# Patient Record
Sex: FEMALE | Race: WHITE | Employment: OTHER | URBAN - METROPOLITAN AREA
[De-identification: names, ages, dates, MRNs, and addresses within clinical notes are randomized per-mention and may not be internally consistent; named-entity substitution may affect disease eponyms.]

---

## 2017-01-05 ENCOUNTER — OFFICE VISIT (OUTPATIENT)
Dept: NEPHROLOGY | Facility: CLINIC | Age: 57
End: 2017-01-05

## 2017-01-05 VITALS — BODY MASS INDEX: 36 KG/M2 | DIASTOLIC BLOOD PRESSURE: 78 MMHG | WEIGHT: 190 LBS | SYSTOLIC BLOOD PRESSURE: 124 MMHG

## 2017-01-05 DIAGNOSIS — N18.3 CKD (CHRONIC KIDNEY DISEASE), STAGE 3 (MODERATE): Primary | ICD-10-CM

## 2017-01-05 DIAGNOSIS — Z94.0 RENAL TRANSPLANT RECIPIENT: ICD-10-CM

## 2017-01-05 DIAGNOSIS — I10 BENIGN HYPERTENSION: ICD-10-CM

## 2017-01-05 PROCEDURE — 99214 OFFICE O/P EST MOD 30 MIN: CPT | Performed by: INTERNAL MEDICINE

## 2017-01-05 RX ORDER — PREDNISONE 1 MG/1
5 TABLET ORAL DAILY
COMMUNITY
End: 2017-01-13

## 2017-01-05 RX ORDER — DOXEPIN HYDROCHLORIDE 50 MG/1
1 CAPSULE ORAL DAILY
COMMUNITY

## 2017-01-05 RX ORDER — AMPICILLIN TRIHYDRATE 250 MG
1000 CAPSULE ORAL 2 TIMES DAILY
COMMUNITY

## 2017-01-05 RX ORDER — MAGNESIUM OXIDE 400 MG (241.3 MG MAGNESIUM) TABLET
400 TABLET 2 TIMES DAILY
COMMUNITY

## 2017-01-05 NOTE — PROGRESS NOTES
Nephrology Progress Note      Madelin Diaz is a 60year old female.     HPI:   Patient presents with:  Chronic Kidney Disease  Hypertension  Renal Transplant    Maryann Galvez was seen in the nephrology clinic today in follow-up for management of chronic kidn after 30 min. Disp:  Rfl:    aspirin 81 MG Oral Tab Take 81 mg by mouth daily. Disp:  Rfl:    Omega 3 1000 MG Oral Cap Take 3,000 mg by mouth 2 (two) times daily. Disp:  Rfl:    Cholecalciferol (VITAMIN D) 1000 UNITS Oral Cap Take  by mouth.  Disp:  Rfl: 12/23/2016   MCHC 30.5* 12/23/2016   RDW 13.2 12/23/2016   NEPRELIM 3.74 12/23/2016   NEPERCENT 50.5 12/23/2016   LYPERCENT 36.1 12/23/2016   MOPERCENT 8.6 12/23/2016   EOPERCENT 3.8 12/23/2016   BAPERCENT 0.9 12/23/2016   NE 3.74 12/23/2016   LYMABS 2.68 losartan.       Perez Fleming MD  1/5/2017  9:10 AM

## 2017-01-13 RX ORDER — CYCLOSPORINE 25 MG/1
CAPSULE, GELATIN COATED ORAL
Qty: 450 CAPSULE | Refills: 3 | Status: SHIPPED | OUTPATIENT
Start: 2017-01-13 | End: 2018-01-25

## 2017-01-13 RX ORDER — ATORVASTATIN CALCIUM 20 MG/1
20 TABLET, FILM COATED ORAL DAILY
Qty: 90 TABLET | Refills: 3 | Status: SHIPPED | OUTPATIENT
Start: 2017-01-13 | End: 2018-01-25

## 2017-01-13 RX ORDER — LOSARTAN POTASSIUM 50 MG/1
50 TABLET ORAL DAILY
Qty: 90 TABLET | Refills: 3 | Status: SHIPPED | OUTPATIENT
Start: 2017-01-13 | End: 2018-01-25

## 2017-01-13 RX ORDER — PREDNISONE 1 MG/1
5 TABLET ORAL DAILY
Qty: 90 TABLET | Refills: 3 | Status: SHIPPED | OUTPATIENT
Start: 2017-01-13 | End: 2018-01-25

## 2017-01-13 RX ORDER — MYCOPHENOLATE MOFETIL 500 MG/1
1000 TABLET ORAL 2 TIMES DAILY
Qty: 360 TABLET | Refills: 3 | Status: SHIPPED | OUTPATIENT
Start: 2017-01-13 | End: 2018-01-25

## 2017-03-31 ENCOUNTER — LAB ENCOUNTER (OUTPATIENT)
Dept: LAB | Age: 57
End: 2017-03-31
Attending: INTERNAL MEDICINE
Payer: COMMERCIAL

## 2017-03-31 DIAGNOSIS — N18.3 CKD (CHRONIC KIDNEY DISEASE), STAGE 3 (MODERATE): ICD-10-CM

## 2017-03-31 DIAGNOSIS — I10 BENIGN HYPERTENSION: ICD-10-CM

## 2017-03-31 DIAGNOSIS — Z94.0 RENAL TRANSPLANT RECIPIENT: ICD-10-CM

## 2017-03-31 PROCEDURE — 80061 LIPID PANEL: CPT

## 2017-03-31 PROCEDURE — 80053 COMPREHEN METABOLIC PANEL: CPT

## 2017-03-31 PROCEDURE — 80158 DRUG ASSAY CYCLOSPORINE: CPT

## 2017-03-31 PROCEDURE — 36415 COLL VENOUS BLD VENIPUNCTURE: CPT

## 2017-03-31 PROCEDURE — 81001 URINALYSIS AUTO W/SCOPE: CPT

## 2017-03-31 PROCEDURE — 83036 HEMOGLOBIN GLYCOSYLATED A1C: CPT

## 2017-03-31 PROCEDURE — 85025 COMPLETE CBC W/AUTO DIFF WBC: CPT

## 2017-03-31 PROCEDURE — 82570 ASSAY OF URINE CREATININE: CPT

## 2017-03-31 PROCEDURE — 84156 ASSAY OF PROTEIN URINE: CPT

## 2017-04-06 PROBLEM — E78.2 MIXED HYPERLIPIDEMIA: Status: ACTIVE | Noted: 2017-04-06

## 2017-04-06 PROBLEM — E11.22 DIABETES MELLITUS WITH STAGE 3 CHRONIC KIDNEY DISEASE (HCC): Status: ACTIVE | Noted: 2017-04-06

## 2017-04-06 PROBLEM — I10 ESSENTIAL HYPERTENSION: Status: ACTIVE | Noted: 2017-04-06

## 2017-04-06 PROBLEM — N18.30 DIABETES MELLITUS WITH STAGE 3 CHRONIC KIDNEY DISEASE (HCC): Status: ACTIVE | Noted: 2017-04-06

## 2017-04-19 ENCOUNTER — TELEPHONE (OUTPATIENT)
Dept: FAMILY MEDICINE CLINIC | Facility: CLINIC | Age: 57
End: 2017-04-19

## 2017-04-19 DIAGNOSIS — Z00.00 ROUTINE HEALTH MAINTENANCE: Primary | ICD-10-CM

## 2017-04-20 ENCOUNTER — TELEPHONE (OUTPATIENT)
Dept: FAMILY MEDICINE CLINIC | Facility: CLINIC | Age: 57
End: 2017-04-20

## 2017-04-20 NOTE — TELEPHONE ENCOUNTER
Previous colonoscopy report and path report printed and faxed to Dr. Osei Degroot.
03-Feb-2017 06:00

## 2017-05-09 ENCOUNTER — HOSPITAL ENCOUNTER (OUTPATIENT)
Dept: BONE DENSITY | Age: 57
Discharge: HOME | End: 2017-05-09
Attending: ADVANCED PRACTICE MIDWIFE
Payer: COMMERCIAL

## 2017-05-09 ENCOUNTER — HOSPITAL ENCOUNTER (OUTPATIENT)
Dept: MAMMOGRAPHY | Age: 57
Discharge: HOME | End: 2017-05-09
Attending: ADVANCED PRACTICE MIDWIFE
Payer: COMMERCIAL

## 2017-05-09 DIAGNOSIS — M85.80 OSTEOPENIA: ICD-10-CM

## 2017-05-09 DIAGNOSIS — Z12.31 SCREENING MAMMOGRAM, ENCOUNTER FOR: ICD-10-CM

## 2017-05-09 PROCEDURE — 77080 DXA BONE DENSITY AXIAL: CPT | Performed by: ADVANCED PRACTICE MIDWIFE

## 2017-05-09 PROCEDURE — 77067 SCR MAMMO BI INCL CAD: CPT | Performed by: ADVANCED PRACTICE MIDWIFE

## 2017-06-30 ENCOUNTER — LAB ENCOUNTER (OUTPATIENT)
Dept: LAB | Age: 57
End: 2017-06-30
Attending: INTERNAL MEDICINE
Payer: COMMERCIAL

## 2017-06-30 DIAGNOSIS — I10 BENIGN HYPERTENSION: ICD-10-CM

## 2017-06-30 DIAGNOSIS — N18.30 STAGE 3 CHRONIC KIDNEY DISEASE (HCC): ICD-10-CM

## 2017-06-30 DIAGNOSIS — Z94.0 RENAL TRANSPLANT RECIPIENT: ICD-10-CM

## 2017-06-30 LAB
ALBUMIN SERPL-MCNC: 3.4 G/DL (ref 3.5–4.8)
ALP LIVER SERPL-CCNC: 86 U/L (ref 46–118)
ALT SERPL-CCNC: 16 U/L (ref 14–54)
AST SERPL-CCNC: 13 U/L (ref 15–41)
BASOPHILS # BLD AUTO: 0.07 X10(3) UL (ref 0–0.1)
BASOPHILS NFR BLD AUTO: 1.2 %
BILIRUB SERPL-MCNC: 0.6 MG/DL (ref 0.1–2)
BILIRUB UR QL STRIP.AUTO: NEGATIVE
BUN BLD-MCNC: 36 MG/DL (ref 8–20)
CALCIUM BLD-MCNC: 9.4 MG/DL (ref 8.3–10.3)
CHLORIDE: 109 MMOL/L (ref 101–111)
CHOLEST SMN-MCNC: 150 MG/DL (ref ?–200)
CO2: 27 MMOL/L (ref 22–32)
COLOR UR AUTO: YELLOW
CREAT BLD-MCNC: 1.95 MG/DL (ref 0.55–1.02)
CREAT UR-SCNC: 242 MG/DL
EOSINOPHIL # BLD AUTO: 0.2 X10(3) UL (ref 0–0.3)
EOSINOPHIL NFR BLD AUTO: 3.4 %
ERYTHROCYTE [DISTWIDTH] IN BLOOD BY AUTOMATED COUNT: 13.5 % (ref 11.5–16)
EST. AVERAGE GLUCOSE BLD GHB EST-MCNC: 131 MG/DL (ref 68–126)
GLUCOSE BLD-MCNC: 79 MG/DL (ref 70–99)
GLUCOSE UR STRIP.AUTO-MCNC: NEGATIVE MG/DL
HBA1C MFR BLD HPLC: 6.2 % (ref ?–5.7)
HCT VFR BLD AUTO: 35.4 % (ref 34–50)
HDLC SERPL-MCNC: 57 MG/DL (ref 45–?)
HDLC SERPL: 2.63 {RATIO} (ref ?–4.44)
HGB BLD-MCNC: 10.9 G/DL (ref 12–16)
HYALINE CASTS #/AREA URNS AUTO: PRESENT /LPF
IMMATURE GRANULOCYTE COUNT: 0.02 X10(3) UL (ref 0–1)
IMMATURE GRANULOCYTE RATIO %: 0.3 %
KETONES UR STRIP.AUTO-MCNC: NEGATIVE MG/DL
LDLC SERPL CALC-MCNC: 68 MG/DL (ref ?–130)
LYMPHOCYTES # BLD AUTO: 2.33 X10(3) UL (ref 0.9–4)
LYMPHOCYTES NFR BLD AUTO: 39.5 %
M PROTEIN MFR SERPL ELPH: 6.9 G/DL (ref 6.1–8.3)
MCH RBC QN AUTO: 29.1 PG (ref 27–33.2)
MCHC RBC AUTO-ENTMCNC: 30.8 G/DL (ref 31–37)
MCV RBC AUTO: 94.7 FL (ref 81–100)
MONOCYTES # BLD AUTO: 0.6 X10(3) UL (ref 0.1–0.6)
MONOCYTES NFR BLD AUTO: 10.2 %
NEUTROPHIL ABS PRELIM: 2.68 X10 (3) UL (ref 1.3–6.7)
NEUTROPHILS # BLD AUTO: 2.68 X10(3) UL (ref 1.3–6.7)
NEUTROPHILS NFR BLD AUTO: 45.4 %
NITRITE UR QL STRIP.AUTO: NEGATIVE
NONHDLC SERPL-MCNC: 93 MG/DL (ref ?–130)
PH UR STRIP.AUTO: 5 [PH] (ref 4.5–8)
PLATELET # BLD AUTO: 216 10(3)UL (ref 150–450)
POTASSIUM SERPL-SCNC: 4.5 MMOL/L (ref 3.6–5.1)
PROT UR STRIP.AUTO-MCNC: NEGATIVE MG/DL
PROT UR-MCNC: 14 MG/DL
RBC # BLD AUTO: 3.74 X10(6)UL (ref 3.8–5.1)
RBC UR QL AUTO: NEGATIVE
RED CELL DISTRIBUTION WIDTH-SD: 45.9 FL (ref 35.1–46.3)
SODIUM SERPL-SCNC: 144 MMOL/L (ref 136–144)
SP GR UR STRIP.AUTO: 1.02 (ref 1–1.03)
TRIGLYCERIDES: 124 MG/DL (ref ?–150)
UROBILINOGEN UR STRIP.AUTO-MCNC: <2 MG/DL
VLDL: 25 MG/DL (ref 5–40)
WBC # BLD AUTO: 5.9 X10(3) UL (ref 4–13)

## 2017-06-30 PROCEDURE — 80158 DRUG ASSAY CYCLOSPORINE: CPT | Performed by: INTERNAL MEDICINE

## 2017-06-30 PROCEDURE — 85025 COMPLETE CBC W/AUTO DIFF WBC: CPT | Performed by: INTERNAL MEDICINE

## 2017-06-30 PROCEDURE — 82570 ASSAY OF URINE CREATININE: CPT | Performed by: INTERNAL MEDICINE

## 2017-06-30 PROCEDURE — 80061 LIPID PANEL: CPT | Performed by: INTERNAL MEDICINE

## 2017-06-30 PROCEDURE — 84156 ASSAY OF PROTEIN URINE: CPT | Performed by: INTERNAL MEDICINE

## 2017-06-30 PROCEDURE — 80053 COMPREHEN METABOLIC PANEL: CPT | Performed by: INTERNAL MEDICINE

## 2017-06-30 PROCEDURE — 81001 URINALYSIS AUTO W/SCOPE: CPT | Performed by: INTERNAL MEDICINE

## 2017-06-30 PROCEDURE — 83036 HEMOGLOBIN GLYCOSYLATED A1C: CPT | Performed by: INTERNAL MEDICINE

## 2017-06-30 PROCEDURE — 36415 COLL VENOUS BLD VENIPUNCTURE: CPT | Performed by: INTERNAL MEDICINE

## 2017-07-01 LAB — CYCLOSPORINE A: 73.5 NG/ML

## 2017-09-29 ENCOUNTER — LAB ENCOUNTER (OUTPATIENT)
Dept: LAB | Age: 57
End: 2017-09-29
Attending: INTERNAL MEDICINE
Payer: COMMERCIAL

## 2017-09-29 DIAGNOSIS — I10 BENIGN HYPERTENSION: ICD-10-CM

## 2017-09-29 DIAGNOSIS — N18.30 STAGE 3 CHRONIC KIDNEY DISEASE (HCC): ICD-10-CM

## 2017-09-29 DIAGNOSIS — Z94.0 RENAL TRANSPLANT RECIPIENT: ICD-10-CM

## 2017-09-29 PROCEDURE — 83036 HEMOGLOBIN GLYCOSYLATED A1C: CPT | Performed by: INTERNAL MEDICINE

## 2017-09-29 PROCEDURE — 82570 ASSAY OF URINE CREATININE: CPT | Performed by: INTERNAL MEDICINE

## 2017-09-29 PROCEDURE — 85025 COMPLETE CBC W/AUTO DIFF WBC: CPT | Performed by: INTERNAL MEDICINE

## 2017-09-29 PROCEDURE — 84156 ASSAY OF PROTEIN URINE: CPT | Performed by: INTERNAL MEDICINE

## 2017-09-29 PROCEDURE — 80158 DRUG ASSAY CYCLOSPORINE: CPT | Performed by: INTERNAL MEDICINE

## 2017-09-29 PROCEDURE — 81001 URINALYSIS AUTO W/SCOPE: CPT | Performed by: INTERNAL MEDICINE

## 2017-09-29 PROCEDURE — 80053 COMPREHEN METABOLIC PANEL: CPT | Performed by: INTERNAL MEDICINE

## 2017-09-29 PROCEDURE — 36415 COLL VENOUS BLD VENIPUNCTURE: CPT | Performed by: INTERNAL MEDICINE

## 2017-09-29 PROCEDURE — 80061 LIPID PANEL: CPT | Performed by: INTERNAL MEDICINE

## 2017-10-26 PROBLEM — N18.30 DIABETES MELLITUS WITH STAGE 3 CHRONIC KIDNEY DISEASE (HCC): Status: RESOLVED | Noted: 2017-04-06 | Resolved: 2017-10-26

## 2017-10-26 PROBLEM — E11.22 DIABETES MELLITUS WITH STAGE 3 CHRONIC KIDNEY DISEASE (HCC): Status: RESOLVED | Noted: 2017-04-06 | Resolved: 2017-10-26

## 2017-11-27 DIAGNOSIS — Z94.0 S/P KIDNEY TRANSPLANT: Primary | ICD-10-CM

## 2017-12-29 ENCOUNTER — APPOINTMENT (OUTPATIENT)
Dept: LAB | Age: 57
End: 2017-12-29
Attending: INTERNAL MEDICINE
Payer: COMMERCIAL

## 2017-12-29 DIAGNOSIS — N18.30 STAGE 3 CHRONIC KIDNEY DISEASE (HCC): ICD-10-CM

## 2017-12-29 DIAGNOSIS — I10 BENIGN HYPERTENSION: ICD-10-CM

## 2017-12-29 DIAGNOSIS — Z94.0 RENAL TRANSPLANT RECIPIENT: ICD-10-CM

## 2017-12-29 LAB
CHOLEST SMN-MCNC: 163 MG/DL (ref ?–200)
EST. AVERAGE GLUCOSE BLD GHB EST-MCNC: 137 MG/DL (ref 68–126)
HBA1C MFR BLD HPLC: 6.4 % (ref ?–5.7)
HDLC SERPL-MCNC: 61 MG/DL (ref 45–?)
HDLC SERPL: 2.67 {RATIO} (ref ?–4.44)
LDLC SERPL CALC-MCNC: 77 MG/DL (ref ?–130)
NONHDLC SERPL-MCNC: 102 MG/DL (ref ?–130)
TRIGL SERPL-MCNC: 126 MG/DL (ref ?–150)
VLDLC SERPL CALC-MCNC: 25 MG/DL (ref 5–40)

## 2017-12-29 PROCEDURE — 83036 HEMOGLOBIN GLYCOSYLATED A1C: CPT | Performed by: INTERNAL MEDICINE

## 2017-12-29 PROCEDURE — 80061 LIPID PANEL: CPT | Performed by: INTERNAL MEDICINE

## 2018-01-09 ENCOUNTER — OFFICE VISIT (OUTPATIENT)
Dept: NEPHROLOGY | Facility: CLINIC | Age: 58
End: 2018-01-09

## 2018-01-09 VITALS — WEIGHT: 195 LBS | BODY MASS INDEX: 37 KG/M2 | SYSTOLIC BLOOD PRESSURE: 114 MMHG | DIASTOLIC BLOOD PRESSURE: 72 MMHG

## 2018-01-09 DIAGNOSIS — N18.30 CKD (CHRONIC KIDNEY DISEASE) STAGE 3, GFR 30-59 ML/MIN (HCC): Primary | ICD-10-CM

## 2018-01-09 DIAGNOSIS — Z94.0 RENAL TRANSPLANT RECIPIENT: ICD-10-CM

## 2018-01-09 DIAGNOSIS — I10 BENIGN HYPERTENSION: ICD-10-CM

## 2018-01-09 PROCEDURE — 99214 OFFICE O/P EST MOD 30 MIN: CPT | Performed by: INTERNAL MEDICINE

## 2018-01-09 NOTE — PROGRESS NOTES
Nephrology Progress Note      Lee Anderson is a 59year old female.     HPI:   Patient presents with:  Chronic Kidney Disease  Hypertension  Renal Transplant      Francesca oMrley was seen in the nephrology clinic today in follow-up for management of chronic ki times daily. Disp:  Rfl:    multivitamin Oral Tab Take 1 tablet by mouth daily. Disp:  Rfl:    Cinnamon 500 MG Oral Cap Take 1,000 mg by mouth 2 (two) times daily. Disp:  Rfl:    Misc Natural Products (OSTEO BI-FLEX ADV DOUBLE ST OR) Take by mouth daily.  D 12/29/2017   ALT 19 12/29/2017   BILT 0.6 12/29/2017   TP 7.2 12/29/2017   ALB 3.5 12/29/2017    12/29/2017   K 4.4 12/29/2017    12/29/2017   CO2 26.0 12/29/2017       Lab Results  Component Value Date   RBC 4.05 12/29/2017   HGB 12.0 12/29/20 Again, she has done very well from a renal function standpoint and remains stable on Sandimmune 75 mg in the morning/50 mg in the evening and CellCept 1000 mg twice daily as well as prednisone 5 mg daily.   We will make no changes in her immunosuppression r

## 2018-01-25 RX ORDER — ATORVASTATIN CALCIUM 20 MG/1
20 TABLET, FILM COATED ORAL DAILY
Qty: 90 TABLET | Refills: 1 | Status: SHIPPED | OUTPATIENT
Start: 2018-01-25 | End: 2018-06-06

## 2018-01-25 RX ORDER — CYCLOSPORINE 25 MG/1
CAPSULE, GELATIN COATED ORAL
Qty: 450 CAPSULE | Refills: 1 | Status: SHIPPED | OUTPATIENT
Start: 2018-01-25 | End: 2018-06-06

## 2018-01-25 RX ORDER — LOSARTAN POTASSIUM 50 MG/1
50 TABLET ORAL DAILY
Qty: 90 TABLET | Refills: 1 | Status: SHIPPED | OUTPATIENT
Start: 2018-01-25 | End: 2018-06-06

## 2018-01-25 RX ORDER — MYCOPHENOLATE MOFETIL 500 MG/1
1000 TABLET ORAL 2 TIMES DAILY
Qty: 360 TABLET | Refills: 1 | Status: SHIPPED | OUTPATIENT
Start: 2018-01-25 | End: 2018-06-06

## 2018-01-25 RX ORDER — PREDNISONE 1 MG/1
5 TABLET ORAL DAILY
Qty: 90 TABLET | Refills: 1 | Status: SHIPPED | OUTPATIENT
Start: 2018-01-25 | End: 2018-06-06

## 2018-02-21 ENCOUNTER — PATIENT OUTREACH (OUTPATIENT)
Dept: FAMILY MEDICINE CLINIC | Facility: CLINIC | Age: 58
End: 2018-02-21

## 2018-04-09 DIAGNOSIS — Z94.0 STATUS POST KIDNEY TRANSPLANT: Primary | ICD-10-CM

## 2018-04-10 ENCOUNTER — TELEPHONE (OUTPATIENT)
Dept: FAMILY MEDICINE CLINIC | Facility: CLINIC | Age: 58
End: 2018-04-10

## 2018-04-10 PROBLEM — M17.0 PRIMARY OSTEOARTHRITIS OF BOTH KNEES: Status: ACTIVE | Noted: 2018-04-10

## 2018-04-10 NOTE — TELEPHONE ENCOUNTER
Patient states that she has already had her diabetic eye exam. Had it done at Dr Fran Nascimento, ph # [phone]. Please call there to get a copy.

## 2018-04-13 ENCOUNTER — LAB ENCOUNTER (OUTPATIENT)
Dept: LAB | Age: 58
End: 2018-04-13
Attending: INTERNAL MEDICINE
Payer: COMMERCIAL

## 2018-04-13 DIAGNOSIS — Z94.0 STATUS POST KIDNEY TRANSPLANT: ICD-10-CM

## 2018-04-13 PROCEDURE — 36415 COLL VENOUS BLD VENIPUNCTURE: CPT | Performed by: INTERNAL MEDICINE

## 2018-04-13 PROCEDURE — 81001 URINALYSIS AUTO W/SCOPE: CPT | Performed by: INTERNAL MEDICINE

## 2018-04-13 PROCEDURE — 82570 ASSAY OF URINE CREATININE: CPT | Performed by: INTERNAL MEDICINE

## 2018-04-13 PROCEDURE — 80053 COMPREHEN METABOLIC PANEL: CPT | Performed by: INTERNAL MEDICINE

## 2018-04-13 PROCEDURE — 85025 COMPLETE CBC W/AUTO DIFF WBC: CPT | Performed by: INTERNAL MEDICINE

## 2018-04-13 PROCEDURE — 84156 ASSAY OF PROTEIN URINE: CPT | Performed by: INTERNAL MEDICINE

## 2018-04-13 PROCEDURE — 80158 DRUG ASSAY CYCLOSPORINE: CPT | Performed by: INTERNAL MEDICINE

## 2018-04-16 DIAGNOSIS — Z94.0 STATUS POST KIDNEY TRANSPLANT: Primary | ICD-10-CM

## 2018-04-27 ENCOUNTER — TELEPHONE (OUTPATIENT)
Dept: FAMILY MEDICINE CLINIC | Facility: CLINIC | Age: 58
End: 2018-04-27

## 2018-04-27 NOTE — TELEPHONE ENCOUNTER
Fax received of labs from [facility]. External results put in [facility]. Fax sent for scanning. Left message on home phone number to call back.  Per Dr. Desmond Rowell, labs from above office look great and pt needs diabetic eye exam. There is a m

## 2018-05-21 ENCOUNTER — HOSPITAL ENCOUNTER (OUTPATIENT)
Dept: MAMMOGRAPHY | Facility: HOSPITAL | Age: 58
Discharge: HOME | End: 2018-05-21
Attending: ADVANCED PRACTICE MIDWIFE
Payer: COMMERCIAL

## 2018-05-21 DIAGNOSIS — Z12.39 ENCOUNTER FOR SCREENING FOR MALIGNANT NEOPLASM OF BREAST: ICD-10-CM

## 2018-05-21 PROCEDURE — 77063 BREAST TOMOSYNTHESIS BI: CPT | Performed by: ADVANCED PRACTICE MIDWIFE

## 2018-05-21 PROCEDURE — 77067 SCR MAMMO BI INCL CAD: CPT | Performed by: ADVANCED PRACTICE MIDWIFE

## 2018-06-06 RX ORDER — CYCLOSPORINE 25 MG/1
CAPSULE, GELATIN COATED ORAL
Qty: 450 CAPSULE | Refills: 1 | Status: CANCELLED
Start: 2018-06-06

## 2018-06-06 RX ORDER — ATORVASTATIN CALCIUM 20 MG/1
20 TABLET, FILM COATED ORAL DAILY
Qty: 90 TABLET | Refills: 1 | Status: CANCELLED
Start: 2018-06-06

## 2018-06-06 RX ORDER — ATORVASTATIN CALCIUM 20 MG/1
20 TABLET, FILM COATED ORAL DAILY
Qty: 90 TABLET | Refills: 1 | Status: SHIPPED | OUTPATIENT
Start: 2018-06-06 | End: 2020-04-06

## 2018-06-06 RX ORDER — PREDNISONE 5 MG/1
5 TABLET ORAL DAILY
Qty: 90 TABLET | Refills: 1 | Status: CANCELLED
Start: 2018-06-06

## 2018-06-06 RX ORDER — PREDNISONE 1 MG/1
5 TABLET ORAL DAILY
Qty: 90 TABLET | Refills: 1 | Status: SHIPPED | OUTPATIENT
Start: 2018-06-06 | End: 2020-04-06

## 2018-06-06 RX ORDER — LOSARTAN POTASSIUM 50 MG/1
50 TABLET ORAL DAILY
Qty: 90 TABLET | Refills: 1 | Status: CANCELLED
Start: 2018-06-06

## 2018-06-06 RX ORDER — MYCOPHENOLATE MOFETIL 500 MG/1
1000 TABLET ORAL 2 TIMES DAILY
Qty: 360 TABLET | Refills: 1 | Status: SHIPPED | OUTPATIENT
Start: 2018-06-06 | End: 2021-04-09

## 2018-06-06 RX ORDER — CYCLOSPORINE 25 MG/1
CAPSULE, GELATIN COATED ORAL
Qty: 450 CAPSULE | Refills: 1 | Status: SHIPPED | OUTPATIENT
Start: 2018-06-06 | End: 2019-09-09

## 2018-06-06 RX ORDER — MYCOPHENOLATE MOFETIL 500 MG/1
1000 TABLET ORAL 2 TIMES DAILY
Qty: 360 TABLET | Refills: 1 | Status: CANCELLED
Start: 2018-06-06

## 2018-06-06 RX ORDER — LOSARTAN POTASSIUM 50 MG/1
50 TABLET ORAL DAILY
Qty: 90 TABLET | Refills: 1 | Status: SHIPPED | OUTPATIENT
Start: 2018-06-06 | End: 2020-04-06

## 2018-06-06 NOTE — TELEPHONE ENCOUNTER
From: Peyton Nova  Sent: 6/6/2018 9:19 AM CDT  Subject: Medication Renewal Request    Peyton Nova would like a refill of the following medications:     predniSONE 5 MG Oral Tab Nathalie Espinoza MD]     Losartan Potassium 50 MG Oral Tab Fred Can MD]     Mycophenolate Mofetil 500 MG Oral Tab Fred Can MD]     cycloSPORINE non-modified 25 MG Oral Cap Fred Can MD]     atorvastatin (LIPITOR) 20 MG Oral Tab Fred Can MD]    Preferred pharmacy: [facility] [address] [phone], [phone]

## 2018-07-06 ENCOUNTER — LAB ENCOUNTER (OUTPATIENT)
Dept: LAB | Age: 58
End: 2018-07-06
Attending: INTERNAL MEDICINE
Payer: COMMERCIAL

## 2018-07-06 DIAGNOSIS — Z94.0 STATUS POST KIDNEY TRANSPLANT: ICD-10-CM

## 2018-07-06 LAB
ALBUMIN SERPL-MCNC: 3.5 G/DL (ref 3.5–4.8)
ALP LIVER SERPL-CCNC: 69 U/L (ref 46–118)
ALT SERPL-CCNC: 18 U/L (ref 14–54)
AST SERPL-CCNC: 19 U/L (ref 15–41)
BASOPHILS # BLD AUTO: 0.08 X10(3) UL (ref 0–0.1)
BASOPHILS NFR BLD AUTO: 1 %
BILIRUB SERPL-MCNC: 0.6 MG/DL (ref 0.1–2)
BILIRUB UR QL STRIP.AUTO: NEGATIVE
BUN BLD-MCNC: 23 MG/DL (ref 8–20)
CALCIUM BLD-MCNC: 9.7 MG/DL (ref 8.3–10.3)
CHLORIDE: 109 MMOL/L (ref 101–111)
CO2: 25 MMOL/L (ref 22–32)
COLOR UR AUTO: YELLOW
CREAT BLD-MCNC: 1.53 MG/DL (ref 0.55–1.02)
CREAT UR-SCNC: 250 MG/DL
EOSINOPHIL # BLD AUTO: 0.13 X10(3) UL (ref 0–0.3)
EOSINOPHIL NFR BLD AUTO: 1.7 %
ERYTHROCYTE [DISTWIDTH] IN BLOOD BY AUTOMATED COUNT: 12.7 % (ref 11.5–16)
GLUCOSE BLD-MCNC: 80 MG/DL (ref 70–99)
GLUCOSE UR STRIP.AUTO-MCNC: NEGATIVE MG/DL
HCT VFR BLD AUTO: 36.4 % (ref 34–50)
HGB BLD-MCNC: 11.4 G/DL (ref 12–16)
HYALINE CASTS #/AREA URNS AUTO: PRESENT /LPF
IMMATURE GRANULOCYTE COUNT: 0.01 X10(3) UL (ref 0–1)
IMMATURE GRANULOCYTE RATIO %: 0.1 %
KETONES UR STRIP.AUTO-MCNC: NEGATIVE MG/DL
LYMPHOCYTES # BLD AUTO: 3.2 X10(3) UL (ref 0.9–4)
LYMPHOCYTES NFR BLD AUTO: 41.8 %
M PROTEIN MFR SERPL ELPH: 6.9 G/DL (ref 6.1–8.3)
MCH RBC QN AUTO: 29.7 PG (ref 27–33.2)
MCHC RBC AUTO-ENTMCNC: 31.3 G/DL (ref 31–37)
MCV RBC AUTO: 94.8 FL (ref 81–100)
MONOCYTES # BLD AUTO: 0.62 X10(3) UL (ref 0.1–1)
MONOCYTES NFR BLD AUTO: 8.1 %
NEUTROPHIL ABS PRELIM: 3.61 X10 (3) UL (ref 1.3–6.7)
NEUTROPHILS # BLD AUTO: 3.61 X10(3) UL (ref 1.3–6.7)
NEUTROPHILS NFR BLD AUTO: 47.3 %
NITRITE UR QL STRIP.AUTO: NEGATIVE
PH UR STRIP.AUTO: 5 [PH] (ref 4.5–8)
PLATELET # BLD AUTO: 242 10(3)UL (ref 150–450)
POTASSIUM SERPL-SCNC: 4.4 MMOL/L (ref 3.6–5.1)
PROT UR STRIP.AUTO-MCNC: NEGATIVE MG/DL
PROT UR-MCNC: 17.1 MG/DL
RBC # BLD AUTO: 3.84 X10(6)UL (ref 3.8–5.1)
RED CELL DISTRIBUTION WIDTH-SD: 43.5 FL (ref 35.1–46.3)
SODIUM SERPL-SCNC: 143 MMOL/L (ref 136–144)
SP GR UR STRIP.AUTO: 1.02 (ref 1–1.03)
UROBILINOGEN UR STRIP.AUTO-MCNC: <2 MG/DL
WBC # BLD AUTO: 7.7 X10(3) UL (ref 4–13)

## 2018-07-06 PROCEDURE — 84156 ASSAY OF PROTEIN URINE: CPT | Performed by: INTERNAL MEDICINE

## 2018-07-06 PROCEDURE — 82570 ASSAY OF URINE CREATININE: CPT | Performed by: INTERNAL MEDICINE

## 2018-07-06 PROCEDURE — 80053 COMPREHEN METABOLIC PANEL: CPT | Performed by: INTERNAL MEDICINE

## 2018-07-06 PROCEDURE — 85025 COMPLETE CBC W/AUTO DIFF WBC: CPT | Performed by: INTERNAL MEDICINE

## 2018-07-06 PROCEDURE — 81001 URINALYSIS AUTO W/SCOPE: CPT | Performed by: INTERNAL MEDICINE

## 2018-07-06 PROCEDURE — 36415 COLL VENOUS BLD VENIPUNCTURE: CPT | Performed by: INTERNAL MEDICINE

## 2018-07-06 PROCEDURE — 80158 DRUG ASSAY CYCLOSPORINE: CPT | Performed by: INTERNAL MEDICINE

## 2018-07-09 LAB — CYCLOSPORINE A: 95.2 NG/ML

## 2018-09-28 ENCOUNTER — LAB ENCOUNTER (OUTPATIENT)
Dept: LAB | Age: 58
End: 2018-09-28
Attending: INTERNAL MEDICINE
Payer: COMMERCIAL

## 2018-09-28 DIAGNOSIS — Z94.0 STATUS POST KIDNEY TRANSPLANT: ICD-10-CM

## 2018-09-28 LAB
ALBUMIN SERPL-MCNC: 3.5 G/DL (ref 3.5–4.8)
ALBUMIN/GLOB SERPL: 1 {RATIO} (ref 1–2)
ALP LIVER SERPL-CCNC: 80 U/L (ref 46–118)
ALT SERPL-CCNC: 17 U/L (ref 14–54)
ANION GAP SERPL CALC-SCNC: 7 MMOL/L (ref 0–18)
AST SERPL-CCNC: 15 U/L (ref 15–41)
BASOPHILS # BLD AUTO: 0.06 X10(3) UL (ref 0–0.1)
BASOPHILS NFR BLD AUTO: 0.9 %
BILIRUB SERPL-MCNC: 0.7 MG/DL (ref 0.1–2)
BILIRUB UR QL STRIP.AUTO: NEGATIVE
BUN BLD-MCNC: 25 MG/DL (ref 8–20)
BUN/CREAT SERPL: 16.6 (ref 10–20)
CALCIUM BLD-MCNC: 9.7 MG/DL (ref 8.3–10.3)
CHLORIDE SERPL-SCNC: 110 MMOL/L (ref 101–111)
CLARITY UR REFRACT.AUTO: CLEAR
CO2 SERPL-SCNC: 26 MMOL/L (ref 22–32)
COLOR UR AUTO: YELLOW
CREAT BLD-MCNC: 1.51 MG/DL (ref 0.55–1.02)
CREAT UR-SCNC: 62 MG/DL
EOSINOPHIL # BLD AUTO: 0.15 X10(3) UL (ref 0–0.3)
EOSINOPHIL NFR BLD AUTO: 2.2 %
ERYTHROCYTE [DISTWIDTH] IN BLOOD BY AUTOMATED COUNT: 12.8 % (ref 11.5–16)
GLOBULIN PLAS-MCNC: 3.5 G/DL (ref 2.5–4)
GLUCOSE BLD-MCNC: 85 MG/DL (ref 70–99)
GLUCOSE UR STRIP.AUTO-MCNC: NEGATIVE MG/DL
HCT VFR BLD AUTO: 37.8 % (ref 34–50)
HGB BLD-MCNC: 11.4 G/DL (ref 12–16)
IMMATURE GRANULOCYTE COUNT: 0.01 X10(3) UL (ref 0–1)
IMMATURE GRANULOCYTE RATIO %: 0.1 %
KETONES UR STRIP.AUTO-MCNC: NEGATIVE MG/DL
LYMPHOCYTES # BLD AUTO: 2.66 X10(3) UL (ref 0.9–4)
LYMPHOCYTES NFR BLD AUTO: 38.6 %
M PROTEIN MFR SERPL ELPH: 7 G/DL (ref 6.1–8.3)
MCH RBC QN AUTO: 29.2 PG (ref 27–33.2)
MCHC RBC AUTO-ENTMCNC: 30.2 G/DL (ref 31–37)
MCV RBC AUTO: 96.7 FL (ref 81–100)
MONOCYTES # BLD AUTO: 0.57 X10(3) UL (ref 0.1–1)
MONOCYTES NFR BLD AUTO: 8.3 %
NEUTROPHIL ABS PRELIM: 3.45 X10 (3) UL (ref 1.3–6.7)
NEUTROPHILS # BLD AUTO: 3.45 X10(3) UL (ref 1.3–6.7)
NEUTROPHILS NFR BLD AUTO: 49.9 %
NITRITE UR QL STRIP.AUTO: NEGATIVE
OSMOLALITY SERPL CALC.SUM OF ELEC: 300 MOSM/KG (ref 275–295)
PH UR STRIP.AUTO: 7 [PH] (ref 4.5–8)
PLATELET # BLD AUTO: 268 10(3)UL (ref 150–450)
POTASSIUM SERPL-SCNC: 4.6 MMOL/L (ref 3.6–5.1)
PROT UR STRIP.AUTO-MCNC: NEGATIVE MG/DL
PROT UR-MCNC: 5.8 MG/DL
RBC # BLD AUTO: 3.91 X10(6)UL (ref 3.8–5.1)
RBC UR QL AUTO: NEGATIVE
RED CELL DISTRIBUTION WIDTH-SD: 44.6 FL (ref 35.1–46.3)
SODIUM SERPL-SCNC: 143 MMOL/L (ref 136–144)
SP GR UR STRIP.AUTO: 1.01 (ref 1–1.03)
UROBILINOGEN UR STRIP.AUTO-MCNC: <2 MG/DL
WBC # BLD AUTO: 6.9 X10(3) UL (ref 4–13)

## 2018-09-28 PROCEDURE — 82570 ASSAY OF URINE CREATININE: CPT | Performed by: INTERNAL MEDICINE

## 2018-09-28 PROCEDURE — 80053 COMPREHEN METABOLIC PANEL: CPT | Performed by: INTERNAL MEDICINE

## 2018-09-28 PROCEDURE — 36415 COLL VENOUS BLD VENIPUNCTURE: CPT | Performed by: INTERNAL MEDICINE

## 2018-09-28 PROCEDURE — 80158 DRUG ASSAY CYCLOSPORINE: CPT | Performed by: INTERNAL MEDICINE

## 2018-09-28 PROCEDURE — 85025 COMPLETE CBC W/AUTO DIFF WBC: CPT | Performed by: INTERNAL MEDICINE

## 2018-09-28 PROCEDURE — 81001 URINALYSIS AUTO W/SCOPE: CPT | Performed by: INTERNAL MEDICINE

## 2018-09-28 PROCEDURE — 84156 ASSAY OF PROTEIN URINE: CPT | Performed by: INTERNAL MEDICINE

## 2018-09-30 LAB — CYCLOSPORINE A: 88.4 NG/ML

## 2018-11-20 DIAGNOSIS — Z94.0 STATUS POST KIDNEY TRANSPLANT: Primary | ICD-10-CM

## 2018-12-28 ENCOUNTER — LAB ENCOUNTER (OUTPATIENT)
Dept: LAB | Age: 58
End: 2018-12-28
Attending: INTERNAL MEDICINE
Payer: COMMERCIAL

## 2018-12-28 DIAGNOSIS — Z94.0 STATUS POST KIDNEY TRANSPLANT: ICD-10-CM

## 2018-12-28 PROCEDURE — 84156 ASSAY OF PROTEIN URINE: CPT | Performed by: INTERNAL MEDICINE

## 2018-12-28 PROCEDURE — 85025 COMPLETE CBC W/AUTO DIFF WBC: CPT | Performed by: INTERNAL MEDICINE

## 2018-12-28 PROCEDURE — 81001 URINALYSIS AUTO W/SCOPE: CPT | Performed by: INTERNAL MEDICINE

## 2018-12-28 PROCEDURE — 82570 ASSAY OF URINE CREATININE: CPT | Performed by: INTERNAL MEDICINE

## 2018-12-28 PROCEDURE — 80053 COMPREHEN METABOLIC PANEL: CPT | Performed by: INTERNAL MEDICINE

## 2018-12-28 PROCEDURE — 36415 COLL VENOUS BLD VENIPUNCTURE: CPT | Performed by: INTERNAL MEDICINE

## 2018-12-28 PROCEDURE — 80158 DRUG ASSAY CYCLOSPORINE: CPT | Performed by: INTERNAL MEDICINE

## 2019-01-24 ENCOUNTER — OFFICE VISIT (OUTPATIENT)
Dept: NEPHROLOGY | Facility: CLINIC | Age: 59
End: 2019-01-24
Payer: COMMERCIAL

## 2019-01-24 VITALS — DIASTOLIC BLOOD PRESSURE: 90 MMHG | BODY MASS INDEX: 35 KG/M2 | SYSTOLIC BLOOD PRESSURE: 136 MMHG | WEIGHT: 187 LBS

## 2019-01-24 DIAGNOSIS — Z94.0 RENAL TRANSPLANT RECIPIENT: ICD-10-CM

## 2019-01-24 DIAGNOSIS — N18.30 CKD (CHRONIC KIDNEY DISEASE) STAGE 3, GFR 30-59 ML/MIN (HCC): Primary | ICD-10-CM

## 2019-01-24 PROCEDURE — 99214 OFFICE O/P EST MOD 30 MIN: CPT | Performed by: INTERNAL MEDICINE

## 2019-01-24 RX ORDER — CYCLOSPORINE 25 MG/1
CAPSULE, GELATIN COATED ORAL
Qty: 450 CAPSULE | Refills: 3 | Status: SHIPPED | OUTPATIENT
Start: 2019-01-24 | End: 2019-02-04

## 2019-01-24 RX ORDER — ATORVASTATIN CALCIUM 20 MG/1
20 TABLET, FILM COATED ORAL NIGHTLY
Qty: 90 TABLET | Refills: 0 | Status: SHIPPED | OUTPATIENT
Start: 2019-01-24 | End: 2019-02-04

## 2019-01-24 RX ORDER — PREDNISONE 1 MG/1
5 TABLET ORAL DAILY
Qty: 90 TABLET | Refills: 3 | Status: SHIPPED | OUTPATIENT
Start: 2019-01-24 | End: 2019-02-04

## 2019-01-24 RX ORDER — MYCOPHENOLATE MOFETIL 500 MG/1
1000 TABLET ORAL 2 TIMES DAILY
Qty: 360 TABLET | Refills: 3 | Status: SHIPPED | OUTPATIENT
Start: 2019-01-24 | End: 2019-02-04

## 2019-01-24 RX ORDER — LOSARTAN POTASSIUM 50 MG/1
50 TABLET ORAL DAILY
Qty: 90 TABLET | Refills: 3 | Status: SHIPPED | OUTPATIENT
Start: 2019-01-24 | End: 2019-02-04

## 2019-01-24 NOTE — PROGRESS NOTES
Nephrology Progress Note      Kiki Hall is a 59year old female.     HPI:   Patient presents with:  Chronic Kidney Disease  Renal Transplant  Hypertension      Glenn Taylor was seen in the nephrology clinic today in follow-up for management of chronic ki Take 1 tablet (5 mg total) by mouth daily. Disp: 90 tablet Rfl: 1   magnesium oxide 400 MG Oral Tab Take 400 mg by mouth 2 (two) times daily. Disp:  Rfl:    multivitamin Oral Tab Take 1 tablet by mouth daily.  Disp:  Rfl:    Cinnamon 500 MG Oral Cap Take 1, 12/28/2018    CREATSERUM 1.39 (H) 12/28/2018    ANIONGAP 7 12/28/2018    GFR 37 (L) 12/29/2017    GFRNAA 41 (L) 12/28/2018    GFRAA 48 (L) 12/28/2018    CA 9.4 12/28/2018    OSMOCALC 300 (H) 12/28/2018    ALKPHO 87 12/28/2018    AST 16 12/28/2018    ALT 17 use of medications which block the renin-angiotensin system. She is on losartan and the blood pressure is excellent. #2.   Hypertension-as mentioned above although her blood pressure is mildly elevated in the office today is excellent at home and I woul

## 2019-01-28 DIAGNOSIS — Z94.0 RENAL TRANSPLANT RECIPIENT: ICD-10-CM

## 2019-01-28 DIAGNOSIS — N18.30 CKD (CHRONIC KIDNEY DISEASE) STAGE 3, GFR 30-59 ML/MIN (HCC): ICD-10-CM

## 2019-02-04 RX ORDER — PREDNISONE 1 MG/1
5 TABLET ORAL DAILY
Qty: 90 TABLET | Refills: 3 | Status: SHIPPED | OUTPATIENT
Start: 2019-02-04 | End: 2019-09-09

## 2019-02-04 RX ORDER — ATORVASTATIN CALCIUM 20 MG/1
20 TABLET, FILM COATED ORAL NIGHTLY
Qty: 90 TABLET | Refills: 0 | Status: SHIPPED | OUTPATIENT
Start: 2019-02-04 | End: 2019-04-29

## 2019-02-04 RX ORDER — MYCOPHENOLATE MOFETIL 500 MG/1
1000 TABLET ORAL 2 TIMES DAILY
Qty: 360 TABLET | Refills: 3 | Status: SHIPPED | OUTPATIENT
Start: 2019-02-04 | End: 2020-04-06

## 2019-02-04 RX ORDER — LOSARTAN POTASSIUM 50 MG/1
50 TABLET ORAL DAILY
Qty: 90 TABLET | Refills: 3 | Status: SHIPPED | OUTPATIENT
Start: 2019-02-04 | End: 2019-05-05

## 2019-02-04 RX ORDER — CYCLOSPORINE 25 MG/1
CAPSULE, GELATIN COATED ORAL
Qty: 450 CAPSULE | Refills: 3 | Status: SHIPPED | OUTPATIENT
Start: 2019-02-04 | End: 2020-04-06

## 2019-03-27 ENCOUNTER — PATIENT OUTREACH (OUTPATIENT)
Dept: FAMILY MEDICINE CLINIC | Facility: CLINIC | Age: 59
End: 2019-03-27

## 2019-04-08 ENCOUNTER — NURSE ONLY (OUTPATIENT)
Dept: FAMILY MEDICINE CLINIC | Facility: CLINIC | Age: 59
End: 2019-04-08

## 2019-04-08 VITALS — SYSTOLIC BLOOD PRESSURE: 136 MMHG | HEART RATE: 72 BPM | DIASTOLIC BLOOD PRESSURE: 78 MMHG

## 2019-04-08 NOTE — PROGRESS NOTES
Pt was in office today for NV for BP Check    Pt did take Losartan 50mg this morning about 8 am    First check 144/82 in Right arm sitting P 72    Pt was asked to wait 5 min and RN would recheck    2nd check  136/78 P 72    Pt tolerated and was sent home i

## 2019-04-11 ENCOUNTER — LAB ENCOUNTER (OUTPATIENT)
Dept: LAB | Age: 59
End: 2019-04-11
Attending: INTERNAL MEDICINE
Payer: COMMERCIAL

## 2019-04-11 DIAGNOSIS — Z94.0 STATUS POST KIDNEY TRANSPLANT: ICD-10-CM

## 2019-04-11 DIAGNOSIS — E11.22 DIABETES MELLITUS WITH STAGE 3 CHRONIC KIDNEY DISEASE (HCC): ICD-10-CM

## 2019-04-11 DIAGNOSIS — Z94.0 HISTORY OF RENAL TRANSPLANTATION: ICD-10-CM

## 2019-04-11 DIAGNOSIS — E78.2 MIXED HYPERLIPIDEMIA: ICD-10-CM

## 2019-04-11 DIAGNOSIS — I10 ESSENTIAL HYPERTENSION: ICD-10-CM

## 2019-04-11 DIAGNOSIS — N18.30 CKD (CHRONIC KIDNEY DISEASE) STAGE 3, GFR 30-59 ML/MIN (HCC): ICD-10-CM

## 2019-04-11 DIAGNOSIS — N18.30 DIABETES MELLITUS WITH STAGE 3 CHRONIC KIDNEY DISEASE (HCC): ICD-10-CM

## 2019-04-11 DIAGNOSIS — Z94.0 RENAL TRANSPLANT RECIPIENT: ICD-10-CM

## 2019-04-11 PROCEDURE — 80158 DRUG ASSAY CYCLOSPORINE: CPT | Performed by: INTERNAL MEDICINE

## 2019-04-11 PROCEDURE — 80053 COMPREHEN METABOLIC PANEL: CPT | Performed by: INTERNAL MEDICINE

## 2019-04-11 PROCEDURE — 36415 COLL VENOUS BLD VENIPUNCTURE: CPT | Performed by: INTERNAL MEDICINE

## 2019-04-11 PROCEDURE — 85025 COMPLETE CBC W/AUTO DIFF WBC: CPT | Performed by: INTERNAL MEDICINE

## 2019-04-11 PROCEDURE — 81001 URINALYSIS AUTO W/SCOPE: CPT | Performed by: INTERNAL MEDICINE

## 2019-04-11 PROCEDURE — 80061 LIPID PANEL: CPT | Performed by: INTERNAL MEDICINE

## 2019-04-29 DIAGNOSIS — N18.30 CKD (CHRONIC KIDNEY DISEASE) STAGE 3, GFR 30-59 ML/MIN (HCC): ICD-10-CM

## 2019-04-29 DIAGNOSIS — Z94.0 RENAL TRANSPLANT RECIPIENT: ICD-10-CM

## 2019-04-30 RX ORDER — ATORVASTATIN CALCIUM 20 MG/1
TABLET, FILM COATED ORAL
Qty: 90 TABLET | Refills: 1 | Status: SHIPPED | OUTPATIENT
Start: 2019-04-30 | End: 2019-09-09

## 2019-05-30 ENCOUNTER — HOSPITAL ENCOUNTER (OUTPATIENT)
Dept: MAMMOGRAPHY | Facility: HOSPITAL | Age: 59
Discharge: HOME | End: 2019-05-30
Attending: ADVANCED PRACTICE MIDWIFE
Payer: COMMERCIAL

## 2019-05-30 PROCEDURE — 77063 BREAST TOMOSYNTHESIS BI: CPT | Performed by: ADVANCED PRACTICE MIDWIFE

## 2019-05-30 PROCEDURE — 77067 SCR MAMMO BI INCL CAD: CPT | Performed by: ADVANCED PRACTICE MIDWIFE

## 2019-08-09 ENCOUNTER — LAB ENCOUNTER (OUTPATIENT)
Dept: LAB | Age: 59
End: 2019-08-09
Attending: INTERNAL MEDICINE
Payer: COMMERCIAL

## 2019-08-09 DIAGNOSIS — N18.30 CHRONIC KIDNEY DISEASE, STAGE III (MODERATE) (HCC): Primary | ICD-10-CM

## 2019-08-09 DIAGNOSIS — Z94.0 KIDNEY REPLACED BY TRANSPLANT: ICD-10-CM

## 2019-08-09 PROCEDURE — 36415 COLL VENOUS BLD VENIPUNCTURE: CPT | Performed by: INTERNAL MEDICINE

## 2019-08-09 PROCEDURE — 80158 DRUG ASSAY CYCLOSPORINE: CPT | Performed by: INTERNAL MEDICINE

## 2019-08-11 LAB — CYCLOSPORINE A: 92.8 NG/ML

## 2019-08-12 DIAGNOSIS — Z94.0 STATUS POST KIDNEY TRANSPLANT: Primary | ICD-10-CM

## 2019-09-09 ENCOUNTER — HOSPITAL ENCOUNTER (OUTPATIENT)
Dept: GENERAL RADIOLOGY | Age: 59
Discharge: HOME | End: 2019-09-09
Attending: FAMILY MEDICINE
Payer: COMMERCIAL

## 2019-09-09 ENCOUNTER — OFFICE VISIT (OUTPATIENT)
Dept: FAMILY MEDICINE CLINIC | Facility: CLINIC | Age: 59
End: 2019-09-09
Payer: COMMERCIAL

## 2019-09-09 VITALS
BODY MASS INDEX: 36.64 KG/M2 | HEART RATE: 64 BPM | SYSTOLIC BLOOD PRESSURE: 134 MMHG | TEMPERATURE: 97 F | DIASTOLIC BLOOD PRESSURE: 74 MMHG | OXYGEN SATURATION: 99 % | HEIGHT: 60 IN | RESPIRATION RATE: 18 BRPM | WEIGHT: 186.63 LBS

## 2019-09-09 DIAGNOSIS — M43.16 SPONDYLOLISTHESIS, LUMBAR REGION: ICD-10-CM

## 2019-09-09 DIAGNOSIS — M54.50 ACUTE BILATERAL LOW BACK PAIN WITHOUT SCIATICA: ICD-10-CM

## 2019-09-09 DIAGNOSIS — M41.56 OTHER SECONDARY SCOLIOSIS, LUMBAR REGION: ICD-10-CM

## 2019-09-09 DIAGNOSIS — M54.50 ACUTE BILATERAL LOW BACK PAIN WITHOUT SCIATICA: Primary | ICD-10-CM

## 2019-09-09 DIAGNOSIS — M62.830 LUMBAR PARASPINAL MUSCLE SPASM: ICD-10-CM

## 2019-09-09 PROCEDURE — 99214 OFFICE O/P EST MOD 30 MIN: CPT | Performed by: FAMILY MEDICINE

## 2019-09-09 PROCEDURE — 72110 X-RAY EXAM L-2 SPINE 4/>VWS: CPT | Performed by: FAMILY MEDICINE

## 2019-09-09 RX ORDER — CYCLOBENZAPRINE HCL 10 MG
10 TABLET ORAL NIGHTLY
Qty: 15 TABLET | Refills: 1 | Status: SHIPPED | OUTPATIENT
Start: 2019-09-09 | End: 2019-09-24

## 2019-09-09 NOTE — PROGRESS NOTES
Luzmaria Sagastume is a 62year old female. HPI:   Rosette Vizcaino has been having pain in her back since may when  she was in [place] for 10 weeks. They have an RV and the bed is terrible, it is worse  When she gets up in the morning.  No radiation, is across her lower b daily. Disp: 360 tablet Rfl: 3      Past Medical History:   Diagnosis Date   • Glomerulonephritis    • Other and unspecified hyperlipidemia    • Type II or unspecified type diabetes mellitus without mention of complication, not stated as uncontrolled    • REFERRAL TO [facility]     The patient indicates understanding of these issues and agrees to the plan. The patient is asked to return in after she completes PT, or sooner if it fails.

## 2019-09-17 ENCOUNTER — OFFICE VISIT (OUTPATIENT)
Dept: PHYSICAL THERAPY | Age: 59
End: 2019-09-17
Attending: FAMILY MEDICINE
Payer: COMMERCIAL

## 2019-09-17 DIAGNOSIS — M54.50 ACUTE BILATERAL LOW BACK PAIN WITHOUT SCIATICA: ICD-10-CM

## 2019-09-17 PROCEDURE — 97161 PT EVAL LOW COMPLEX 20 MIN: CPT

## 2019-09-17 PROCEDURE — 97110 THERAPEUTIC EXERCISES: CPT

## 2019-09-17 NOTE — PROGRESS NOTES
INITIAL EVALUATION:   Referring Physician: Dr. Madan Chung  Diagnosis: Acute bilateral low back pain without sciatica (M54.5)     Date of Service: 9/17/2019     PATIENT SUMMARY   Sabiha Agee is a 62year old female who presents to therapy today with complaint flexion)  Extension: WNL*  Sidebending: R WNL; L WNL  Rotation: R WNL; L WNL    Repeated Motions:  -Standing Flexion + reduction  -Standing Extension + provacation  -Supine/Seated Flexion + reduction  -Prone Extension + provacation    PROM/Accessory motion voiced understanding and performs HEP correctly without reported pain. Skilled Physical Therapy is medically necessary to address the above impairments and reach functional goals.      Precautions:  None    Based on clinical rationale and outcome measures,

## 2019-09-18 ENCOUNTER — OFFICE VISIT (OUTPATIENT)
Dept: PHYSICAL THERAPY | Age: 59
End: 2019-09-18
Attending: FAMILY MEDICINE
Payer: COMMERCIAL

## 2019-09-18 DIAGNOSIS — M54.50 ACUTE BILATERAL LOW BACK PAIN WITHOUT SCIATICA: ICD-10-CM

## 2019-09-18 PROCEDURE — 97140 MANUAL THERAPY 1/> REGIONS: CPT

## 2019-09-18 PROCEDURE — 97110 THERAPEUTIC EXERCISES: CPT

## 2019-10-07 ENCOUNTER — LAB ENCOUNTER (OUTPATIENT)
Dept: LAB | Age: 59
End: 2019-10-07
Attending: INTERNAL MEDICINE
Payer: COMMERCIAL

## 2019-10-07 DIAGNOSIS — Z94.0 STATUS POST KIDNEY TRANSPLANT: ICD-10-CM

## 2019-10-07 DIAGNOSIS — E78.2 MIXED HYPERLIPIDEMIA: ICD-10-CM

## 2019-10-07 PROCEDURE — 80061 LIPID PANEL: CPT | Performed by: INTERNAL MEDICINE

## 2019-10-07 PROCEDURE — 36415 COLL VENOUS BLD VENIPUNCTURE: CPT | Performed by: INTERNAL MEDICINE

## 2019-10-07 PROCEDURE — 80053 COMPREHEN METABOLIC PANEL: CPT | Performed by: INTERNAL MEDICINE

## 2019-10-07 PROCEDURE — 81001 URINALYSIS AUTO W/SCOPE: CPT | Performed by: INTERNAL MEDICINE

## 2019-10-08 ENCOUNTER — OFFICE VISIT (OUTPATIENT)
Dept: PHYSICAL THERAPY | Age: 59
End: 2019-10-08
Attending: FAMILY MEDICINE
Payer: COMMERCIAL

## 2019-10-08 PROCEDURE — 97140 MANUAL THERAPY 1/> REGIONS: CPT

## 2019-10-08 PROCEDURE — 97110 THERAPEUTIC EXERCISES: CPT

## 2019-10-08 PROCEDURE — 97112 NEUROMUSCULAR REEDUCATION: CPT

## 2019-10-08 NOTE — PROGRESS NOTES
Dx: Acute bilateral low back pain without sciatica (M54.5)            Insurance (Authorized # of Visits):  [employer] (100 visit limit)           Authorizing Physician: Dr. Archana Jimenez  Next MD visit: none scheduled  Fall Risk: standard         Precautions: n/a 30\" x 3 ea  3 Way SB Lumbar Flexion Stretch x 10 ea      NMREED:  Supine TvA Engagement 10 x 10\"H NMREED:  Supine PPT w/ march (feedback within 10 mmHg) 3 x 10      HEP: mod shameka stretch, 3 way lumbar flexion stretch, clams, wall squats, bridge    Najma

## 2019-10-10 ENCOUNTER — APPOINTMENT (OUTPATIENT)
Dept: PHYSICAL THERAPY | Age: 59
End: 2019-10-10
Attending: FAMILY MEDICINE
Payer: COMMERCIAL

## 2019-10-15 ENCOUNTER — OFFICE VISIT (OUTPATIENT)
Dept: PHYSICAL THERAPY | Age: 59
End: 2019-10-15
Attending: FAMILY MEDICINE
Payer: COMMERCIAL

## 2019-10-15 DIAGNOSIS — M54.50 ACUTE BILATERAL LOW BACK PAIN WITHOUT SCIATICA: ICD-10-CM

## 2019-10-15 PROCEDURE — 97140 MANUAL THERAPY 1/> REGIONS: CPT

## 2019-10-15 PROCEDURE — 97110 THERAPEUTIC EXERCISES: CPT

## 2019-10-15 NOTE — PROGRESS NOTES
Dx: Acute bilateral low back pain without sciatica (M54.5)            Insurance (Authorized # of Visits):  [employer] (100 visit limit)           Authorizing Physician: Dr. Marlo Pruitt  Next MD visit: none scheduled  Fall Risk: standard         Precautions: n/a 5'  S/L Hip Flexor Stretch 30\" x 3 ea  S/L Clams 2 x 20 ea  B Bridge 2 x 10  Wall Squats 3 x 12  3 Way SB Lumbar Flexion Stretch x 10 ea THEREX:  Figure 4 Glute Stretch 30\" x 3 ea  S/L Hip Flexor Stretch 30\" x 3 ea  3 Way SB Lumbar Flexion Stretch x 10

## 2019-10-17 ENCOUNTER — OFFICE VISIT (OUTPATIENT)
Dept: PHYSICAL THERAPY | Age: 59
End: 2019-10-17
Attending: FAMILY MEDICINE
Payer: COMMERCIAL

## 2019-10-17 DIAGNOSIS — M54.50 ACUTE BILATERAL LOW BACK PAIN WITHOUT SCIATICA: ICD-10-CM

## 2019-10-17 PROCEDURE — 97110 THERAPEUTIC EXERCISES: CPT

## 2019-10-17 PROCEDURE — 97140 MANUAL THERAPY 1/> REGIONS: CPT

## 2019-10-17 NOTE — PROGRESS NOTES
Dx: Acute bilateral low back pain without sciatica (M54.5)            Insurance (Authorized # of Visits):  [employer] (100 visit limit)           Authorizing Physician: Dr. Faith Pacheco  Next MD visit: none scheduled  Fall Risk: standard         Precautions: n/a Way SB Lumbar Flexion Stretch x 10 ea THEREX:  Figure 4 Glute Stretch 30\" x 3 ea  S/L Hip Flexor Stretch 30\" x 3 ea  3 Way SB Lumbar Flexion Stretch x 10 ea THEREX:  Figure 4 Glute Stretch 30\" x 3 ea  S/L Hip Flexor Stretch 30\" x 3 ea  3 Way SB Lumbar

## 2019-10-22 ENCOUNTER — OFFICE VISIT (OUTPATIENT)
Dept: PHYSICAL THERAPY | Age: 59
End: 2019-10-22
Attending: FAMILY MEDICINE
Payer: COMMERCIAL

## 2019-10-22 ENCOUNTER — HOSPITAL ENCOUNTER (OUTPATIENT)
Dept: CT IMAGING | Facility: HOSPITAL | Age: 59
Discharge: HOME | End: 2019-10-22
Attending: FAMILY MEDICINE

## 2019-10-22 DIAGNOSIS — Z13.6 SCREENING FOR CARDIOVASCULAR CONDITION: ICD-10-CM

## 2019-10-22 DIAGNOSIS — M54.50 ACUTE BILATERAL LOW BACK PAIN WITHOUT SCIATICA: ICD-10-CM

## 2019-10-22 PROCEDURE — 97140 MANUAL THERAPY 1/> REGIONS: CPT

## 2019-10-22 PROCEDURE — 97110 THERAPEUTIC EXERCISES: CPT

## 2019-10-22 NOTE — PROGRESS NOTES
Dx: Acute bilateral low back pain without sciatica (M54.5)            Insurance (Authorized # of Visits):  [employer] [place] (100 visit limit)           Authorizing Physician: Dr. Sean Ness  Next MD visit: none scheduled  Fall Risk: standard         Precautions: n/a Orrspelsv 7 2 x 10  Wall Squats 3 x 12  3 Way SB Lumbar Flexion Stretch x 10 ea THEREX:  Figure 4 Glute Stretch 30\" x 3 ea  S/L Hip Flexor Stretch 30\" x 3 ea  3 Way SB Lumbar Flexion Stretch x 10 ea THEREX:  Figure 4 Glute Stretch 30\" x 3 ea  S/L Hi

## 2019-10-23 ENCOUNTER — TELEPHONE (OUTPATIENT)
Dept: FAMILY MEDICINE CLINIC | Facility: CLINIC | Age: 59
End: 2019-10-23

## 2019-10-23 NOTE — TELEPHONE ENCOUNTER
----- Message from iH Briscoe DO sent at 10/23/2019  8:23 AM CDT -----  Can n otify Sandra that her Heart scan score was under 100, and her LIPIDS last time looked good Tchol under 170, and LDL under 70,,,she needs to stay on the atorvastatin at the curr

## 2019-10-24 ENCOUNTER — OFFICE VISIT (OUTPATIENT)
Dept: PHYSICAL THERAPY | Age: 59
End: 2019-10-24
Attending: FAMILY MEDICINE
Payer: COMMERCIAL

## 2019-10-24 DIAGNOSIS — M54.50 ACUTE BILATERAL LOW BACK PAIN WITHOUT SCIATICA: ICD-10-CM

## 2019-10-24 PROCEDURE — 97110 THERAPEUTIC EXERCISES: CPT

## 2019-10-24 PROCEDURE — 97140 MANUAL THERAPY 1/> REGIONS: CPT

## 2019-10-24 NOTE — PROGRESS NOTES
Dx: Acute bilateral low back pain without sciatica (M54.5)            Insurance (Authorized # of Visits):  [employer] [place] (100 visit limit)           Authorizing Physician: Dr. Melanie Blanco  Next MD visit: none scheduled  Fall Risk: standard         Precautions: n/a x 12  3 Way SB Lumbar Flexion Stretch x 10 ea THEREX:  Figure 4 Glute Stretch 30\" x 3 ea  S/L Hip Flexor Stretch 30\" x 3 ea  3 Way SB Lumbar Flexion Stretch x 10 ea THEREX:  Figure 4 Glute Stretch 30\" x 3 ea  S/L Hip Flexor Stretch 30\" x 3 ea  3 Way SB

## 2019-10-29 ENCOUNTER — OFFICE VISIT (OUTPATIENT)
Dept: PHYSICAL THERAPY | Age: 59
End: 2019-10-29
Attending: FAMILY MEDICINE
Payer: COMMERCIAL

## 2019-10-29 DIAGNOSIS — M54.50 ACUTE BILATERAL LOW BACK PAIN WITHOUT SCIATICA: ICD-10-CM

## 2019-10-29 PROCEDURE — 97140 MANUAL THERAPY 1/> REGIONS: CPT

## 2019-10-29 PROCEDURE — 97110 THERAPEUTIC EXERCISES: CPT

## 2019-10-29 NOTE — PROGRESS NOTES
Progress Summary  Pt has attended 8 visits in Physical Therapy.    Dx: Acute bilateral low back pain without sciatica (M54.5)            Insurance (Authorized # of Visits):  [employer] (100 visit limit)           Authorizing Physician: Dr. Malcolm Glover MD vis Continue skilled Physical Therapy 2 x/week for an additional 6 visits over a 90 day period.  Treatment will include: Manual Therapy, Neuromuscular Re-education, Therapeutic Exercise and Home Exercise Program instruction       Patient/Family/Caregiver was ad Cat/Camel x 10 ea  S/L Hip Flexor Stretch 30\" x 3 ea  Supine Mini Sit Up RMB 3 x 10  Mini Bridge (focus on PPT)5\"H x 10 x 2  Standing Side Steps @ // Bars RTB x 20  Standing Monster Walks @ // Bars RTB x 20  Sit to [facility] 3 x 10  Hip Hinge Educatio

## 2019-10-31 ENCOUNTER — OFFICE VISIT (OUTPATIENT)
Dept: PHYSICAL THERAPY | Age: 59
End: 2019-10-31
Attending: FAMILY MEDICINE
Payer: COMMERCIAL

## 2019-10-31 DIAGNOSIS — M54.50 ACUTE BILATERAL LOW BACK PAIN WITHOUT SCIATICA: ICD-10-CM

## 2019-10-31 PROCEDURE — 97140 MANUAL THERAPY 1/> REGIONS: CPT

## 2019-10-31 PROCEDURE — 97110 THERAPEUTIC EXERCISES: CPT

## 2019-10-31 NOTE — PROGRESS NOTES
Dx: Acute bilateral low back pain without sciatica (M54.5)            Insurance (Authorized # of Visits):  [employer] (100 visit limit)           Authorizing Physician: Dr. Kathi Hurtado  Next MD visit: none scheduled  Fall Risk: standard         Precautions: n/a MANUAL:  Prone Lumbar PA Mobs x5' ea side L1-5   THEREX:  Figure 4 Glute Stretch 30\" x 3 ea  S/L Hip Flexor Stretch 30\" x 3 ea  3 Way SB Lumbar Flexion Stretch x 10 ea  S/L Hip Extension/ABD 2 x 10 ea  Mini Bridge (focus on PPT) w/ manual contacts at [facility]

## 2019-11-04 ENCOUNTER — HOSPITAL ENCOUNTER (OUTPATIENT)
Dept: CARDIOLOGY CLINIC | Facility: HOSPITAL | Age: 59
Discharge: HOME | End: 2019-11-04
Attending: FAMILY MEDICINE

## 2019-11-04 DIAGNOSIS — Z13.9 ENCOUNTER FOR SCREENING: ICD-10-CM

## 2019-11-05 ENCOUNTER — OFFICE VISIT (OUTPATIENT)
Dept: PHYSICAL THERAPY | Age: 59
End: 2019-11-05
Attending: FAMILY MEDICINE
Payer: COMMERCIAL

## 2019-11-05 ENCOUNTER — TELEPHONE (OUTPATIENT)
Dept: FAMILY MEDICINE CLINIC | Facility: CLINIC | Age: 59
End: 2019-11-05

## 2019-11-05 PROCEDURE — 97140 MANUAL THERAPY 1/> REGIONS: CPT

## 2019-11-05 PROCEDURE — 97110 THERAPEUTIC EXERCISES: CPT

## 2019-11-05 NOTE — PROGRESS NOTES
Dx: Acute bilateral low back pain without sciatica (M54.5)            Insurance (Authorized # of Visits):  [employer] (100 visit limit)           Authorizing Physician: Dr. Madan Glover MD visit: none scheduled  Fall Risk: standard         Precautions: n/a MANUAL:  Prone  Lumbar PA Mobs x5' ea side L1-5 MANUAL:  Prone Lumbar PA Mobs x5' ea side L1-5 MANUAL:  Prone Lumbar PA Mobs x5' ea side L1-5 MANUAL:  Prone Lumbar PA Mobs x5' ea side L1-5 MANUAL:  Prone Lumbar PA Mobs x5' ea side L1-5   THEREX:  Figure 4 Black TB @ knees x 20 ea  NuStep x 8'   NMREED: NMREED:   NMREED:   Ranulfo Masters:  Side Bridge 2 x 10 3\"H NMREED:   HEP: bridge, sidestep, chair squat, cat camel, kneeling hip flexor stretch, LTR    Charges: Manual x 1, TherEx x 2   Total Timed Treatment: 40 mi

## 2019-11-05 NOTE — TELEPHONE ENCOUNTER
----- Message from Jhonathan Watt DO sent at 11/5/2019  3:56 PM CST -----  Can notify Bob Jay her US showed she has some cholesterol, build up in both carotids, it looks like she was on atorvastatin last year, and she really should be on it to help prevent ad

## 2019-11-05 NOTE — TELEPHONE ENCOUNTER
PT advised of results- verbalized understanding    PT states she is currently taking 20mg Atorvastatin- any further recomendation

## 2019-11-07 ENCOUNTER — OFFICE VISIT (OUTPATIENT)
Dept: PHYSICAL THERAPY | Age: 59
End: 2019-11-07
Attending: FAMILY MEDICINE
Payer: COMMERCIAL

## 2019-11-07 PROCEDURE — 97140 MANUAL THERAPY 1/> REGIONS: CPT

## 2019-11-07 PROCEDURE — 97110 THERAPEUTIC EXERCISES: CPT

## 2019-11-07 NOTE — PROGRESS NOTES
Dx: Acute bilateral low back pain without sciatica (M54.5)            Insurance (Authorized # of Visits):  [employer] (100 visit limit)           Authorizing Physician: Dr. Magui Rahman  Next MD visit: none scheduled  Fall Risk: standard         Precautions: n/a MANUAL:  Prone Lumbar PA Mobs x5' ea side L1-5   THEREX:  Quad Cat/Camel x 10 ea  S/L Hip Flexor Stretch 30\" x 3 ea  Supine Mini Sit Up RMB 3 x 10  Mini Bridge (focus on PPT)5\"H x 10 x 2  Standing Side Steps @ // Bars RTB x 20  Standing [employer] @ /

## 2019-11-12 ENCOUNTER — OFFICE VISIT (OUTPATIENT)
Dept: PHYSICAL THERAPY | Age: 59
End: 2019-11-12
Attending: FAMILY MEDICINE
Payer: COMMERCIAL

## 2019-11-12 PROCEDURE — 97110 THERAPEUTIC EXERCISES: CPT

## 2019-11-12 PROCEDURE — 97140 MANUAL THERAPY 1/> REGIONS: CPT

## 2019-11-12 NOTE — PROGRESS NOTES
Dx: Acute bilateral low back pain without sciatica (M54.5)            Insurance (Authorized # of Visits):  [facility] (100 visit limit)           Authorizing Physician: Dr. Juan Carlos Lee  Next MD visit: none scheduled  Fall Risk: standard         Precautions: n/a (focus on PPT)5\"H x 10 x 2  Standing Side Steps @ // Bars RTB x 20  Standing Monster Walks @ // Bars RTB x 20  Sit to [facility] 3 x 10  Hip Hinge Education w/ 15# KB Lift from Hi Low Table  15# KB Carry 20 ft x 4 THEREX:  S/L Hip Flexor Stretch 30\" x

## 2019-11-14 ENCOUNTER — OFFICE VISIT (OUTPATIENT)
Dept: PHYSICAL THERAPY | Age: 59
End: 2019-11-14
Attending: FAMILY MEDICINE
Payer: COMMERCIAL

## 2019-11-14 PROCEDURE — 97140 MANUAL THERAPY 1/> REGIONS: CPT

## 2019-11-14 PROCEDURE — 97110 THERAPEUTIC EXERCISES: CPT

## 2019-11-14 PROCEDURE — 97112 NEUROMUSCULAR REEDUCATION: CPT

## 2019-11-14 NOTE — PROGRESS NOTES
Dx: Acute bilateral low back pain without sciatica (M54.5)            Insurance (Authorized # of Visits):  [employer] (100 visit limit)           Authorizing Physician: Dr. Lisha Bobo  Next MD visit: none scheduled  Fall Risk: standard         Precautions: n/a Lumbar PA Mobs x5' ea side L1-5   THEREX:  Quad Cat/Camel x 10 ea  S/L Hip Flexor Stretch 30\" x 3 ea  Supine Mini Sit Up RMB 3 x 10  Mini Bridge (focus on PPT)5\"H x 10 x 2  Standing Side Steps @ // Bars RTB x 20  Standing Monster Walks @ // Bars RTB x 20

## 2019-11-19 ENCOUNTER — OFFICE VISIT (OUTPATIENT)
Dept: PHYSICAL THERAPY | Age: 59
End: 2019-11-19
Attending: FAMILY MEDICINE
Payer: COMMERCIAL

## 2019-11-19 PROCEDURE — 97140 MANUAL THERAPY 1/> REGIONS: CPT

## 2019-11-19 PROCEDURE — 97110 THERAPEUTIC EXERCISES: CPT

## 2019-11-19 NOTE — PROGRESS NOTES
Progress Summary  Pt has attended 14 visits in Physical Therapy.    Dx: Acute bilateral low back pain without sciatica (M54.5)            Insurance (Authorized # of Visits):  [employer] (100 visit limit)           Authorizing Physician: Dr. Karina Glover MD vi eval)    Plan: Continue skilled Physical Therapy 1 x/week or for 4 visits over a 90 day period.  Treatment will include: NMREED, THerEx, manual therapy, and HEP instruction       Patient/Family/Caregiver was advised of these findings, precautions, and treat x 10 ea  SS @ // Monda Oven Black TB @ knees x 20 ea  NuStep x 8' THEREX:  NuStep x 8'  S/L Hip Flexor Stretch 30\" x 3 ea  Mini Bridge (focus on PPT) x 10 w/ march  Mini Lunge to 6\" step x 10 ea  SS @ // Monda Oven Black TB @ knees; YTB @ ankles x 20 ea  SB Pelvic T

## 2019-11-21 ENCOUNTER — APPOINTMENT (OUTPATIENT)
Dept: PHYSICAL THERAPY | Age: 59
End: 2019-11-21
Payer: COMMERCIAL

## 2019-11-25 ENCOUNTER — APPOINTMENT (OUTPATIENT)
Dept: PHYSICAL THERAPY | Age: 59
End: 2019-11-25
Payer: COMMERCIAL

## 2019-11-27 ENCOUNTER — OFFICE VISIT (OUTPATIENT)
Dept: PHYSICAL THERAPY | Age: 59
End: 2019-11-27
Attending: FAMILY MEDICINE
Payer: COMMERCIAL

## 2019-11-27 PROCEDURE — 97140 MANUAL THERAPY 1/> REGIONS: CPT

## 2019-11-27 PROCEDURE — 97110 THERAPEUTIC EXERCISES: CPT

## 2019-11-27 NOTE — PROGRESS NOTES
Dx: Acute bilateral low back pain without sciatica (M54.5)            Insurance (Authorized # of Visits):  [employer] (100 visit limit)           Authorizing Physician: Dr. Sisi Glover MD visit: none scheduled  Fall Risk: standard         Precautions: n/a PA Mobs x5' ea side L1-5 MANUAL:  S/L Rotation Gap 1' x 3 ea  Thoracic PA GIII T5-12 x 5' MANUAL:  Prone Lumbar PA Mobs x5' ea side L1-5  Thoracic PA GIII T5-12 x 5'   THEREX:  NuStep x 8'  Prone Quad/Hip Flexor Stretch 30\" x 3 ea  Frog Bridge 5\"H x 10

## 2019-12-03 ENCOUNTER — OFFICE VISIT (OUTPATIENT)
Dept: PHYSICAL THERAPY | Age: 59
End: 2019-12-03
Attending: FAMILY MEDICINE
Payer: COMMERCIAL

## 2019-12-03 PROCEDURE — 97110 THERAPEUTIC EXERCISES: CPT

## 2019-12-03 NOTE — PROGRESS NOTES
Discharge Summary  Pt has attended 16 visits in Physical Therapy.    Dx: Acute bilateral low back pain without sciatica (M54.5)            Insurance (Authorized # of Visits):  [employer] (100 visit limit)           Authorizing Physician: Dr. Emeka Glover MD v time. Pt to continue working on independent HEP to continue improvement of condition. Pt advised to call/contact with any questions or regression of condition.     Patient/Family/Caregiver was advised of these findings, precautions, and treatment options an 40 min  Total Treatment Time: 40 min

## 2019-12-10 ENCOUNTER — APPOINTMENT (OUTPATIENT)
Dept: PHYSICAL THERAPY | Age: 59
End: 2019-12-10
Payer: COMMERCIAL

## 2019-12-26 DIAGNOSIS — Z94.0 STATUS POST KIDNEY TRANSPLANT: Primary | ICD-10-CM

## 2019-12-30 ENCOUNTER — LABORATORY ENCOUNTER (OUTPATIENT)
Dept: LAB | Age: 59
End: 2019-12-30
Attending: FAMILY MEDICINE
Payer: COMMERCIAL

## 2019-12-30 DIAGNOSIS — Z94.0 STATUS POST KIDNEY TRANSPLANT: Primary | ICD-10-CM

## 2019-12-30 LAB
ALBUMIN SERPL-MCNC: 3.3 G/DL (ref 3.4–5)
ALBUMIN/GLOB SERPL: 0.9 {RATIO} (ref 1–2)
ALP LIVER SERPL-CCNC: 78 U/L (ref 50–130)
ALT SERPL-CCNC: 18 U/L (ref 13–56)
ANION GAP SERPL CALC-SCNC: 6 MMOL/L (ref 0–18)
AST SERPL-CCNC: 13 U/L (ref 15–37)
BASOPHILS # BLD AUTO: 0.07 X10(3) UL (ref 0–0.2)
BASOPHILS NFR BLD AUTO: 0.9 %
BILIRUB SERPL-MCNC: 0.7 MG/DL (ref 0.1–2)
BILIRUB UR QL STRIP.AUTO: NEGATIVE
BUN BLD-MCNC: 32 MG/DL (ref 7–18)
BUN/CREAT SERPL: 22.1 (ref 10–20)
CALCIUM BLD-MCNC: 9.7 MG/DL (ref 8.5–10.1)
CHLORIDE SERPL-SCNC: 110 MMOL/L (ref 98–112)
CO2 SERPL-SCNC: 26 MMOL/L (ref 21–32)
COLOR UR AUTO: YELLOW
CREAT BLD-MCNC: 1.45 MG/DL (ref 0.55–1.02)
DEPRECATED RDW RBC AUTO: 46.5 FL (ref 35.1–46.3)
EOSINOPHIL # BLD AUTO: 0.18 X10(3) UL (ref 0–0.7)
EOSINOPHIL NFR BLD AUTO: 2.4 %
ERYTHROCYTE [DISTWIDTH] IN BLOOD BY AUTOMATED COUNT: 13 % (ref 11–15)
GLOBULIN PLAS-MCNC: 3.8 G/DL (ref 2.8–4.4)
GLUCOSE BLD-MCNC: 96 MG/DL (ref 70–99)
GLUCOSE UR STRIP.AUTO-MCNC: NEGATIVE MG/DL
HCT VFR BLD AUTO: 38 % (ref 35–48)
HGB BLD-MCNC: 11.8 G/DL (ref 12–16)
HYALINE CASTS #/AREA URNS AUTO: PRESENT /LPF
IMM GRANULOCYTES # BLD AUTO: 0.01 X10(3) UL (ref 0–1)
IMM GRANULOCYTES NFR BLD: 0.1 %
KETONES UR STRIP.AUTO-MCNC: NEGATIVE MG/DL
LYMPHOCYTES # BLD AUTO: 2.53 X10(3) UL (ref 1–4)
LYMPHOCYTES NFR BLD AUTO: 33.8 %
M PROTEIN MFR SERPL ELPH: 7.1 G/DL (ref 6.4–8.2)
MCH RBC QN AUTO: 30.6 PG (ref 26–34)
MCHC RBC AUTO-ENTMCNC: 31.1 G/DL (ref 31–37)
MCV RBC AUTO: 98.7 FL (ref 80–100)
MONOCYTES # BLD AUTO: 0.63 X10(3) UL (ref 0.1–1)
MONOCYTES NFR BLD AUTO: 8.4 %
NEUTROPHILS # BLD AUTO: 4.07 X10 (3) UL (ref 1.5–7.7)
NEUTROPHILS # BLD AUTO: 4.07 X10(3) UL (ref 1.5–7.7)
NEUTROPHILS NFR BLD AUTO: 54.4 %
NITRITE UR QL STRIP.AUTO: NEGATIVE
OSMOLALITY SERPL CALC.SUM OF ELEC: 301 MOSM/KG (ref 275–295)
PATIENT FASTING Y/N/NP: YES
PH UR STRIP.AUTO: 5 [PH] (ref 4.5–8)
PLATELET # BLD AUTO: 309 10(3)UL (ref 150–450)
POTASSIUM SERPL-SCNC: 4.1 MMOL/L (ref 3.5–5.1)
PROT UR STRIP.AUTO-MCNC: NEGATIVE MG/DL
RBC # BLD AUTO: 3.85 X10(6)UL (ref 3.8–5.3)
SODIUM SERPL-SCNC: 142 MMOL/L (ref 136–145)
SP GR UR STRIP.AUTO: 1.02 (ref 1–1.03)
UROBILINOGEN UR STRIP.AUTO-MCNC: <2 MG/DL
WBC # BLD AUTO: 7.5 X10(3) UL (ref 4–11)

## 2019-12-30 PROCEDURE — 85025 COMPLETE CBC W/AUTO DIFF WBC: CPT | Performed by: INTERNAL MEDICINE

## 2019-12-30 PROCEDURE — 36415 COLL VENOUS BLD VENIPUNCTURE: CPT | Performed by: INTERNAL MEDICINE

## 2019-12-30 PROCEDURE — 81001 URINALYSIS AUTO W/SCOPE: CPT | Performed by: INTERNAL MEDICINE

## 2019-12-30 PROCEDURE — 80158 DRUG ASSAY CYCLOSPORINE: CPT | Performed by: INTERNAL MEDICINE

## 2019-12-30 PROCEDURE — 80053 COMPREHEN METABOLIC PANEL: CPT | Performed by: INTERNAL MEDICINE

## 2020-01-02 LAB — CYCLOSPORINE A: 58.9 NG/ML

## 2020-01-13 ENCOUNTER — TELEPHONE (OUTPATIENT)
Dept: FAMILY MEDICINE CLINIC | Facility: CLINIC | Age: 60
End: 2020-01-13

## 2020-01-13 ENCOUNTER — OFFICE VISIT (OUTPATIENT)
Dept: PHYSICAL THERAPY | Age: 60
End: 2020-01-13
Attending: FAMILY MEDICINE
Payer: COMMERCIAL

## 2020-01-13 DIAGNOSIS — M54.50 CHRONIC BILATERAL LOW BACK PAIN WITHOUT SCIATICA: Primary | ICD-10-CM

## 2020-01-13 DIAGNOSIS — G89.29 CHRONIC BILATERAL LOW BACK PAIN WITHOUT SCIATICA: Primary | ICD-10-CM

## 2020-01-13 PROCEDURE — 97110 THERAPEUTIC EXERCISES: CPT

## 2020-01-13 PROCEDURE — 97161 PT EVAL LOW COMPLEX 20 MIN: CPT

## 2020-01-13 NOTE — PROGRESS NOTES
INITIAL EVALUATION:   Referring Physician: Dr. Faith Pacheco  Diagnosis: Acute bilateral low back pain without sciatica (M54.5)     Date of Service: 1/13/2020     PATIENT SUMMARY   Bryce Restrepo is a 62year old female who presents to therapy today with complaint cm  Rotation: R 50%*; L 50%*    Repeated Motions:  -Standing Flexion + reduction  -Standing Extension + provocation  -Supine/Seated Flexion + reduction  -Prone Extension + provocation    Muscle Strength:   LE   Hip flexion (L2): R 4+/5; L 4+/5  Knee extens bed mobility, sit to stand, rotation in car, and overhead reaching. Pt and PT discussed evaluation findings, pathology, POC and HEP. Pt voiced understanding and performs HEP correctly without reported pain.  Skilled Physical Therapy is medically necessary

## 2020-01-13 NOTE — TELEPHONE ENCOUNTER
Dany Jamil from PT [facility] states Pt came back to Pt today restart therapy for low back pain    Pt had referral back in September and Dany Jamil saw her at that time. He wants to know if we can place new referral or of you need to see pt?     Please advise

## 2020-01-16 ENCOUNTER — OFFICE VISIT (OUTPATIENT)
Dept: PHYSICAL THERAPY | Age: 60
End: 2020-01-16
Attending: FAMILY MEDICINE
Payer: COMMERCIAL

## 2020-01-16 ENCOUNTER — OFFICE VISIT (OUTPATIENT)
Dept: NEPHROLOGY | Facility: CLINIC | Age: 60
End: 2020-01-16
Payer: COMMERCIAL

## 2020-01-16 VITALS
DIASTOLIC BLOOD PRESSURE: 76 MMHG | HEART RATE: 79 BPM | WEIGHT: 190.38 LBS | HEIGHT: 61 IN | RESPIRATION RATE: 16 BRPM | OXYGEN SATURATION: 98 % | BODY MASS INDEX: 35.94 KG/M2 | SYSTOLIC BLOOD PRESSURE: 132 MMHG

## 2020-01-16 DIAGNOSIS — N18.30 CKD (CHRONIC KIDNEY DISEASE) STAGE 3, GFR 30-59 ML/MIN (HCC): Primary | ICD-10-CM

## 2020-01-16 DIAGNOSIS — Z94.0 RENAL TRANSPLANT RECIPIENT: ICD-10-CM

## 2020-01-16 DIAGNOSIS — I10 ESSENTIAL HYPERTENSION: ICD-10-CM

## 2020-01-16 PROCEDURE — 97140 MANUAL THERAPY 1/> REGIONS: CPT

## 2020-01-16 PROCEDURE — 99214 OFFICE O/P EST MOD 30 MIN: CPT | Performed by: INTERNAL MEDICINE

## 2020-01-16 PROCEDURE — 97110 THERAPEUTIC EXERCISES: CPT

## 2020-01-16 RX ORDER — MELATONIN
400
COMMUNITY

## 2020-01-16 RX ORDER — OMEGA-3-ACID ETHYL ESTERS 1 G/1
3000 CAPSULE, LIQUID FILLED ORAL
COMMUNITY

## 2020-01-16 NOTE — PROGRESS NOTES
Nephrology Progress Note      Ese Novak is a 62year old female. HPI:   Patient presents with:   Follow - Up: shelley Carrasco was seen in the nephrology clinic today in follow-up for management of chronic kidney disease stage III in the setting Mofetil (CELLCEPT) 500 MG Oral Tab Take 2 tablets (1,000 mg total) by mouth 2 (two) times daily. 360 tablet 3   • atorvastatin (LIPITOR) 20 MG Oral Tab Take 1 tablet (20 mg total) by mouth daily.  90 tablet 1   • Mycophenolate Mofetil 500 MG Oral Tab Take 2 MMM  Neck: Supple, no ANDRES or thyromegaly  Cardiac: Regular rate and rhythm, S1, S2 normal, no murmur or rub  Lungs: Clear without wheezes, rales, rhonchi. Extremities: Without clubbing, cyanosis or edema.   Neurologic: Alert and oriented, normal affect, WBCUR 11-20 (A) 12/30/2019    RBCUR 0-2 12/30/2019    EPIUR Small 12/30/2019    BACUR 2+ (A) 12/30/2019    HYLUR Present (A) 12/30/2019     ASSESSMENT/PLAN:     #1.  Chronic kidney disease stage III-she is had longstanding and very stable chronic kidney di

## 2020-01-16 NOTE — PROGRESS NOTES
Dx: Acute bilateral low back pain without sciatica (M54.5)             Insurance (Authorized # of Visits):  [facility] [place] Physician: Dr. Perez Romero  Next MD visit: none scheduled  Fall Risk: standard         Precautions: n/a             Subject

## 2020-01-20 ENCOUNTER — OFFICE VISIT (OUTPATIENT)
Dept: PHYSICAL THERAPY | Age: 60
End: 2020-01-20
Attending: FAMILY MEDICINE
Payer: COMMERCIAL

## 2020-01-20 PROCEDURE — 97140 MANUAL THERAPY 1/> REGIONS: CPT

## 2020-01-20 PROCEDURE — 97110 THERAPEUTIC EXERCISES: CPT

## 2020-01-20 NOTE — PROGRESS NOTES
Dx: Acute bilateral low back pain without sciatica (M54.5)             Insurance (Authorized # of Visits):  [facility]i Doll Physician: Dr. Sisi Kemp  Next MD visit: none scheduled  Fall Risk: standard         Precautions: n/a             Subject Resist x 10 ea  NuStep x 10' THEREX:  Hamstring Stretch 30\" x 3  Hip Flexor Stretch 30\" x 3  LTR w/ Resist for Oblique x 10 ea  Resisted Flexion Iso 5\" x 10  SB Wall Squat 3 x 10      NMREED: NMREED:      HEP: hip flexor stretch, SKTC, lumbar flexion st

## 2020-01-23 ENCOUNTER — OFFICE VISIT (OUTPATIENT)
Dept: PHYSICAL THERAPY | Age: 60
End: 2020-01-23
Attending: FAMILY MEDICINE
Payer: COMMERCIAL

## 2020-01-23 PROCEDURE — 97110 THERAPEUTIC EXERCISES: CPT

## 2020-01-23 NOTE — PROGRESS NOTES
Dx: Acute bilateral low back pain without sciatica (M54.5)             Insurance (Authorized # of Visits):  Jarad Kaur Physician: Dr. Dickson Woodard  Next MD visit: none scheduled  Fall Risk: standard         Precautions: n/a             Subject ea  NuStep x 10' THEREX:  Hamstring Stretch 30\" x 3  Hip Flexor Stretch 30\" x 3  LTR w/ Resist for Oblique x 10 ea  Resisted Flexion Iso 5\" x 10  SB Wall Squat 3 x 10 THEREX:  Hamstring Stretch 30\" x 3  Hip Flexor Stretch 30\" x 3  S/L Hip ABD 2 x 10 e

## 2020-01-27 ENCOUNTER — OFFICE VISIT (OUTPATIENT)
Dept: PHYSICAL THERAPY | Age: 60
End: 2020-01-27
Attending: FAMILY MEDICINE
Payer: COMMERCIAL

## 2020-01-27 PROCEDURE — 97110 THERAPEUTIC EXERCISES: CPT

## 2020-01-27 NOTE — PROGRESS NOTES
Dx: Acute bilateral low back pain without sciatica (M54.5)             Insurance (Authorized # of Visits):  [place] [place] Physician: Dr. Pranay Marie  Next MD visit: none scheduled  Fall Risk: standard         Precautions: n/a             Subject x 10 x 5\"H  Bridge w/ B Hip ABD 1 x 10 x 5\"H  Supine Hip ER/IR w/ Manual Resist x 10 ea  NuStep x 10' THEREX:  Hamstring Stretch 30\" x 3  Hip Flexor Stretch 30\" x 3  LTR w/ Resist for Oblique x 10 ea  Resisted Flexion Iso 5\" x 10  SB Wall Squat 3 x 10

## 2020-01-30 ENCOUNTER — OFFICE VISIT (OUTPATIENT)
Dept: PHYSICAL THERAPY | Age: 60
End: 2020-01-30
Attending: FAMILY MEDICINE
Payer: COMMERCIAL

## 2020-01-30 PROCEDURE — 97110 THERAPEUTIC EXERCISES: CPT

## 2020-01-30 NOTE — PROGRESS NOTES
Progress Summary  Pt has attended 6 visits in Physical Therapy. Dx: Acute bilateral low back pain without sciatica (M54.5)             Insurance (Authorized # of Visits):  [facility] Phys[facility]: Dr. Solo ref.  provider found  Next MD vis additional visits over a 90 day period.  Treatment will include: Manual Therapy, Neuromuscular Re-education, Therapeutic Exercise, Home Exercise Program instruction and Modalities to include: Electrical stimulation (unattended)       Patient/Family/Caregive

## 2020-02-13 ENCOUNTER — OFFICE VISIT (OUTPATIENT)
Dept: PHYSICAL THERAPY | Age: 60
End: 2020-02-13
Attending: FAMILY MEDICINE
Payer: COMMERCIAL

## 2020-02-13 PROCEDURE — 97110 THERAPEUTIC EXERCISES: CPT

## 2020-02-13 NOTE — PROGRESS NOTES
Discharge Summary  Pt has attended 7 visits in Physical Therapy.    Dx: Acute bilateral low back pain without sciatica (M54.5)             Insurance (Authorized # of Visits):  Erick Brandt Physician: Dr. Emeka Glover MD visit: none schedul regression of condition. Patient/Family/Caregiver was advised of these findings, precautions, and treatment options and has agreed to actively participate in planning and for this course of care.     Thank you for your referral. If you have any questions

## 2020-04-06 DIAGNOSIS — Z94.0 RENAL TRANSPLANT RECIPIENT: ICD-10-CM

## 2020-04-06 DIAGNOSIS — N18.30 CKD (CHRONIC KIDNEY DISEASE) STAGE 3, GFR 30-59 ML/MIN (HCC): ICD-10-CM

## 2020-04-06 RX ORDER — ATORVASTATIN CALCIUM 20 MG/1
20 TABLET, FILM COATED ORAL DAILY
Qty: 90 TABLET | Refills: 1 | Status: SHIPPED | OUTPATIENT
Start: 2020-04-06 | End: 2020-09-10

## 2020-04-06 RX ORDER — LOSARTAN POTASSIUM 50 MG/1
50 TABLET ORAL DAILY
Qty: 90 TABLET | Refills: 1 | Status: SHIPPED | OUTPATIENT
Start: 2020-04-06 | End: 2020-09-10

## 2020-04-06 RX ORDER — MYCOPHENOLATE MOFETIL 500 MG/1
1000 TABLET ORAL 2 TIMES DAILY
Qty: 360 TABLET | Refills: 1 | Status: SHIPPED | OUTPATIENT
Start: 2020-04-06 | End: 2020-09-10

## 2020-04-06 RX ORDER — PREDNISONE 1 MG/1
5 TABLET ORAL DAILY
Qty: 90 TABLET | Refills: 1 | Status: SHIPPED | OUTPATIENT
Start: 2020-04-06 | End: 2020-09-10

## 2020-04-06 RX ORDER — CYCLOSPORINE 25 MG/1
CAPSULE, GELATIN COATED ORAL
Qty: 450 CAPSULE | Refills: 1 | Status: SHIPPED | OUTPATIENT
Start: 2020-04-06 | End: 2020-09-10

## 2020-07-14 ENCOUNTER — ORDER TRANSCRIPTION (OUTPATIENT)
Dept: ADMINISTRATIVE | Facility: HOSPITAL | Age: 60
End: 2020-07-14

## 2020-07-14 DIAGNOSIS — Z12.31 VISIT FOR SCREENING MAMMOGRAM: Primary | ICD-10-CM

## 2020-07-20 ENCOUNTER — LABORATORY ENCOUNTER (OUTPATIENT)
Dept: LAB | Age: 60
End: 2020-07-20
Attending: FAMILY MEDICINE
Payer: COMMERCIAL

## 2020-07-20 DIAGNOSIS — Z94.0 RENAL TRANSPLANT RECIPIENT: ICD-10-CM

## 2020-07-20 DIAGNOSIS — I10 ESSENTIAL HYPERTENSION: ICD-10-CM

## 2020-07-20 DIAGNOSIS — E11.22 DIABETES MELLITUS WITH STAGE 3 CHRONIC KIDNEY DISEASE (HCC): Primary | ICD-10-CM

## 2020-07-20 DIAGNOSIS — N18.30 CKD (CHRONIC KIDNEY DISEASE) STAGE 3, GFR 30-59 ML/MIN (HCC): ICD-10-CM

## 2020-07-20 DIAGNOSIS — E78.2 MIXED HYPERLIPIDEMIA: ICD-10-CM

## 2020-07-20 DIAGNOSIS — N18.30 DIABETES MELLITUS WITH STAGE 3 CHRONIC KIDNEY DISEASE (HCC): Primary | ICD-10-CM

## 2020-07-20 LAB
ALBUMIN SERPL-MCNC: 3.5 G/DL (ref 3.4–5)
ALBUMIN/GLOB SERPL: 0.9 {RATIO} (ref 1–2)
ALP LIVER SERPL-CCNC: 91 U/L (ref 50–130)
ALT SERPL-CCNC: 17 U/L (ref 13–56)
ANION GAP SERPL CALC-SCNC: 4 MMOL/L (ref 0–18)
AST SERPL-CCNC: 18 U/L (ref 15–37)
BASOPHILS # BLD AUTO: 0.07 X10(3) UL (ref 0–0.2)
BASOPHILS NFR BLD AUTO: 0.9 %
BILIRUB SERPL-MCNC: 0.7 MG/DL (ref 0.1–2)
BILIRUB UR QL STRIP.AUTO: NEGATIVE
BUN BLD-MCNC: 25 MG/DL (ref 7–18)
BUN/CREAT SERPL: 15.3 (ref 10–20)
CALCIUM BLD-MCNC: 9.7 MG/DL (ref 8.5–10.1)
CHLORIDE SERPL-SCNC: 107 MMOL/L (ref 98–112)
CHOLEST SMN-MCNC: 163 MG/DL (ref ?–200)
CO2 SERPL-SCNC: 28 MMOL/L (ref 21–32)
COLOR UR AUTO: YELLOW
CREAT BLD-MCNC: 1.63 MG/DL (ref 0.55–1.02)
CREAT UR-SCNC: 159 MG/DL
DEPRECATED RDW RBC AUTO: 42.7 FL (ref 35.1–46.3)
EOSINOPHIL # BLD AUTO: 0.14 X10(3) UL (ref 0–0.7)
EOSINOPHIL NFR BLD AUTO: 1.8 %
ERYTHROCYTE [DISTWIDTH] IN BLOOD BY AUTOMATED COUNT: 12.4 % (ref 11–15)
EST. AVERAGE GLUCOSE BLD GHB EST-MCNC: 131 MG/DL (ref 68–126)
GLOBULIN PLAS-MCNC: 4 G/DL (ref 2.8–4.4)
GLUCOSE BLD-MCNC: 91 MG/DL (ref 70–99)
GLUCOSE UR STRIP.AUTO-MCNC: NEGATIVE MG/DL
HBA1C MFR BLD HPLC: 6.2 % (ref ?–5.7)
HCT VFR BLD AUTO: 38.2 % (ref 35–48)
HDLC SERPL-MCNC: 57 MG/DL (ref 40–59)
HGB BLD-MCNC: 11.9 G/DL (ref 12–16)
IMM GRANULOCYTES # BLD AUTO: 0.01 X10(3) UL (ref 0–1)
IMM GRANULOCYTES NFR BLD: 0.1 %
KETONES UR STRIP.AUTO-MCNC: NEGATIVE MG/DL
LDLC SERPL CALC-MCNC: 76 MG/DL (ref ?–100)
LYMPHOCYTES # BLD AUTO: 2.66 X10(3) UL (ref 1–4)
LYMPHOCYTES NFR BLD AUTO: 34.2 %
M PROTEIN MFR SERPL ELPH: 7.5 G/DL (ref 6.4–8.2)
MCH RBC QN AUTO: 29.5 PG (ref 26–34)
MCHC RBC AUTO-ENTMCNC: 31.2 G/DL (ref 31–37)
MCV RBC AUTO: 94.8 FL (ref 80–100)
MICROALBUMIN UR-MCNC: 2.76 MG/DL
MICROALBUMIN/CREAT 24H UR-RTO: 17.4 UG/MG (ref ?–30)
MONOCYTES # BLD AUTO: 0.71 X10(3) UL (ref 0.1–1)
MONOCYTES NFR BLD AUTO: 9.1 %
NEUTROPHILS # BLD AUTO: 4.18 X10 (3) UL (ref 1.5–7.7)
NEUTROPHILS # BLD AUTO: 4.18 X10(3) UL (ref 1.5–7.7)
NEUTROPHILS NFR BLD AUTO: 53.9 %
NITRITE UR QL STRIP.AUTO: NEGATIVE
NONHDLC SERPL-MCNC: 106 MG/DL (ref ?–130)
OSMOLALITY SERPL CALC.SUM OF ELEC: 292 MOSM/KG (ref 275–295)
PATIENT FASTING Y/N/NP: YES
PATIENT FASTING Y/N/NP: YES
PH UR STRIP.AUTO: 5 [PH] (ref 4.5–8)
PLATELET # BLD AUTO: 324 10(3)UL (ref 150–450)
POTASSIUM SERPL-SCNC: 3.8 MMOL/L (ref 3.5–5.1)
PROT UR STRIP.AUTO-MCNC: NEGATIVE MG/DL
RBC # BLD AUTO: 4.03 X10(6)UL (ref 3.8–5.3)
SODIUM SERPL-SCNC: 139 MMOL/L (ref 136–145)
SP GR UR STRIP.AUTO: 1.01 (ref 1–1.03)
TRIGL SERPL-MCNC: 150 MG/DL (ref 30–149)
UROBILINOGEN UR STRIP.AUTO-MCNC: <2 MG/DL
VLDLC SERPL CALC-MCNC: 30 MG/DL (ref 0–30)
WBC # BLD AUTO: 7.8 X10(3) UL (ref 4–11)
WBC #/AREA URNS AUTO: >50 /HPF
WBC CLUMPS UR QL AUTO: PRESENT

## 2020-07-20 PROCEDURE — 82043 UR ALBUMIN QUANTITATIVE: CPT | Performed by: INTERNAL MEDICINE

## 2020-07-20 PROCEDURE — 81001 URINALYSIS AUTO W/SCOPE: CPT | Performed by: INTERNAL MEDICINE

## 2020-07-20 PROCEDURE — 85025 COMPLETE CBC W/AUTO DIFF WBC: CPT | Performed by: INTERNAL MEDICINE

## 2020-07-20 PROCEDURE — 36415 COLL VENOUS BLD VENIPUNCTURE: CPT | Performed by: INTERNAL MEDICINE

## 2020-07-20 PROCEDURE — 80053 COMPREHEN METABOLIC PANEL: CPT | Performed by: INTERNAL MEDICINE

## 2020-07-20 PROCEDURE — 87186 SC STD MICRODIL/AGAR DIL: CPT | Performed by: INTERNAL MEDICINE

## 2020-07-20 PROCEDURE — 80061 LIPID PANEL: CPT | Performed by: INTERNAL MEDICINE

## 2020-07-20 PROCEDURE — 87077 CULTURE AEROBIC IDENTIFY: CPT | Performed by: INTERNAL MEDICINE

## 2020-07-20 PROCEDURE — 82570 ASSAY OF URINE CREATININE: CPT | Performed by: INTERNAL MEDICINE

## 2020-07-20 PROCEDURE — 87086 URINE CULTURE/COLONY COUNT: CPT | Performed by: INTERNAL MEDICINE

## 2020-07-21 ENCOUNTER — HOSPITAL ENCOUNTER (OUTPATIENT)
Dept: MAMMOGRAPHY | Facility: HOSPITAL | Age: 60
Discharge: HOME | End: 2020-07-21
Attending: OBSTETRICS & GYNECOLOGY
Payer: COMMERCIAL

## 2020-07-21 PROCEDURE — 77067 SCR MAMMO BI INCL CAD: CPT | Performed by: OBSTETRICS & GYNECOLOGY

## 2020-07-21 PROCEDURE — 77063 BREAST TOMOSYNTHESIS BI: CPT | Performed by: OBSTETRICS & GYNECOLOGY

## 2020-07-22 ENCOUNTER — TELEPHONE (OUTPATIENT)
Dept: NEPHROLOGY | Facility: CLINIC | Age: 60
End: 2020-07-22

## 2020-07-22 RX ORDER — SULFAMETHOXAZOLE AND TRIMETHOPRIM 800; 160 MG/1; MG/1
1 TABLET ORAL 2 TIMES DAILY
Qty: 6 TABLET | Refills: 0 | Status: SHIPPED | OUTPATIENT
Start: 2020-07-22 | End: 2020-07-25

## 2020-07-22 NOTE — TELEPHONE ENCOUNTER
I spoke with pt this afternoon, she is asymptomatic but urine cx with > 100 K GNR.   Will give TMP/SMX DS one bid for 3 days

## 2020-08-25 ENCOUNTER — HOSPITAL ENCOUNTER (OUTPATIENT)
Dept: BONE DENSITY | Age: 60
Discharge: HOME | End: 2020-08-25
Attending: OBSTETRICS & GYNECOLOGY
Payer: COMMERCIAL

## 2020-08-25 PROCEDURE — 77080 DXA BONE DENSITY AXIAL: CPT | Performed by: OBSTETRICS & GYNECOLOGY

## 2020-09-10 DIAGNOSIS — Z94.0 RENAL TRANSPLANT RECIPIENT: ICD-10-CM

## 2020-09-10 DIAGNOSIS — N18.30 CKD (CHRONIC KIDNEY DISEASE) STAGE 3, GFR 30-59 ML/MIN (HCC): ICD-10-CM

## 2020-09-10 RX ORDER — PREDNISONE 1 MG/1
5 TABLET ORAL DAILY
Qty: 90 TABLET | Refills: 0 | Status: SHIPPED | OUTPATIENT
Start: 2020-09-10 | End: 2020-12-11

## 2020-09-10 RX ORDER — CYCLOSPORINE 25 MG/1
CAPSULE, GELATIN COATED ORAL
Qty: 450 CAPSULE | Refills: 0 | Status: SHIPPED | OUTPATIENT
Start: 2020-09-10 | End: 2020-12-11

## 2020-09-10 RX ORDER — ATORVASTATIN CALCIUM 20 MG/1
20 TABLET, FILM COATED ORAL DAILY
Qty: 90 TABLET | Refills: 0 | Status: SHIPPED | OUTPATIENT
Start: 2020-09-10 | End: 2020-12-11

## 2020-09-10 RX ORDER — MYCOPHENOLATE MOFETIL 500 MG/1
1000 TABLET ORAL 2 TIMES DAILY
Qty: 360 TABLET | Refills: 0 | Status: SHIPPED | OUTPATIENT
Start: 2020-09-10 | End: 2020-12-11

## 2020-09-10 RX ORDER — LOSARTAN POTASSIUM 50 MG/1
50 TABLET ORAL DAILY
Qty: 90 TABLET | Refills: 0 | Status: SHIPPED | OUTPATIENT
Start: 2020-09-10 | End: 2020-12-11

## 2020-11-18 ENCOUNTER — TELEPHONE (OUTPATIENT)
Dept: FAMILY MEDICINE CLINIC | Facility: CLINIC | Age: 60
End: 2020-11-18

## 2020-11-18 NOTE — TELEPHONE ENCOUNTER
Pt called she went to [address] [place] [address] eye doctor yesterday for her yearly exam and pictures. She states they advised her to have our office call to request the records from that appointment. [address] [place] [address] phone # is [phone].

## 2020-12-11 DIAGNOSIS — Z94.0 RENAL TRANSPLANT RECIPIENT: ICD-10-CM

## 2020-12-11 DIAGNOSIS — N18.30 CKD (CHRONIC KIDNEY DISEASE) STAGE 3, GFR 30-59 ML/MIN (HCC): ICD-10-CM

## 2020-12-14 RX ORDER — LOSARTAN POTASSIUM 50 MG/1
50 TABLET ORAL DAILY
Qty: 90 TABLET | Refills: 0 | Status: SHIPPED | OUTPATIENT
Start: 2020-12-14 | End: 2021-04-09

## 2020-12-14 RX ORDER — ATORVASTATIN CALCIUM 20 MG/1
20 TABLET, FILM COATED ORAL DAILY
Qty: 90 TABLET | Refills: 0 | Status: SHIPPED | OUTPATIENT
Start: 2020-12-14 | End: 2021-04-09

## 2020-12-14 RX ORDER — MYCOPHENOLATE MOFETIL 500 MG/1
1000 TABLET ORAL 2 TIMES DAILY
Qty: 360 TABLET | Refills: 0 | Status: SHIPPED | OUTPATIENT
Start: 2020-12-14 | End: 2020-12-21

## 2020-12-14 RX ORDER — CYCLOSPORINE 25 MG/1
CAPSULE, GELATIN COATED ORAL
Qty: 450 CAPSULE | Refills: 0 | Status: SHIPPED | OUTPATIENT
Start: 2020-12-14 | End: 2021-04-09

## 2020-12-14 RX ORDER — PREDNISONE 1 MG/1
5 TABLET ORAL DAILY
Qty: 90 TABLET | Refills: 0 | Status: SHIPPED | OUTPATIENT
Start: 2020-12-14 | End: 2021-04-09

## 2020-12-14 NOTE — TELEPHONE ENCOUNTER
LOV 01/16/2020    Labs 07/20/2020    Next OV   Your Appointments    Wednesday December 23, 2020  4:00 PM  Exam - Established with Ziggy Ricks MD  [facility] ([address] [facility]) [address] [place] [address], [address] [place] [address]

## 2020-12-15 DIAGNOSIS — Z94.0 STATUS POST KIDNEY TRANSPLANT: Primary | ICD-10-CM

## 2020-12-17 ENCOUNTER — LAB ENCOUNTER (OUTPATIENT)
Dept: LAB | Age: 60
End: 2020-12-17
Attending: FAMILY MEDICINE
Payer: COMMERCIAL

## 2020-12-17 DIAGNOSIS — Z94.0 STATUS POST KIDNEY TRANSPLANT: Primary | ICD-10-CM

## 2020-12-17 PROCEDURE — 80158 DRUG ASSAY CYCLOSPORINE: CPT | Performed by: INTERNAL MEDICINE

## 2020-12-21 ENCOUNTER — OFFICE VISIT (OUTPATIENT)
Dept: NEPHROLOGY | Facility: CLINIC | Age: 60
End: 2020-12-21
Payer: COMMERCIAL

## 2020-12-21 VITALS — WEIGHT: 194 LBS | SYSTOLIC BLOOD PRESSURE: 120 MMHG | DIASTOLIC BLOOD PRESSURE: 76 MMHG | BODY MASS INDEX: 37 KG/M2

## 2020-12-21 DIAGNOSIS — N18.30 STAGE 3 CHRONIC KIDNEY DISEASE, UNSPECIFIED WHETHER STAGE 3A OR 3B CKD (HCC): Primary | ICD-10-CM

## 2020-12-21 DIAGNOSIS — I10 ESSENTIAL HYPERTENSION: ICD-10-CM

## 2020-12-21 DIAGNOSIS — Z94.0 RENAL TRANSPLANT RECIPIENT: ICD-10-CM

## 2020-12-21 PROCEDURE — 99214 OFFICE O/P EST MOD 30 MIN: CPT | Performed by: INTERNAL MEDICINE

## 2020-12-21 PROCEDURE — 3074F SYST BP LT 130 MM HG: CPT | Performed by: INTERNAL MEDICINE

## 2020-12-21 PROCEDURE — 3078F DIAST BP <80 MM HG: CPT | Performed by: INTERNAL MEDICINE

## 2020-12-21 NOTE — PROGRESS NOTES
Nephrology Progress Note      Conrad Covarrubias is a 56year old female.     HPI:   Patient presents with:  Chronic Kidney Disease  Hypertension  Renal Transplant      Patricia Olivera was seen in the nephrology clinic today in follow-up for management of chronic ki Take 2 tablets (1,000 mg total) by mouth 2 (two) times daily. 360 tablet 0   • Glucose Blood In Vitro Strip 1 each by Other route daily. 90 each 3   • Aspirin-Calcium Carbonate  MG Oral Tab Take 81 mg by mouth.      • Magnesium Oxide 400 (240 Mg) MG O cyanosis or edema.   Neurologic: Alert and oriented, normal affect, cranial nerves grossly intact, moving all extremities  Skin: Warm and dry, no rashes      LABS:     Lab Results   Component Value Date    BUN 28 (H) 12/17/2020    BUNCREA 18.9 12/17/2020 disease stage III-she has had longstanding and fortunately, very stable chronic kidney disease since transplant in 2002. Reacting remains essentially unchanged at close to 1.4 mg/dL or so.   Mainstay of therapy remains ongoing good blood pressure control i

## 2021-03-01 ENCOUNTER — TELEPHONE (OUTPATIENT)
Dept: NEPHROLOGY | Facility: CLINIC | Age: 61
End: 2021-03-01

## 2021-03-01 NOTE — TELEPHONE ENCOUNTER
Pt states she is in [place] for the next couple weeks and they have opened scheduling for the COVID vaccine however, she states she needs a letter stating she should receive it and her eligible conditions. She is requesting the letter be sent via [address]

## 2021-03-01 NOTE — TELEPHONE ENCOUNTER
Mrs. George Quintero is a patient under my care who is s/p renal transplant. She is immunocompromised and should certainly receive the COVID vaccine. Please contact me with any questions or concerns.     Wyatt Langley MD  3/1/2021  3:33 PM

## 2021-03-17 DIAGNOSIS — Z23 NEED FOR VACCINATION: ICD-10-CM

## 2021-04-09 DIAGNOSIS — N18.30 CKD (CHRONIC KIDNEY DISEASE) STAGE 3, GFR 30-59 ML/MIN (HCC): ICD-10-CM

## 2021-04-09 DIAGNOSIS — Z94.0 RENAL TRANSPLANT RECIPIENT: ICD-10-CM

## 2021-04-12 RX ORDER — ATORVASTATIN CALCIUM 20 MG/1
20 TABLET, FILM COATED ORAL DAILY
Qty: 90 TABLET | Refills: 1 | Status: SHIPPED | OUTPATIENT
Start: 2021-04-12 | End: 2021-09-13

## 2021-04-12 RX ORDER — MYCOPHENOLATE MOFETIL 500 MG/1
1000 TABLET ORAL 2 TIMES DAILY
Qty: 360 TABLET | Refills: 1 | Status: SHIPPED | OUTPATIENT
Start: 2021-04-12 | End: 2021-09-13

## 2021-04-12 RX ORDER — LOSARTAN POTASSIUM 50 MG/1
50 TABLET ORAL DAILY
Qty: 90 TABLET | Refills: 1 | Status: SHIPPED | OUTPATIENT
Start: 2021-04-12 | End: 2021-09-13

## 2021-04-12 RX ORDER — CYCLOSPORINE 25 MG/1
CAPSULE, GELATIN COATED ORAL
Qty: 450 CAPSULE | Refills: 1 | Status: SHIPPED | OUTPATIENT
Start: 2021-04-12 | End: 2021-09-13

## 2021-04-12 RX ORDER — PREDNISONE 1 MG/1
5 TABLET ORAL DAILY
Qty: 90 TABLET | Refills: 1 | Status: SHIPPED | OUTPATIENT
Start: 2021-04-12 | End: 2021-09-13

## 2021-04-14 ENCOUNTER — TELEPHONE (OUTPATIENT)
Dept: FAMILY MEDICINE CLINIC | Facility: CLINIC | Age: 61
End: 2021-04-14

## 2021-04-16 ENCOUNTER — TELEPHONE (OUTPATIENT)
Dept: FAMILY MEDICINE CLINIC | Facility: CLINIC | Age: 61
End: 2021-04-16

## 2021-04-16 ENCOUNTER — LABORATORY ENCOUNTER (OUTPATIENT)
Dept: LAB | Age: 61
End: 2021-04-16
Attending: FAMILY MEDICINE
Payer: COMMERCIAL

## 2021-04-16 DIAGNOSIS — Z94.0 RENAL TRANSPLANT RECIPIENT: ICD-10-CM

## 2021-04-16 DIAGNOSIS — E11.9 DIABETES MELLITUS TYPE 2 IN NONOBESE (HCC): Primary | ICD-10-CM

## 2021-04-16 DIAGNOSIS — I10 ESSENTIAL HYPERTENSION: ICD-10-CM

## 2021-04-16 DIAGNOSIS — N18.30 STAGE 3 CHRONIC KIDNEY DISEASE, UNSPECIFIED WHETHER STAGE 3A OR 3B CKD (HCC): ICD-10-CM

## 2021-04-16 DIAGNOSIS — E11.9 TYPE 2 DIABETES MELLITUS WITHOUT COMPLICATION, WITHOUT LONG-TERM CURRENT USE OF INSULIN (HCC): ICD-10-CM

## 2021-04-16 PROCEDURE — 81001 URINALYSIS AUTO W/SCOPE: CPT | Performed by: INTERNAL MEDICINE

## 2021-04-16 PROCEDURE — 82043 UR ALBUMIN QUANTITATIVE: CPT | Performed by: INTERNAL MEDICINE

## 2021-04-16 PROCEDURE — 80061 LIPID PANEL: CPT | Performed by: INTERNAL MEDICINE

## 2021-04-16 PROCEDURE — 85025 COMPLETE CBC W/AUTO DIFF WBC: CPT | Performed by: INTERNAL MEDICINE

## 2021-04-16 PROCEDURE — 80053 COMPREHEN METABOLIC PANEL: CPT | Performed by: INTERNAL MEDICINE

## 2021-04-16 PROCEDURE — 36415 COLL VENOUS BLD VENIPUNCTURE: CPT | Performed by: INTERNAL MEDICINE

## 2021-04-16 PROCEDURE — 82570 ASSAY OF URINE CREATININE: CPT | Performed by: INTERNAL MEDICINE

## 2021-04-16 PROCEDURE — 80158 DRUG ASSAY CYCLOSPORINE: CPT | Performed by: INTERNAL MEDICINE

## 2021-04-16 NOTE — TELEPHONE ENCOUNTER
Pt called, pt would like an order placed for A1C. Pt will get it done at [facility] office. Pt has other labs she is getting done for another dr and thought she would get the A1C done at the same time.   Please call pt at [phone]

## 2021-04-19 NOTE — PROGRESS NOTES
Telephone Information:  Home Phone      [phone]  [facility] regarding Dr. Latasha Ragsdale result note. Hours and number given.

## 2021-04-21 PROBLEM — E11.69 DYSLIPIDEMIA ASSOCIATED WITH TYPE 2 DIABETES MELLITUS (HCC): Status: ACTIVE | Noted: 2021-04-21

## 2021-04-21 PROBLEM — I10 ESSENTIAL HYPERTENSION WITH GOAL BLOOD PRESSURE LESS THAN 140/90: Status: ACTIVE | Noted: 2017-04-06

## 2021-04-21 PROBLEM — E78.5 DYSLIPIDEMIA ASSOCIATED WITH TYPE 2 DIABETES MELLITUS (HCC): Status: ACTIVE | Noted: 2021-04-21

## 2021-04-21 PROBLEM — E78.5 DYSLIPIDEMIA ASSOCIATED WITH TYPE 2 DIABETES MELLITUS: Status: ACTIVE | Noted: 2021-04-21

## 2021-04-21 PROBLEM — E11.69 DYSLIPIDEMIA ASSOCIATED WITH TYPE 2 DIABETES MELLITUS  (HCC): Status: ACTIVE | Noted: 2021-04-21

## 2021-04-21 PROBLEM — E78.5 DYSLIPIDEMIA ASSOCIATED WITH TYPE 2 DIABETES MELLITUS  (HCC): Status: ACTIVE | Noted: 2021-04-21

## 2021-04-21 PROBLEM — E11.9 TYPE 2 DIABETES MELLITUS WITHOUT COMPLICATION, WITHOUT LONG-TERM CURRENT USE OF INSULIN (HCC): Status: ACTIVE | Noted: 2021-04-21

## 2021-04-21 PROBLEM — E11.69 DYSLIPIDEMIA ASSOCIATED WITH TYPE 2 DIABETES MELLITUS: Status: ACTIVE | Noted: 2021-04-21

## 2021-07-14 ENCOUNTER — LAB ENCOUNTER (OUTPATIENT)
Dept: LAB | Age: 61
End: 2021-07-14
Attending: INTERNAL MEDICINE
Payer: COMMERCIAL

## 2021-07-14 DIAGNOSIS — Z94.0 RENAL TRANSPLANT RECIPIENT: ICD-10-CM

## 2021-07-14 DIAGNOSIS — I10 ESSENTIAL HYPERTENSION: ICD-10-CM

## 2021-07-14 DIAGNOSIS — N18.30 STAGE 3 CHRONIC KIDNEY DISEASE, UNSPECIFIED WHETHER STAGE 3A OR 3B CKD (HCC): ICD-10-CM

## 2021-07-14 DIAGNOSIS — Z94.0 RENAL TRANSPLANT RECIPIENT: Primary | ICD-10-CM

## 2021-07-14 LAB
ALBUMIN SERPL-MCNC: 3.4 G/DL (ref 3.4–5)
ALBUMIN/GLOB SERPL: 0.9 {RATIO} (ref 1–2)
ALP LIVER SERPL-CCNC: 95 U/L
ALT SERPL-CCNC: 16 U/L
ANION GAP SERPL CALC-SCNC: 6 MMOL/L (ref 0–18)
AST SERPL-CCNC: 11 U/L (ref 15–37)
BASOPHILS # BLD AUTO: 0.07 X10(3) UL (ref 0–0.2)
BASOPHILS NFR BLD AUTO: 1 %
BILIRUB SERPL-MCNC: 0.6 MG/DL (ref 0.1–2)
BILIRUB UR QL STRIP.AUTO: NEGATIVE
BUN BLD-MCNC: 28 MG/DL (ref 7–18)
BUN/CREAT SERPL: 20.4 (ref 10–20)
CALCIUM BLD-MCNC: 9.6 MG/DL (ref 8.5–10.1)
CHLORIDE SERPL-SCNC: 113 MMOL/L (ref 98–112)
CHOLEST SMN-MCNC: 163 MG/DL (ref ?–200)
CO2 SERPL-SCNC: 24 MMOL/L (ref 21–32)
COLOR UR AUTO: YELLOW
CREAT BLD-MCNC: 1.37 MG/DL
CREAT UR-SCNC: 72.6 MG/DL
DEPRECATED RDW RBC AUTO: 46.5 FL (ref 35.1–46.3)
EOSINOPHIL # BLD AUTO: 0.17 X10(3) UL (ref 0–0.7)
EOSINOPHIL NFR BLD AUTO: 2.4 %
ERYTHROCYTE [DISTWIDTH] IN BLOOD BY AUTOMATED COUNT: 13.2 % (ref 11–15)
GLOBULIN PLAS-MCNC: 3.6 G/DL (ref 2.8–4.4)
GLUCOSE BLD-MCNC: 87 MG/DL (ref 70–99)
GLUCOSE UR STRIP.AUTO-MCNC: NEGATIVE MG/DL
HCT VFR BLD AUTO: 38.5 %
HDLC SERPL-MCNC: 60 MG/DL (ref 40–59)
HGB BLD-MCNC: 11.9 G/DL
IMM GRANULOCYTES # BLD AUTO: 0.01 X10(3) UL (ref 0–1)
IMM GRANULOCYTES NFR BLD: 0.1 %
KETONES UR STRIP.AUTO-MCNC: NEGATIVE MG/DL
LDLC SERPL CALC-MCNC: 92 MG/DL (ref ?–100)
LYMPHOCYTES # BLD AUTO: 2.67 X10(3) UL (ref 1–4)
LYMPHOCYTES NFR BLD AUTO: 37.4 %
M PROTEIN MFR SERPL ELPH: 7 G/DL (ref 6.4–8.2)
MCH RBC QN AUTO: 29.7 PG (ref 26–34)
MCHC RBC AUTO-ENTMCNC: 30.9 G/DL (ref 31–37)
MCV RBC AUTO: 96 FL
MICROALBUMIN UR-MCNC: 1.34 MG/DL
MICROALBUMIN/CREAT 24H UR-RTO: 18.5 UG/MG (ref ?–30)
MONOCYTES # BLD AUTO: 0.6 X10(3) UL (ref 0.1–1)
MONOCYTES NFR BLD AUTO: 8.4 %
NEUTROPHILS # BLD AUTO: 3.61 X10 (3) UL (ref 1.5–7.7)
NEUTROPHILS # BLD AUTO: 3.61 X10(3) UL (ref 1.5–7.7)
NEUTROPHILS NFR BLD AUTO: 50.7 %
NITRITE UR QL STRIP.AUTO: POSITIVE
NONHDLC SERPL-MCNC: 103 MG/DL (ref ?–130)
OSMOLALITY SERPL CALC.SUM OF ELEC: 301 MOSM/KG (ref 275–295)
PATIENT FASTING Y/N/NP: YES
PATIENT FASTING Y/N/NP: YES
PH UR STRIP.AUTO: 5 [PH] (ref 5–8)
PLATELET # BLD AUTO: 247 10(3)UL (ref 150–450)
POTASSIUM SERPL-SCNC: 4.3 MMOL/L (ref 3.5–5.1)
PROT UR STRIP.AUTO-MCNC: NEGATIVE MG/DL
RBC # BLD AUTO: 4.01 X10(6)UL
RBC UR QL AUTO: NEGATIVE
SODIUM SERPL-SCNC: 143 MMOL/L (ref 136–145)
SP GR UR STRIP.AUTO: 1.01 (ref 1–1.03)
TRIGL SERPL-MCNC: 57 MG/DL (ref 30–149)
UROBILINOGEN UR STRIP.AUTO-MCNC: <2 MG/DL
VLDLC SERPL CALC-MCNC: 9 MG/DL (ref 0–30)
WBC # BLD AUTO: 7.1 X10(3) UL (ref 4–11)

## 2021-07-14 PROCEDURE — 82043 UR ALBUMIN QUANTITATIVE: CPT | Performed by: INTERNAL MEDICINE

## 2021-07-14 PROCEDURE — 81001 URINALYSIS AUTO W/SCOPE: CPT | Performed by: INTERNAL MEDICINE

## 2021-07-14 PROCEDURE — 80158 DRUG ASSAY CYCLOSPORINE: CPT | Performed by: INTERNAL MEDICINE

## 2021-07-14 PROCEDURE — 82570 ASSAY OF URINE CREATININE: CPT | Performed by: INTERNAL MEDICINE

## 2021-07-14 PROCEDURE — 85025 COMPLETE CBC W/AUTO DIFF WBC: CPT | Performed by: INTERNAL MEDICINE

## 2021-07-14 PROCEDURE — 80053 COMPREHEN METABOLIC PANEL: CPT | Performed by: INTERNAL MEDICINE

## 2021-07-14 PROCEDURE — 80061 LIPID PANEL: CPT | Performed by: INTERNAL MEDICINE

## 2021-07-17 LAB — CYCLOSPORINE A: 94.5 NG/ML

## 2021-07-23 NOTE — Clinical Note
Daily Note     Today's date: 2021  Patient name: Stevo Germain  : 1955  MRN: [id number]  Referring provider: Portillo Aguirre DPM  Dx:   Encounter Diagnosis     ICD-10-CM    1  Recurrent pain of both knees  M25 561     M25 562    2  Right ankle pain, unspecified chronicity  M25 571    3  Left ankle pain, unspecified chronicity  M25 572    4  Weakness  R53 1    5  Gait abnormality  R26 9                   Subjective: Patient notes that she is having an eye surgery on Monday  She notes that she still has too much pain  She notes that she took her water pill so her ankles are less swollen  Objective: See treatment diary below      Assessment: Tolerated treatment well  Patient would benefit from continued PT  She demonstrated improved gait with SPC in LUE but required cues for correct technique  She continues to hyperfixated on pain and continues to request passive modalities over active treatments  She was able to perform heel slides on FR and isometric PF into ball this date with increased knee and ankle mobility evident  She does continue to ambulate with circumduction and trendelenburg compensation  She was able to perform "marching" gait at HR with SPC with encouragement for increased hip and knee flexion  She tolerated lateral side steps at table but required cues to avoid rotation compensation  Plan: Continue per plan of care  Hold on PT pending cataract surgery restrictions        Diagnosis: b/l LE strength deficits, gait dysfunction  Precautions: Language barrier      Manuals 7/1 7/8 7/15 7/23         Gentle ankle, knee ROM  RS KS                       Gentle roller stick to lateral/anterior R thigh/calf    RS                      Neuro Re-Ed                          Glute sets  20x 20x           Quad sets  20x 20x          Isometric PF into purple ball   20x ea 20x                                                Ther Ex             bike  5'  pendulums def D/c         Heel slides   20xea FYI, TCM template completed. TCM appt on 7/25/24 seated with FR  5"x20ea         Ankle pumps  20xea 20xea          LAQ  20xea 20xea          Hip abduction on stool  20xea 20xea                       Chair flexion  5"x20    Bal 5"x20 5"x20    Ball 5"x20 Ball 5"x20         Seated marches   20xea          Seated hip adduction    purple ball   5"x20                                   Ther Activity             Sit to stands             Lateral side steps    5x at table         Gait Training             SPC in LUE lobby to doorway seated rest and back    1x         SPC and HR with knee flexion/hip flexion    10x at [facility]          Modalities

## 2021-08-26 ENCOUNTER — HOSPITAL ENCOUNTER (OUTPATIENT)
Dept: MAMMOGRAPHY | Facility: HOSPITAL | Age: 61
Discharge: HOME | End: 2021-08-26
Attending: ADVANCED PRACTICE MIDWIFE
Payer: COMMERCIAL

## 2021-08-26 PROCEDURE — 77067 SCR MAMMO BI INCL CAD: CPT | Performed by: ADVANCED PRACTICE MIDWIFE

## 2021-08-26 PROCEDURE — 77063 BREAST TOMOSYNTHESIS BI: CPT | Performed by: ADVANCED PRACTICE MIDWIFE

## 2021-09-13 DIAGNOSIS — Z94.0 RENAL TRANSPLANT RECIPIENT: ICD-10-CM

## 2021-09-13 DIAGNOSIS — N18.30 CKD (CHRONIC KIDNEY DISEASE) STAGE 3, GFR 30-59 ML/MIN (HCC): ICD-10-CM

## 2021-09-13 RX ORDER — MYCOPHENOLATE MOFETIL 500 MG/1
1000 TABLET ORAL 2 TIMES DAILY
Qty: 360 TABLET | Refills: 0 | Status: SHIPPED | OUTPATIENT
Start: 2021-09-13 | End: 2021-12-08

## 2021-09-13 RX ORDER — PREDNISONE 1 MG/1
5 TABLET ORAL DAILY
Qty: 90 TABLET | Refills: 0 | Status: SHIPPED | OUTPATIENT
Start: 2021-09-13 | End: 2021-12-08

## 2021-09-13 RX ORDER — CYCLOSPORINE 25 MG/1
CAPSULE, GELATIN COATED ORAL
Qty: 450 CAPSULE | Refills: 0 | Status: SHIPPED | OUTPATIENT
Start: 2021-09-13 | End: 2021-12-08

## 2021-09-13 RX ORDER — LOSARTAN POTASSIUM 50 MG/1
50 TABLET ORAL DAILY
Qty: 90 TABLET | Refills: 0 | Status: SHIPPED | OUTPATIENT
Start: 2021-09-13 | End: 2021-12-08

## 2021-09-13 RX ORDER — ATORVASTATIN CALCIUM 20 MG/1
20 TABLET, FILM COATED ORAL DAILY
Qty: 90 TABLET | Refills: 0 | Status: SHIPPED | OUTPATIENT
Start: 2021-09-13 | End: 2021-12-08

## 2021-09-27 ENCOUNTER — TELEPHONE (OUTPATIENT)
Dept: NEPHROLOGY | Facility: CLINIC | Age: 61
End: 2021-09-27

## 2021-09-27 DIAGNOSIS — Z94.0 RENAL TRANSPLANT RECIPIENT: ICD-10-CM

## 2021-09-27 DIAGNOSIS — I10 ESSENTIAL HYPERTENSION: ICD-10-CM

## 2021-09-27 DIAGNOSIS — N18.30 STAGE 3 CHRONIC KIDNEY DISEASE, UNSPECIFIED WHETHER STAGE 3A OR 3B CKD (HCC): Primary | ICD-10-CM

## 2021-09-27 NOTE — TELEPHONE ENCOUNTER
Patient is calling to ask Dr. Elida Malcolm to place blood work order. Cyclosporine, Blood test needs to be added as well.

## 2021-10-06 ENCOUNTER — LABORATORY ENCOUNTER (OUTPATIENT)
Dept: LAB | Age: 61
End: 2021-10-06
Attending: INTERNAL MEDICINE
Payer: COMMERCIAL

## 2021-10-06 DIAGNOSIS — N18.30 STAGE 3 CHRONIC KIDNEY DISEASE, UNSPECIFIED WHETHER STAGE 3A OR 3B CKD (HCC): ICD-10-CM

## 2021-10-06 DIAGNOSIS — E11.9 TYPE 2 DIABETES MELLITUS WITHOUT COMPLICATION, WITHOUT LONG-TERM CURRENT USE OF INSULIN (HCC): ICD-10-CM

## 2021-10-06 DIAGNOSIS — Z94.0 HISTORY OF RENAL TRANSPLANTATION: ICD-10-CM

## 2021-10-06 DIAGNOSIS — Z94.0 RENAL TRANSPLANT RECIPIENT: ICD-10-CM

## 2021-10-06 DIAGNOSIS — E11.69 DYSLIPIDEMIA ASSOCIATED WITH TYPE 2 DIABETES MELLITUS (HCC): ICD-10-CM

## 2021-10-06 DIAGNOSIS — E78.5 DYSLIPIDEMIA ASSOCIATED WITH TYPE 2 DIABETES MELLITUS (HCC): ICD-10-CM

## 2021-10-06 DIAGNOSIS — I10 ESSENTIAL HYPERTENSION: ICD-10-CM

## 2021-10-06 DIAGNOSIS — I10 ESSENTIAL HYPERTENSION WITH GOAL BLOOD PRESSURE LESS THAN 140/90: ICD-10-CM

## 2021-10-06 PROCEDURE — 80053 COMPREHEN METABOLIC PANEL: CPT | Performed by: INTERNAL MEDICINE

## 2021-10-06 PROCEDURE — 82570 ASSAY OF URINE CREATININE: CPT | Performed by: INTERNAL MEDICINE

## 2021-10-06 PROCEDURE — 82043 UR ALBUMIN QUANTITATIVE: CPT | Performed by: INTERNAL MEDICINE

## 2021-10-06 PROCEDURE — 80158 DRUG ASSAY CYCLOSPORINE: CPT | Performed by: INTERNAL MEDICINE

## 2021-10-06 PROCEDURE — 80061 LIPID PANEL: CPT | Performed by: INTERNAL MEDICINE

## 2021-10-06 PROCEDURE — 85025 COMPLETE CBC W/AUTO DIFF WBC: CPT | Performed by: INTERNAL MEDICINE

## 2021-10-06 PROCEDURE — 3044F HG A1C LEVEL LT 7.0%: CPT | Performed by: INTERNAL MEDICINE

## 2021-10-06 PROCEDURE — 81001 URINALYSIS AUTO W/SCOPE: CPT | Performed by: INTERNAL MEDICINE

## 2021-11-30 DIAGNOSIS — Z94.0 STATUS POST KIDNEY TRANSPLANT: Primary | ICD-10-CM

## 2021-12-06 ENCOUNTER — LAB ENCOUNTER (OUTPATIENT)
Dept: LAB | Age: 61
End: 2021-12-06
Attending: FAMILY MEDICINE
Payer: COMMERCIAL

## 2021-12-06 DIAGNOSIS — Z94.0 STATUS POST KIDNEY TRANSPLANT: ICD-10-CM

## 2021-12-06 PROCEDURE — 81001 URINALYSIS AUTO W/SCOPE: CPT | Performed by: INTERNAL MEDICINE

## 2021-12-06 PROCEDURE — 3061F NEG MICROALBUMINURIA REV: CPT | Performed by: INTERNAL MEDICINE

## 2021-12-06 PROCEDURE — 82570 ASSAY OF URINE CREATININE: CPT | Performed by: INTERNAL MEDICINE

## 2021-12-06 PROCEDURE — 82043 UR ALBUMIN QUANTITATIVE: CPT | Performed by: INTERNAL MEDICINE

## 2021-12-06 PROCEDURE — 85025 COMPLETE CBC W/AUTO DIFF WBC: CPT | Performed by: INTERNAL MEDICINE

## 2021-12-06 PROCEDURE — 80053 COMPREHEN METABOLIC PANEL: CPT | Performed by: INTERNAL MEDICINE

## 2021-12-06 PROCEDURE — 80158 DRUG ASSAY CYCLOSPORINE: CPT | Performed by: FAMILY MEDICINE

## 2021-12-08 ENCOUNTER — OFFICE VISIT (OUTPATIENT)
Dept: NEPHROLOGY | Facility: CLINIC | Age: 61
End: 2021-12-08
Payer: COMMERCIAL

## 2021-12-08 VITALS — SYSTOLIC BLOOD PRESSURE: 120 MMHG | WEIGHT: 188 LBS | BODY MASS INDEX: 36 KG/M2 | DIASTOLIC BLOOD PRESSURE: 74 MMHG

## 2021-12-08 DIAGNOSIS — N18.30 CKD (CHRONIC KIDNEY DISEASE) STAGE 3, GFR 30-59 ML/MIN (HCC): ICD-10-CM

## 2021-12-08 DIAGNOSIS — I10 ESSENTIAL HYPERTENSION: ICD-10-CM

## 2021-12-08 DIAGNOSIS — Z94.0 RENAL TRANSPLANT RECIPIENT: ICD-10-CM

## 2021-12-08 DIAGNOSIS — N18.30 STAGE 3 CHRONIC KIDNEY DISEASE, UNSPECIFIED WHETHER STAGE 3A OR 3B CKD (HCC): ICD-10-CM

## 2021-12-08 DIAGNOSIS — Z94.0 RENAL TRANSPLANT RECIPIENT: Primary | ICD-10-CM

## 2021-12-08 PROCEDURE — 3078F DIAST BP <80 MM HG: CPT | Performed by: INTERNAL MEDICINE

## 2021-12-08 PROCEDURE — 99214 OFFICE O/P EST MOD 30 MIN: CPT | Performed by: INTERNAL MEDICINE

## 2021-12-08 PROCEDURE — 3074F SYST BP LT 130 MM HG: CPT | Performed by: INTERNAL MEDICINE

## 2021-12-08 RX ORDER — MYCOPHENOLATE MOFETIL 500 MG/1
1000 TABLET ORAL 2 TIMES DAILY
Qty: 360 TABLET | Refills: 1 | Status: SHIPPED | OUTPATIENT
Start: 2021-12-08 | End: 2021-12-20

## 2021-12-08 RX ORDER — PREDNISONE 1 MG/1
5 TABLET ORAL DAILY
Qty: 90 TABLET | Refills: 1 | Status: SHIPPED | OUTPATIENT
Start: 2021-12-08 | End: 2021-12-20

## 2021-12-08 RX ORDER — LOSARTAN POTASSIUM 50 MG/1
50 TABLET ORAL DAILY
Qty: 90 TABLET | Refills: 1 | Status: SHIPPED | OUTPATIENT
Start: 2021-12-08 | End: 2021-12-20

## 2021-12-08 RX ORDER — CYCLOSPORINE 25 MG/1
CAPSULE, GELATIN COATED ORAL
Qty: 450 CAPSULE | Refills: 1 | Status: SHIPPED | OUTPATIENT
Start: 2021-12-08 | End: 2021-12-20

## 2021-12-08 RX ORDER — ATORVASTATIN CALCIUM 20 MG/1
20 TABLET, FILM COATED ORAL DAILY
Qty: 90 TABLET | Refills: 1 | Status: SHIPPED | OUTPATIENT
Start: 2021-12-08 | End: 2021-12-20

## 2021-12-08 NOTE — PROGRESS NOTES
Nephrology Progress Note      Allen Buenrostro is a 59year old female.     HPI:   Patient presents with:  Chronic Kidney Disease  Hypertension  Renal Transplant      Keshia Le was seen in nephrology clinic today follow-up for management of chronic kidney di cycloSPORINE non-modified (SANDIMMUNE) 25 MG Oral Cap Take 75 mg by mouth every morning and 50 mg by mouth every evening 450 capsule 0   • Glucose Blood In Vitro Strip 1 each by Other route daily.  90 each 3   • Aspirin-Calcium Carbonate  MG Oral Tab normal affect, cranial nerves grossly intact, moving all extremities  Skin: Warm and dry, no rashes      LABS:     Lab Results   Component Value Date    BUN 23 (H) 12/06/2021    BUNCREA 20.4 (H) 07/14/2021    CREATSERUM 1.36 (H) 12/06/2021    ANIONGAP 5 12 longstanding and is fortunately quite stable overall since her transplant in 2002. Creatinine has generally been 1.2 to 1.4 mg deciliter so and she remains in this range.   The mainstay of therapy continues remain ongoing good blood pressure control includ

## 2021-12-09 ENCOUNTER — TELEPHONE (OUTPATIENT)
Dept: FAMILY MEDICINE CLINIC | Facility: CLINIC | Age: 61
End: 2021-12-09

## 2021-12-09 NOTE — TELEPHONE ENCOUNTER
----- Message from Evelio Erickson DO sent at 12/9/2021  9:48 AM CST -----  Please forward to her transplant/nephrologisy team

## 2021-12-20 DIAGNOSIS — Z94.0 RENAL TRANSPLANT RECIPIENT: ICD-10-CM

## 2021-12-20 DIAGNOSIS — N18.30 CKD (CHRONIC KIDNEY DISEASE) STAGE 3, GFR 30-59 ML/MIN (HCC): ICD-10-CM

## 2021-12-20 RX ORDER — PREDNISONE 1 MG/1
5 TABLET ORAL DAILY
Qty: 90 TABLET | Refills: 1 | Status: SHIPPED | OUTPATIENT
Start: 2021-12-20

## 2021-12-20 RX ORDER — LOSARTAN POTASSIUM 50 MG/1
50 TABLET ORAL DAILY
Qty: 90 TABLET | Refills: 1 | Status: SHIPPED | OUTPATIENT
Start: 2021-12-20

## 2021-12-20 RX ORDER — CYCLOSPORINE 25 MG/1
CAPSULE, GELATIN COATED ORAL
Qty: 450 CAPSULE | Refills: 1 | Status: SHIPPED | OUTPATIENT
Start: 2021-12-20

## 2021-12-20 RX ORDER — ATORVASTATIN CALCIUM 20 MG/1
20 TABLET, FILM COATED ORAL DAILY
Qty: 90 TABLET | Refills: 1 | Status: SHIPPED | OUTPATIENT
Start: 2021-12-20

## 2021-12-20 RX ORDER — MYCOPHENOLATE MOFETIL 500 MG/1
1000 TABLET ORAL 2 TIMES DAILY
Qty: 360 TABLET | Refills: 1 | Status: SHIPPED | OUTPATIENT
Start: 2021-12-20

## 2022-01-04 ENCOUNTER — TELEPHONE (OUTPATIENT)
Dept: FAMILY MEDICINE CLINIC | Facility: CLINIC | Age: 62
End: 2022-01-04

## 2022-01-04 NOTE — TELEPHONE ENCOUNTER
The patient states she had a retinal eye photo taken at [place] [facility] [address] in [place] on 11/23/21. The phone number is [phone].

## 2022-01-06 ENCOUNTER — MED REC SCAN ONLY (OUTPATIENT)
Dept: FAMILY MEDICINE CLINIC | Facility: CLINIC | Age: 62
End: 2022-01-06

## 2022-04-15 ENCOUNTER — LABORATORY ENCOUNTER (OUTPATIENT)
Dept: LAB | Age: 62
End: 2022-04-15
Attending: INTERNAL MEDICINE
Payer: COMMERCIAL

## 2022-04-15 DIAGNOSIS — I10 ESSENTIAL HYPERTENSION: ICD-10-CM

## 2022-04-15 DIAGNOSIS — Z94.0 RENAL TRANSPLANT RECIPIENT: ICD-10-CM

## 2022-04-15 DIAGNOSIS — E11.9 TYPE 2 DIABETES MELLITUS WITHOUT COMPLICATION, WITHOUT LONG-TERM CURRENT USE OF INSULIN (HCC): Primary | ICD-10-CM

## 2022-04-15 DIAGNOSIS — N18.30 STAGE 3 CHRONIC KIDNEY DISEASE, UNSPECIFIED WHETHER STAGE 3A OR 3B CKD (HCC): ICD-10-CM

## 2022-04-15 LAB
ALBUMIN SERPL-MCNC: 3.5 G/DL (ref 3.4–5)
ALBUMIN/GLOB SERPL: 1.1 {RATIO} (ref 1–2)
ALP LIVER SERPL-CCNC: 97 U/L
ALT SERPL-CCNC: 16 U/L
ANION GAP SERPL CALC-SCNC: 6 MMOL/L (ref 0–18)
AST SERPL-CCNC: 8 U/L (ref 15–37)
BASOPHILS # BLD AUTO: 0.09 X10(3) UL (ref 0–0.2)
BASOPHILS NFR BLD AUTO: 1.3 %
BILIRUB SERPL-MCNC: 0.8 MG/DL (ref 0.1–2)
BILIRUB UR QL STRIP.AUTO: NEGATIVE
BUN BLD-MCNC: 23 MG/DL (ref 7–18)
CALCIUM BLD-MCNC: 9.9 MG/DL (ref 8.5–10.1)
CHLORIDE SERPL-SCNC: 111 MMOL/L (ref 98–112)
CHOLEST SERPL-MCNC: 162 MG/DL (ref ?–200)
CO2 SERPL-SCNC: 26 MMOL/L (ref 21–32)
COLOR UR AUTO: YELLOW
CREAT BLD-MCNC: 1.26 MG/DL
CREAT UR-SCNC: 50.5 MG/DL
EOSINOPHIL # BLD AUTO: 0.13 X10(3) UL (ref 0–0.7)
EOSINOPHIL NFR BLD AUTO: 1.9 %
ERYTHROCYTE [DISTWIDTH] IN BLOOD BY AUTOMATED COUNT: 12.8 %
EST. AVERAGE GLUCOSE BLD GHB EST-MCNC: 137 MG/DL (ref 68–126)
FASTING PATIENT LIPID ANSWER: YES
FASTING STATUS PATIENT QL REPORTED: YES
GLOBULIN PLAS-MCNC: 3.2 G/DL (ref 2.8–4.4)
GLUCOSE BLD-MCNC: 104 MG/DL (ref 70–99)
GLUCOSE UR STRIP.AUTO-MCNC: NEGATIVE MG/DL
HBA1C MFR BLD: 6.4 % (ref ?–5.7)
HCT VFR BLD AUTO: 38.4 %
HDLC SERPL-MCNC: 59 MG/DL (ref 40–59)
HGB BLD-MCNC: 11.9 G/DL
IMM GRANULOCYTES # BLD AUTO: 0.02 X10(3) UL (ref 0–1)
IMM GRANULOCYTES NFR BLD: 0.3 %
KETONES UR STRIP.AUTO-MCNC: NEGATIVE MG/DL
LDLC SERPL CALC-MCNC: 81 MG/DL (ref ?–100)
LYMPHOCYTES # BLD AUTO: 2.42 X10(3) UL (ref 1–4)
LYMPHOCYTES NFR BLD AUTO: 35.4 %
MCH RBC QN AUTO: 29.5 PG (ref 26–34)
MCHC RBC AUTO-ENTMCNC: 31 G/DL (ref 31–37)
MCV RBC AUTO: 95.3 FL
MICROALBUMIN UR-MCNC: 3.14 MG/DL
MICROALBUMIN/CREAT 24H UR-RTO: 62.2 UG/MG (ref ?–30)
MONOCYTES # BLD AUTO: 0.56 X10(3) UL (ref 0.1–1)
MONOCYTES NFR BLD AUTO: 8.2 %
NEUTROPHILS # BLD AUTO: 3.62 X10 (3) UL (ref 1.5–7.7)
NEUTROPHILS # BLD AUTO: 3.62 X10(3) UL (ref 1.5–7.7)
NEUTROPHILS NFR BLD AUTO: 52.9 %
NITRITE UR QL STRIP.AUTO: NEGATIVE
NONHDLC SERPL-MCNC: 103 MG/DL (ref ?–130)
OSMOLALITY SERPL CALC.SUM OF ELEC: 300 MOSM/KG (ref 275–295)
PH UR STRIP.AUTO: 5 [PH] (ref 5–8)
PLATELET # BLD AUTO: 258 10(3)UL (ref 150–450)
POTASSIUM SERPL-SCNC: 4.3 MMOL/L (ref 3.5–5.1)
PROT SERPL-MCNC: 6.7 G/DL (ref 6.4–8.2)
PROT UR STRIP.AUTO-MCNC: NEGATIVE MG/DL
RBC # BLD AUTO: 4.03 X10(6)UL
SODIUM SERPL-SCNC: 143 MMOL/L (ref 136–145)
SP GR UR STRIP.AUTO: 1.01 (ref 1–1.03)
TRIGL SERPL-MCNC: 122 MG/DL (ref 30–149)
UROBILINOGEN UR STRIP.AUTO-MCNC: <2 MG/DL
VLDLC SERPL CALC-MCNC: 19 MG/DL (ref 0–30)
WBC # BLD AUTO: 6.8 X10(3) UL (ref 4–11)
WBC #/AREA URNS AUTO: >50 /HPF
WBC CLUMPS UR QL AUTO: PRESENT /HPF

## 2022-04-15 PROCEDURE — 80053 COMPREHEN METABOLIC PANEL: CPT

## 2022-04-15 PROCEDURE — 36415 COLL VENOUS BLD VENIPUNCTURE: CPT

## 2022-04-15 PROCEDURE — 83036 HEMOGLOBIN GLYCOSYLATED A1C: CPT

## 2022-04-15 PROCEDURE — 82570 ASSAY OF URINE CREATININE: CPT

## 2022-04-15 PROCEDURE — 85025 COMPLETE CBC W/AUTO DIFF WBC: CPT

## 2022-04-15 PROCEDURE — 80061 LIPID PANEL: CPT

## 2022-04-15 PROCEDURE — 82043 UR ALBUMIN QUANTITATIVE: CPT

## 2022-04-15 PROCEDURE — 80158 DRUG ASSAY CYCLOSPORINE: CPT

## 2022-04-15 PROCEDURE — 81001 URINALYSIS AUTO W/SCOPE: CPT

## 2022-04-17 LAB — CYCLOSPORINE A: 68.4 NG/ML

## 2022-07-04 DIAGNOSIS — Z94.0 RENAL TRANSPLANT RECIPIENT: ICD-10-CM

## 2022-07-04 DIAGNOSIS — N18.30 CKD (CHRONIC KIDNEY DISEASE) STAGE 3, GFR 30-59 ML/MIN (HCC): ICD-10-CM

## 2022-07-05 RX ORDER — ATORVASTATIN CALCIUM 20 MG/1
20 TABLET, FILM COATED ORAL DAILY
Qty: 90 TABLET | Refills: 1 | Status: SHIPPED | OUTPATIENT
Start: 2022-07-05

## 2022-07-05 RX ORDER — LOSARTAN POTASSIUM 50 MG/1
50 TABLET ORAL DAILY
Qty: 90 TABLET | Refills: 1 | Status: SHIPPED | OUTPATIENT
Start: 2022-07-05

## 2022-07-05 RX ORDER — MYCOPHENOLATE MOFETIL 500 MG/1
1000 TABLET ORAL 2 TIMES DAILY
Qty: 360 TABLET | Refills: 1 | Status: SHIPPED | OUTPATIENT
Start: 2022-07-05

## 2022-07-05 RX ORDER — PREDNISONE 1 MG/1
5 TABLET ORAL DAILY
Qty: 90 TABLET | Refills: 1 | Status: SHIPPED | OUTPATIENT
Start: 2022-07-05

## 2022-07-05 RX ORDER — CYCLOSPORINE 25 MG/1
CAPSULE, GELATIN COATED ORAL
Qty: 450 CAPSULE | Refills: 1 | Status: SHIPPED | OUTPATIENT
Start: 2022-07-05

## 2022-07-07 ENCOUNTER — LAB ENCOUNTER (OUTPATIENT)
Dept: LAB | Age: 62
End: 2022-07-07
Attending: INTERNAL MEDICINE
Payer: COMMERCIAL

## 2022-07-07 DIAGNOSIS — I10 ESSENTIAL HYPERTENSION: ICD-10-CM

## 2022-07-07 DIAGNOSIS — N18.30 STAGE 3 CHRONIC KIDNEY DISEASE, UNSPECIFIED WHETHER STAGE 3A OR 3B CKD (HCC): ICD-10-CM

## 2022-07-07 DIAGNOSIS — Z94.0 RENAL TRANSPLANT RECIPIENT: ICD-10-CM

## 2022-07-07 LAB
ALBUMIN SERPL-MCNC: 3.3 G/DL (ref 3.4–5)
ALBUMIN/GLOB SERPL: 1 {RATIO} (ref 1–2)
ALP LIVER SERPL-CCNC: 87 U/L
ALT SERPL-CCNC: 18 U/L
ANION GAP SERPL CALC-SCNC: 5 MMOL/L (ref 0–18)
AST SERPL-CCNC: 12 U/L (ref 15–37)
BASOPHILS # BLD AUTO: 0.07 X10(3) UL (ref 0–0.2)
BASOPHILS NFR BLD AUTO: 1 %
BILIRUB SERPL-MCNC: 0.6 MG/DL (ref 0.1–2)
BUN BLD-MCNC: 22 MG/DL (ref 7–18)
CALCIUM BLD-MCNC: 9.7 MG/DL (ref 8.5–10.1)
CHLORIDE SERPL-SCNC: 110 MMOL/L (ref 98–112)
CHOLEST SERPL-MCNC: 153 MG/DL (ref ?–200)
CO2 SERPL-SCNC: 28 MMOL/L (ref 21–32)
CREAT BLD-MCNC: 1.39 MG/DL
CREAT UR-SCNC: 87.9 MG/DL
EOSINOPHIL # BLD AUTO: 0.15 X10(3) UL (ref 0–0.7)
EOSINOPHIL NFR BLD AUTO: 2.1 %
ERYTHROCYTE [DISTWIDTH] IN BLOOD BY AUTOMATED COUNT: 12.7 %
FASTING PATIENT LIPID ANSWER: YES
FASTING STATUS PATIENT QL REPORTED: YES
GLOBULIN PLAS-MCNC: 3.4 G/DL (ref 2.8–4.4)
GLUCOSE BLD-MCNC: 91 MG/DL (ref 70–99)
HCT VFR BLD AUTO: 38.6 %
HDLC SERPL-MCNC: 57 MG/DL (ref 40–59)
HGB BLD-MCNC: 11.9 G/DL
IMM GRANULOCYTES # BLD AUTO: 0.02 X10(3) UL (ref 0–1)
IMM GRANULOCYTES NFR BLD: 0.3 %
LDLC SERPL CALC-MCNC: 79 MG/DL (ref ?–100)
LYMPHOCYTES # BLD AUTO: 2.75 X10(3) UL (ref 1–4)
LYMPHOCYTES NFR BLD AUTO: 39 %
MCH RBC QN AUTO: 29.9 PG (ref 26–34)
MCHC RBC AUTO-ENTMCNC: 30.8 G/DL (ref 31–37)
MCV RBC AUTO: 97 FL
MICROALBUMIN UR-MCNC: 2.26 MG/DL
MICROALBUMIN/CREAT 24H UR-RTO: 25.7 UG/MG (ref ?–30)
MONOCYTES # BLD AUTO: 0.5 X10(3) UL (ref 0.1–1)
MONOCYTES NFR BLD AUTO: 7.1 %
NEUTROPHILS # BLD AUTO: 3.57 X10 (3) UL (ref 1.5–7.7)
NEUTROPHILS # BLD AUTO: 3.57 X10(3) UL (ref 1.5–7.7)
NEUTROPHILS NFR BLD AUTO: 50.5 %
NONHDLC SERPL-MCNC: 96 MG/DL (ref ?–130)
OSMOLALITY SERPL CALC.SUM OF ELEC: 299 MOSM/KG (ref 275–295)
PLATELET # BLD AUTO: 249 10(3)UL (ref 150–450)
POTASSIUM SERPL-SCNC: 4.5 MMOL/L (ref 3.5–5.1)
PROT SERPL-MCNC: 6.7 G/DL (ref 6.4–8.2)
RBC # BLD AUTO: 3.98 X10(6)UL
SODIUM SERPL-SCNC: 143 MMOL/L (ref 136–145)
TRIGL SERPL-MCNC: 89 MG/DL (ref 30–149)
VLDLC SERPL CALC-MCNC: 14 MG/DL (ref 0–30)
WBC # BLD AUTO: 7.1 X10(3) UL (ref 4–11)

## 2022-07-07 PROCEDURE — 80158 DRUG ASSAY CYCLOSPORINE: CPT

## 2022-07-07 PROCEDURE — 80053 COMPREHEN METABOLIC PANEL: CPT

## 2022-07-07 PROCEDURE — 36415 COLL VENOUS BLD VENIPUNCTURE: CPT

## 2022-07-07 PROCEDURE — 82570 ASSAY OF URINE CREATININE: CPT

## 2022-07-07 PROCEDURE — 85025 COMPLETE CBC W/AUTO DIFF WBC: CPT

## 2022-07-07 PROCEDURE — 82043 UR ALBUMIN QUANTITATIVE: CPT

## 2022-07-07 PROCEDURE — 80061 LIPID PANEL: CPT

## 2022-07-07 PROCEDURE — 81001 URINALYSIS AUTO W/SCOPE: CPT

## 2022-07-08 LAB
BILIRUB UR QL STRIP.AUTO: NEGATIVE
COLOR UR AUTO: YELLOW
GLUCOSE UR STRIP.AUTO-MCNC: NEGATIVE MG/DL
KETONES UR STRIP.AUTO-MCNC: NEGATIVE MG/DL
NITRITE UR QL STRIP.AUTO: NEGATIVE
PH UR STRIP.AUTO: 6.5 [PH] (ref 5–8)
PROT UR STRIP.AUTO-MCNC: NEGATIVE MG/DL
SP GR UR STRIP.AUTO: 1.02 (ref 1–1.03)
UROBILINOGEN UR STRIP.AUTO-MCNC: 0.2 MG/DL
WBC #/AREA URNS AUTO: >50 /HPF

## 2022-07-09 LAB — CYCLOSPORINE A: 68 NG/ML

## 2022-08-12 ENCOUNTER — HOSPITAL ENCOUNTER (OUTPATIENT)
Dept: BONE DENSITY | Age: 62
Discharge: HOME | End: 2022-08-12
Attending: ADVANCED PRACTICE MIDWIFE
Payer: COMMERCIAL

## 2022-08-12 PROCEDURE — 77080 DXA BONE DENSITY AXIAL: CPT | Performed by: ADVANCED PRACTICE MIDWIFE

## 2022-08-23 PROBLEM — D12.3 BENIGN NEOPLASM OF TRANSVERSE COLON: Status: ACTIVE | Noted: 2022-08-23

## 2022-08-23 PROBLEM — Z86.0101 HISTORY OF ADENOMATOUS POLYP OF COLON: Status: ACTIVE | Noted: 2022-08-23

## 2022-08-23 PROBLEM — Z86.010 HISTORY OF ADENOMATOUS POLYP OF COLON: Status: ACTIVE | Noted: 2022-08-23

## 2022-08-29 ENCOUNTER — HOSPITAL ENCOUNTER (OUTPATIENT)
Dept: MAMMOGRAPHY | Facility: HOSPITAL | Age: 62
Discharge: HOME | End: 2022-08-29
Attending: ADVANCED PRACTICE MIDWIFE
Payer: COMMERCIAL

## 2022-08-29 PROCEDURE — 77063 BREAST TOMOSYNTHESIS BI: CPT | Performed by: ADVANCED PRACTICE MIDWIFE

## 2022-08-29 PROCEDURE — 77067 SCR MAMMO BI INCL CAD: CPT | Performed by: ADVANCED PRACTICE MIDWIFE

## 2022-09-27 ENCOUNTER — LABORATORY ENCOUNTER (OUTPATIENT)
Dept: LAB | Age: 62
End: 2022-09-27
Attending: INTERNAL MEDICINE

## 2022-09-27 DIAGNOSIS — E11.9 TYPE 2 DIABETES MELLITUS WITHOUT COMPLICATION, WITHOUT LONG-TERM CURRENT USE OF INSULIN (HCC): Primary | ICD-10-CM

## 2022-09-27 DIAGNOSIS — N18.30 STAGE 3 CHRONIC KIDNEY DISEASE, UNSPECIFIED WHETHER STAGE 3A OR 3B CKD (HCC): ICD-10-CM

## 2022-09-27 DIAGNOSIS — I10 ESSENTIAL HYPERTENSION: ICD-10-CM

## 2022-09-27 DIAGNOSIS — Z94.0 RENAL TRANSPLANT RECIPIENT: ICD-10-CM

## 2022-09-27 LAB
ALBUMIN SERPL-MCNC: 3.5 G/DL (ref 3.4–5)
ALBUMIN/GLOB SERPL: 0.9 {RATIO} (ref 1–2)
ALP LIVER SERPL-CCNC: 87 U/L
ALT SERPL-CCNC: 18 U/L
ANION GAP SERPL CALC-SCNC: 7 MMOL/L (ref 0–18)
AST SERPL-CCNC: 14 U/L (ref 15–37)
BASOPHILS # BLD AUTO: 0.07 X10(3) UL (ref 0–0.2)
BASOPHILS NFR BLD AUTO: 1 %
BILIRUB SERPL-MCNC: 0.9 MG/DL (ref 0.1–2)
BILIRUB UR QL STRIP.AUTO: NEGATIVE
BUN BLD-MCNC: 24 MG/DL (ref 7–18)
CALCIUM BLD-MCNC: 10 MG/DL (ref 8.5–10.1)
CHLORIDE SERPL-SCNC: 110 MMOL/L (ref 98–112)
CHOLEST SERPL-MCNC: 146 MG/DL (ref ?–200)
CLARITY UR REFRACT.AUTO: CLEAR
CO2 SERPL-SCNC: 26 MMOL/L (ref 21–32)
COLOR UR AUTO: YELLOW
CREAT BLD-MCNC: 1.44 MG/DL
CREAT UR-SCNC: 121 MG/DL
EOSINOPHIL # BLD AUTO: 0.12 X10(3) UL (ref 0–0.7)
EOSINOPHIL NFR BLD AUTO: 1.8 %
ERYTHROCYTE [DISTWIDTH] IN BLOOD BY AUTOMATED COUNT: 12.8 %
EST. AVERAGE GLUCOSE BLD GHB EST-MCNC: 131 MG/DL (ref 68–126)
FASTING PATIENT LIPID ANSWER: YES
FASTING STATUS PATIENT QL REPORTED: YES
GFR SERPLBLD BASED ON 1.73 SQ M-ARVRAT: 41 ML/MIN/1.73M2 (ref 60–?)
GLOBULIN PLAS-MCNC: 3.7 G/DL (ref 2.8–4.4)
GLUCOSE BLD-MCNC: 90 MG/DL (ref 70–99)
GLUCOSE UR STRIP.AUTO-MCNC: NEGATIVE MG/DL
HBA1C MFR BLD: 6.2 % (ref ?–5.7)
HCT VFR BLD AUTO: 39.4 %
HDLC SERPL-MCNC: 67 MG/DL (ref 40–59)
HGB BLD-MCNC: 12.1 G/DL
IMM GRANULOCYTES # BLD AUTO: 0.01 X10(3) UL (ref 0–1)
IMM GRANULOCYTES NFR BLD: 0.1 %
KETONES UR STRIP.AUTO-MCNC: NEGATIVE MG/DL
LDLC SERPL CALC-MCNC: 62 MG/DL (ref ?–100)
LYMPHOCYTES # BLD AUTO: 3 X10(3) UL (ref 1–4)
LYMPHOCYTES NFR BLD AUTO: 44 %
MCH RBC QN AUTO: 29.7 PG (ref 26–34)
MCHC RBC AUTO-ENTMCNC: 30.7 G/DL (ref 31–37)
MCV RBC AUTO: 96.6 FL
MICROALBUMIN UR-MCNC: 4.09 MG/DL
MICROALBUMIN/CREAT 24H UR-RTO: 33.8 UG/MG (ref ?–30)
MONOCYTES # BLD AUTO: 0.55 X10(3) UL (ref 0.1–1)
MONOCYTES NFR BLD AUTO: 8.1 %
NEUTROPHILS # BLD AUTO: 3.07 X10 (3) UL (ref 1.5–7.7)
NEUTROPHILS # BLD AUTO: 3.07 X10(3) UL (ref 1.5–7.7)
NEUTROPHILS NFR BLD AUTO: 45 %
NITRITE UR QL STRIP.AUTO: POSITIVE
NONHDLC SERPL-MCNC: 79 MG/DL (ref ?–130)
OSMOLALITY SERPL CALC.SUM OF ELEC: 300 MOSM/KG (ref 275–295)
PH UR STRIP.AUTO: 6 [PH] (ref 5–8)
PLATELET # BLD AUTO: 235 10(3)UL (ref 150–450)
POTASSIUM SERPL-SCNC: 3.8 MMOL/L (ref 3.5–5.1)
PROT SERPL-MCNC: 7.2 G/DL (ref 6.4–8.2)
PROT UR STRIP.AUTO-MCNC: NEGATIVE MG/DL
RBC # BLD AUTO: 4.08 X10(6)UL
SODIUM SERPL-SCNC: 143 MMOL/L (ref 136–145)
SP GR UR STRIP.AUTO: 1.02 (ref 1–1.03)
TRIGL SERPL-MCNC: 92 MG/DL (ref 30–149)
UROBILINOGEN UR STRIP.AUTO-MCNC: 0.2 MG/DL
VLDLC SERPL CALC-MCNC: 14 MG/DL (ref 0–30)
WBC # BLD AUTO: 6.8 X10(3) UL (ref 4–11)

## 2022-09-27 PROCEDURE — 85025 COMPLETE CBC W/AUTO DIFF WBC: CPT | Performed by: INTERNAL MEDICINE

## 2022-09-27 PROCEDURE — 82570 ASSAY OF URINE CREATININE: CPT | Performed by: INTERNAL MEDICINE

## 2022-09-27 PROCEDURE — 82043 UR ALBUMIN QUANTITATIVE: CPT | Performed by: INTERNAL MEDICINE

## 2022-09-27 PROCEDURE — 80053 COMPREHEN METABOLIC PANEL: CPT | Performed by: INTERNAL MEDICINE

## 2022-09-27 PROCEDURE — 81001 URINALYSIS AUTO W/SCOPE: CPT | Performed by: INTERNAL MEDICINE

## 2022-09-27 PROCEDURE — 3044F HG A1C LEVEL LT 7.0%: CPT | Performed by: INTERNAL MEDICINE

## 2022-09-27 PROCEDURE — 80158 DRUG ASSAY CYCLOSPORINE: CPT | Performed by: INTERNAL MEDICINE

## 2022-09-27 PROCEDURE — 80061 LIPID PANEL: CPT | Performed by: INTERNAL MEDICINE

## 2022-09-29 LAB — CYCLOSPORINE A: 79.8 NG/ML

## 2022-11-22 DIAGNOSIS — I10 ESSENTIAL HYPERTENSION: ICD-10-CM

## 2022-11-22 DIAGNOSIS — Z94.0 RENAL TRANSPLANT RECIPIENT: ICD-10-CM

## 2022-11-22 DIAGNOSIS — N18.30 STAGE 3 CHRONIC KIDNEY DISEASE, UNSPECIFIED WHETHER STAGE 3A OR 3B CKD (HCC): Primary | ICD-10-CM

## 2022-12-02 ENCOUNTER — LABORATORY ENCOUNTER (OUTPATIENT)
Dept: LAB | Age: 62
End: 2022-12-02
Attending: INTERNAL MEDICINE
Payer: COMMERCIAL

## 2022-12-02 DIAGNOSIS — N18.30 STAGE 3 CHRONIC KIDNEY DISEASE, UNSPECIFIED WHETHER STAGE 3A OR 3B CKD (HCC): ICD-10-CM

## 2022-12-02 DIAGNOSIS — Z94.0 RENAL TRANSPLANT RECIPIENT: ICD-10-CM

## 2022-12-02 DIAGNOSIS — I10 ESSENTIAL HYPERTENSION: ICD-10-CM

## 2022-12-02 LAB
ALBUMIN SERPL-MCNC: 3.4 G/DL (ref 3.4–5)
ALBUMIN/GLOB SERPL: 1.1 {RATIO} (ref 1–2)
ALP LIVER SERPL-CCNC: 81 U/L
ALT SERPL-CCNC: 16 U/L
ANION GAP SERPL CALC-SCNC: 4 MMOL/L (ref 0–18)
AST SERPL-CCNC: 12 U/L (ref 15–37)
BASOPHILS # BLD AUTO: 0.08 X10(3) UL (ref 0–0.2)
BASOPHILS NFR BLD AUTO: 1.1 %
BILIRUB SERPL-MCNC: 0.7 MG/DL (ref 0.1–2)
BILIRUB UR QL STRIP.AUTO: NEGATIVE
BUN BLD-MCNC: 28 MG/DL (ref 7–18)
CALCIUM BLD-MCNC: 9.8 MG/DL (ref 8.5–10.1)
CHLORIDE SERPL-SCNC: 112 MMOL/L (ref 98–112)
CHOLEST SERPL-MCNC: 152 MG/DL (ref ?–200)
CLARITY UR REFRACT.AUTO: CLEAR
CO2 SERPL-SCNC: 27 MMOL/L (ref 21–32)
COLOR UR AUTO: YELLOW
CREAT BLD-MCNC: 1.38 MG/DL
CREAT UR-SCNC: 115 MG/DL
EOSINOPHIL # BLD AUTO: 0.16 X10(3) UL (ref 0–0.7)
EOSINOPHIL NFR BLD AUTO: 2.2 %
ERYTHROCYTE [DISTWIDTH] IN BLOOD BY AUTOMATED COUNT: 13.2 %
FASTING PATIENT LIPID ANSWER: YES
FASTING STATUS PATIENT QL REPORTED: YES
GFR SERPLBLD BASED ON 1.73 SQ M-ARVRAT: 43 ML/MIN/1.73M2 (ref 60–?)
GLOBULIN PLAS-MCNC: 3 G/DL (ref 2.8–4.4)
GLUCOSE BLD-MCNC: 115 MG/DL (ref 70–99)
GLUCOSE UR STRIP.AUTO-MCNC: NEGATIVE MG/DL
HCT VFR BLD AUTO: 37.7 %
HDLC SERPL-MCNC: 70 MG/DL (ref 40–59)
HGB BLD-MCNC: 12 G/DL
IMM GRANULOCYTES # BLD AUTO: 0.02 X10(3) UL (ref 0–1)
IMM GRANULOCYTES NFR BLD: 0.3 %
KETONES UR STRIP.AUTO-MCNC: NEGATIVE MG/DL
LDLC SERPL CALC-MCNC: 63 MG/DL (ref ?–100)
LYMPHOCYTES # BLD AUTO: 3.15 X10(3) UL (ref 1–4)
LYMPHOCYTES NFR BLD AUTO: 43.8 %
MCH RBC QN AUTO: 30.7 PG (ref 26–34)
MCHC RBC AUTO-ENTMCNC: 31.8 G/DL (ref 31–37)
MCV RBC AUTO: 96.4 FL
MICROALBUMIN UR-MCNC: 4.23 MG/DL
MICROALBUMIN/CREAT 24H UR-RTO: 36.8 UG/MG (ref ?–30)
MONOCYTES # BLD AUTO: 0.6 X10(3) UL (ref 0.1–1)
MONOCYTES NFR BLD AUTO: 8.3 %
NEUTROPHILS # BLD AUTO: 3.18 X10 (3) UL (ref 1.5–7.7)
NEUTROPHILS # BLD AUTO: 3.18 X10(3) UL (ref 1.5–7.7)
NEUTROPHILS NFR BLD AUTO: 44.3 %
NITRITE UR QL STRIP.AUTO: POSITIVE
NONHDLC SERPL-MCNC: 82 MG/DL (ref ?–130)
OSMOLALITY SERPL CALC.SUM OF ELEC: 302 MOSM/KG (ref 275–295)
PH UR STRIP.AUTO: 5.5 [PH] (ref 5–8)
PLATELET # BLD AUTO: 266 10(3)UL (ref 150–450)
POTASSIUM SERPL-SCNC: 4.3 MMOL/L (ref 3.5–5.1)
PROT SERPL-MCNC: 6.4 G/DL (ref 6.4–8.2)
PROT UR STRIP.AUTO-MCNC: NEGATIVE MG/DL
RBC # BLD AUTO: 3.91 X10(6)UL
SODIUM SERPL-SCNC: 143 MMOL/L (ref 136–145)
SP GR UR STRIP.AUTO: 1.02 (ref 1–1.03)
TRIGL SERPL-MCNC: 106 MG/DL (ref 30–149)
UROBILINOGEN UR STRIP.AUTO-MCNC: 0.2 MG/DL
VLDLC SERPL CALC-MCNC: 16 MG/DL (ref 0–30)
WBC # BLD AUTO: 7.2 X10(3) UL (ref 4–11)

## 2022-12-02 PROCEDURE — 80061 LIPID PANEL: CPT | Performed by: INTERNAL MEDICINE

## 2022-12-02 PROCEDURE — 82570 ASSAY OF URINE CREATININE: CPT | Performed by: INTERNAL MEDICINE

## 2022-12-02 PROCEDURE — 3061F NEG MICROALBUMINURIA REV: CPT | Performed by: INTERNAL MEDICINE

## 2022-12-02 PROCEDURE — 3060F POS MICROALBUMINURIA REV: CPT | Performed by: INTERNAL MEDICINE

## 2022-12-02 PROCEDURE — 81001 URINALYSIS AUTO W/SCOPE: CPT | Performed by: INTERNAL MEDICINE

## 2022-12-02 PROCEDURE — 82043 UR ALBUMIN QUANTITATIVE: CPT | Performed by: INTERNAL MEDICINE

## 2022-12-02 PROCEDURE — 85025 COMPLETE CBC W/AUTO DIFF WBC: CPT | Performed by: INTERNAL MEDICINE

## 2022-12-02 PROCEDURE — 80053 COMPREHEN METABOLIC PANEL: CPT | Performed by: INTERNAL MEDICINE

## 2022-12-02 PROCEDURE — 80158 DRUG ASSAY CYCLOSPORINE: CPT | Performed by: INTERNAL MEDICINE

## 2022-12-04 LAB — CYCLOSPORINE A: 77.4 NG/ML

## 2022-12-07 ENCOUNTER — OFFICE VISIT (OUTPATIENT)
Dept: NEPHROLOGY | Facility: CLINIC | Age: 62
End: 2022-12-07
Payer: COMMERCIAL

## 2022-12-07 VITALS — DIASTOLIC BLOOD PRESSURE: 72 MMHG | BODY MASS INDEX: 35 KG/M2 | SYSTOLIC BLOOD PRESSURE: 120 MMHG | WEIGHT: 187 LBS

## 2022-12-07 DIAGNOSIS — I10 ESSENTIAL HYPERTENSION: ICD-10-CM

## 2022-12-07 DIAGNOSIS — Z94.0 RENAL TRANSPLANT RECIPIENT: ICD-10-CM

## 2022-12-07 DIAGNOSIS — N18.30 STAGE 3 CHRONIC KIDNEY DISEASE, UNSPECIFIED WHETHER STAGE 3A OR 3B CKD (HCC): Primary | ICD-10-CM

## 2022-12-07 PROCEDURE — 99214 OFFICE O/P EST MOD 30 MIN: CPT | Performed by: INTERNAL MEDICINE

## 2022-12-07 PROCEDURE — 3074F SYST BP LT 130 MM HG: CPT | Performed by: INTERNAL MEDICINE

## 2022-12-07 PROCEDURE — 3078F DIAST BP <80 MM HG: CPT | Performed by: INTERNAL MEDICINE

## 2022-12-07 RX ORDER — PREDNISONE 1 MG/1
5 TABLET ORAL DAILY
Qty: 90 TABLET | Refills: 3 | Status: SHIPPED | OUTPATIENT
Start: 2022-12-07 | End: 2022-12-07

## 2022-12-07 RX ORDER — LOSARTAN POTASSIUM 50 MG/1
50 TABLET ORAL DAILY
Qty: 90 TABLET | Refills: 1 | Status: SHIPPED | OUTPATIENT
Start: 2022-12-07

## 2022-12-07 RX ORDER — CYCLOSPORINE 25 MG/1
CAPSULE, GELATIN COATED ORAL
Qty: 450 CAPSULE | Refills: 1 | Status: SHIPPED | OUTPATIENT
Start: 2022-12-07

## 2022-12-07 RX ORDER — MYCOPHENOLATE MOFETIL 500 MG/1
1000 TABLET ORAL 2 TIMES DAILY
Qty: 360 TABLET | Refills: 1 | Status: SHIPPED | OUTPATIENT
Start: 2022-12-07

## 2022-12-07 RX ORDER — MYCOPHENOLATE MOFETIL 500 MG/1
1000 TABLET ORAL 2 TIMES DAILY
Qty: 360 TABLET | Refills: 3 | Status: SHIPPED | OUTPATIENT
Start: 2022-12-07 | End: 2022-12-07

## 2022-12-07 RX ORDER — PREDNISONE 1 MG/1
5 TABLET ORAL DAILY
Qty: 90 TABLET | Refills: 1 | Status: SHIPPED | OUTPATIENT
Start: 2022-12-07

## 2022-12-07 RX ORDER — LOSARTAN POTASSIUM 25 MG/1
50 TABLET ORAL DAILY
Qty: 90 TABLET | Refills: 3 | Status: SHIPPED | OUTPATIENT
Start: 2022-12-07 | End: 2022-12-07 | Stop reason: DRUGHIGH

## 2022-12-07 RX ORDER — CYCLOSPORINE 25 MG/1
CAPSULE, GELATIN COATED ORAL
Qty: 450 CAPSULE | Refills: 0 | Status: SHIPPED | OUTPATIENT
Start: 2022-12-07 | End: 2022-12-07

## 2022-12-08 RX ORDER — ATORVASTATIN CALCIUM 20 MG/1
20 TABLET, FILM COATED ORAL DAILY
Qty: 90 TABLET | Refills: 1 | Status: SHIPPED | OUTPATIENT
Start: 2022-12-08

## 2023-01-03 ENCOUNTER — MED REC SCAN ONLY (OUTPATIENT)
Dept: FAMILY MEDICINE CLINIC | Facility: CLINIC | Age: 63
End: 2023-01-03

## 2023-04-11 DIAGNOSIS — Z94.0 STATUS POST KIDNEY TRANSPLANT: Primary | ICD-10-CM

## 2023-04-20 ENCOUNTER — LABORATORY ENCOUNTER (OUTPATIENT)
Dept: LAB | Age: 63
End: 2023-04-20
Attending: INTERNAL MEDICINE
Payer: MEDICARE

## 2023-04-20 DIAGNOSIS — E11.9 TYPE 2 DIABETES MELLITUS WITHOUT COMPLICATION, WITHOUT LONG-TERM CURRENT USE OF INSULIN (HCC): Primary | ICD-10-CM

## 2023-04-20 DIAGNOSIS — Z94.0 STATUS POST KIDNEY TRANSPLANT: ICD-10-CM

## 2023-04-20 LAB
ALBUMIN SERPL-MCNC: 3.5 G/DL (ref 3.4–5)
ALBUMIN/GLOB SERPL: 0.9 {RATIO} (ref 1–2)
ALP LIVER SERPL-CCNC: 90 U/L
ALT SERPL-CCNC: 16 U/L
ANION GAP SERPL CALC-SCNC: 6 MMOL/L (ref 0–18)
AST SERPL-CCNC: 16 U/L (ref 15–37)
BASOPHILS # BLD AUTO: 0.08 X10(3) UL (ref 0–0.2)
BASOPHILS NFR BLD AUTO: 1.1 %
BILIRUB SERPL-MCNC: 0.6 MG/DL (ref 0.1–2)
BILIRUB UR QL STRIP.AUTO: NEGATIVE
BUN BLD-MCNC: 28 MG/DL (ref 7–18)
CALCIUM BLD-MCNC: 9.8 MG/DL (ref 8.5–10.1)
CHLORIDE SERPL-SCNC: 113 MMOL/L (ref 98–112)
CHOLEST SERPL-MCNC: 165 MG/DL (ref ?–200)
CO2 SERPL-SCNC: 23 MMOL/L (ref 21–32)
COLOR UR AUTO: YELLOW
CREAT BLD-MCNC: 1.47 MG/DL
CREAT UR-SCNC: 64.3 MG/DL
EOSINOPHIL # BLD AUTO: 0.16 X10(3) UL (ref 0–0.7)
EOSINOPHIL NFR BLD AUTO: 2.3 %
ERYTHROCYTE [DISTWIDTH] IN BLOOD BY AUTOMATED COUNT: 13 %
EST. AVERAGE GLUCOSE BLD GHB EST-MCNC: 137 MG/DL (ref 68–126)
FASTING PATIENT LIPID ANSWER: YES
FASTING STATUS PATIENT QL REPORTED: YES
GFR SERPLBLD BASED ON 1.73 SQ M-ARVRAT: 39 ML/MIN/1.73M2 (ref 60–?)
GLOBULIN PLAS-MCNC: 3.7 G/DL (ref 2.8–4.4)
GLUCOSE BLD-MCNC: 108 MG/DL (ref 70–99)
GLUCOSE UR STRIP.AUTO-MCNC: NEGATIVE MG/DL
HBA1C MFR BLD: 6.4 % (ref ?–5.7)
HCT VFR BLD AUTO: 39.6 %
HDLC SERPL-MCNC: 60 MG/DL (ref 40–59)
HGB BLD-MCNC: 12.3 G/DL
IMM GRANULOCYTES # BLD AUTO: 0.02 X10(3) UL (ref 0–1)
IMM GRANULOCYTES NFR BLD: 0.3 %
KETONES UR STRIP.AUTO-MCNC: NEGATIVE MG/DL
LDLC SERPL CALC-MCNC: 78 MG/DL (ref ?–100)
LYMPHOCYTES # BLD AUTO: 2.94 X10(3) UL (ref 1–4)
LYMPHOCYTES NFR BLD AUTO: 42.1 %
MCH RBC QN AUTO: 29.5 PG (ref 26–34)
MCHC RBC AUTO-ENTMCNC: 31.1 G/DL (ref 31–37)
MCV RBC AUTO: 95 FL
MICROALBUMIN UR-MCNC: 4.45 MG/DL
MICROALBUMIN/CREAT 24H UR-RTO: 69.2 UG/MG (ref ?–30)
MONOCYTES # BLD AUTO: 0.56 X10(3) UL (ref 0.1–1)
MONOCYTES NFR BLD AUTO: 8 %
NEUTROPHILS # BLD AUTO: 3.22 X10 (3) UL (ref 1.5–7.7)
NEUTROPHILS # BLD AUTO: 3.22 X10(3) UL (ref 1.5–7.7)
NEUTROPHILS NFR BLD AUTO: 46.2 %
NITRITE UR QL STRIP.AUTO: POSITIVE
NONHDLC SERPL-MCNC: 105 MG/DL (ref ?–130)
OSMOLALITY SERPL CALC.SUM OF ELEC: 300 MOSM/KG (ref 275–295)
PH UR STRIP.AUTO: 5 [PH] (ref 5–8)
PLATELET # BLD AUTO: 254 10(3)UL (ref 150–450)
POTASSIUM SERPL-SCNC: 3.8 MMOL/L (ref 3.5–5.1)
PROT SERPL-MCNC: 7.2 G/DL (ref 6.4–8.2)
PROT UR STRIP.AUTO-MCNC: NEGATIVE MG/DL
RBC # BLD AUTO: 4.17 X10(6)UL
SODIUM SERPL-SCNC: 142 MMOL/L (ref 136–145)
SP GR UR STRIP.AUTO: 1.01 (ref 1–1.03)
TRIGL SERPL-MCNC: 158 MG/DL (ref 30–149)
UROBILINOGEN UR STRIP.AUTO-MCNC: <2 MG/DL
VLDLC SERPL CALC-MCNC: 25 MG/DL (ref 0–30)
WBC # BLD AUTO: 7 X10(3) UL (ref 4–11)

## 2023-04-20 PROCEDURE — 36415 COLL VENOUS BLD VENIPUNCTURE: CPT

## 2023-04-20 PROCEDURE — 3044F HG A1C LEVEL LT 7.0%: CPT | Performed by: FAMILY MEDICINE

## 2023-04-20 PROCEDURE — 80061 LIPID PANEL: CPT

## 2023-04-20 PROCEDURE — 80053 COMPREHEN METABOLIC PANEL: CPT

## 2023-04-20 PROCEDURE — 3060F POS MICROALBUMINURIA REV: CPT | Performed by: FAMILY MEDICINE

## 2023-04-20 PROCEDURE — 3061F NEG MICROALBUMINURIA REV: CPT | Performed by: FAMILY MEDICINE

## 2023-04-20 PROCEDURE — 82570 ASSAY OF URINE CREATININE: CPT

## 2023-04-20 PROCEDURE — 83036 HEMOGLOBIN GLYCOSYLATED A1C: CPT

## 2023-04-20 PROCEDURE — 82043 UR ALBUMIN QUANTITATIVE: CPT

## 2023-04-20 PROCEDURE — 81001 URINALYSIS AUTO W/SCOPE: CPT

## 2023-04-20 PROCEDURE — 85025 COMPLETE CBC W/AUTO DIFF WBC: CPT

## 2023-04-21 ENCOUNTER — TELEPHONE (OUTPATIENT)
Dept: NEPHROLOGY | Facility: CLINIC | Age: 63
End: 2023-04-21

## 2023-04-21 DIAGNOSIS — I10 ESSENTIAL HYPERTENSION: ICD-10-CM

## 2023-04-21 DIAGNOSIS — N18.30 STAGE 3 CHRONIC KIDNEY DISEASE, UNSPECIFIED WHETHER STAGE 3A OR 3B CKD (HCC): ICD-10-CM

## 2023-04-21 DIAGNOSIS — Z94.0 RENAL TRANSPLANT RECIPIENT: Primary | ICD-10-CM

## 2023-04-21 RX ORDER — PREDNISONE 5 MG/1
5 TABLET ORAL DAILY
Qty: 90 TABLET | Refills: 1 | Status: SHIPPED | OUTPATIENT
Start: 2023-04-21

## 2023-04-21 RX ORDER — MYCOPHENOLATE MOFETIL 500 MG/1
1000 TABLET ORAL 2 TIMES DAILY
Qty: 360 TABLET | Refills: 1 | Status: SHIPPED | OUTPATIENT
Start: 2023-04-21

## 2023-04-21 RX ORDER — CYCLOSPORINE 25 MG/1
CAPSULE, GELATIN COATED ORAL
Qty: 450 CAPSULE | Refills: 1 | Status: SHIPPED | OUTPATIENT
Start: 2023-04-21

## 2023-04-21 RX ORDER — LOSARTAN POTASSIUM 50 MG/1
50 TABLET ORAL DAILY
Qty: 90 TABLET | Refills: 1 | Status: SHIPPED | OUTPATIENT
Start: 2023-04-21

## 2023-04-21 NOTE — TELEPHONE ENCOUNTER
Mycophenolate Mofetil 500 MG Oral Tab 360 tablet 1     atorvastatin 20 MG Oral Tab 90 tablet 1     cycloSPORINE non-modified (SANDIMMUNE) 25 MG Oral Cap 450 capsule 1     losartan 50 MG Oral Tab 90 tablet 1     predniSONE 5 MG Oral Tab 90 tablet 1      [facility] ([facility] ) - Simon Quinteros [phone], [phone]    Last OV: 12/07/2022  Future OV: No future OV listed

## 2023-04-24 DIAGNOSIS — E78.2 MIXED HYPERLIPIDEMIA: Primary | ICD-10-CM

## 2023-04-24 RX ORDER — ATORVASTATIN CALCIUM 20 MG/1
20 TABLET, FILM COATED ORAL DAILY
Qty: 90 TABLET | Refills: 1 | Status: SHIPPED | OUTPATIENT
Start: 2023-04-24

## 2023-05-25 ENCOUNTER — OFFICE VISIT (OUTPATIENT)
Dept: FAMILY MEDICINE CLINIC | Facility: CLINIC | Age: 63
End: 2023-05-25
Payer: COMMERCIAL

## 2023-05-25 VITALS
BODY MASS INDEX: 35.12 KG/M2 | TEMPERATURE: 98 F | RESPIRATION RATE: 16 BRPM | HEIGHT: 61 IN | OXYGEN SATURATION: 99 % | SYSTOLIC BLOOD PRESSURE: 132 MMHG | HEART RATE: 68 BPM | DIASTOLIC BLOOD PRESSURE: 82 MMHG | WEIGHT: 186 LBS

## 2023-05-25 DIAGNOSIS — E11.9 TYPE 2 DIABETES MELLITUS WITHOUT COMPLICATION, WITHOUT LONG-TERM CURRENT USE OF INSULIN (HCC): ICD-10-CM

## 2023-05-25 DIAGNOSIS — Z00.00 MEDICARE ANNUAL WELLNESS VISIT, INITIAL: Primary | ICD-10-CM

## 2023-05-25 DIAGNOSIS — D12.3 BENIGN NEOPLASM OF TRANSVERSE COLON: ICD-10-CM

## 2023-05-25 DIAGNOSIS — Z86.010 HISTORY OF ADENOMATOUS POLYP OF COLON: ICD-10-CM

## 2023-05-25 DIAGNOSIS — Z12.31 VISIT FOR SCREENING MAMMOGRAM: ICD-10-CM

## 2023-05-25 DIAGNOSIS — M17.0 PRIMARY OSTEOARTHRITIS OF BOTH KNEES: ICD-10-CM

## 2023-05-25 DIAGNOSIS — E78.5 DYSLIPIDEMIA ASSOCIATED WITH TYPE 2 DIABETES MELLITUS (HCC): ICD-10-CM

## 2023-05-25 DIAGNOSIS — Z11.59 NEED FOR HEPATITIS C SCREENING TEST: ICD-10-CM

## 2023-05-25 DIAGNOSIS — E78.2 MIXED HYPERLIPIDEMIA: ICD-10-CM

## 2023-05-25 DIAGNOSIS — Z00.00 ENCOUNTER FOR ANNUAL HEALTH EXAMINATION: ICD-10-CM

## 2023-05-25 DIAGNOSIS — N18.30 DIABETES MELLITUS WITH STAGE 3 CHRONIC KIDNEY DISEASE (HCC): ICD-10-CM

## 2023-05-25 DIAGNOSIS — Z94.0 HISTORY OF RENAL TRANSPLANTATION: ICD-10-CM

## 2023-05-25 DIAGNOSIS — E11.22 DIABETES MELLITUS WITH STAGE 3 CHRONIC KIDNEY DISEASE (HCC): ICD-10-CM

## 2023-05-25 DIAGNOSIS — E66.01 MORBID (SEVERE) OBESITY DUE TO EXCESS CALORIES (HCC): ICD-10-CM

## 2023-05-25 DIAGNOSIS — E11.69 DYSLIPIDEMIA ASSOCIATED WITH TYPE 2 DIABETES MELLITUS (HCC): ICD-10-CM

## 2023-05-25 DIAGNOSIS — N01.9 GLOMERULONEPHRITIS, ACUTE RAPIDLY PROGRESSIVE: ICD-10-CM

## 2023-05-25 DIAGNOSIS — I10 ESSENTIAL HYPERTENSION WITH GOAL BLOOD PRESSURE LESS THAN 140/90: ICD-10-CM

## 2023-05-25 LAB
ATRIAL RATE: 62 BPM
HCV AB SERPL QL IA: NONREACTIVE
P AXIS: 51 DEGREES
P-R INTERVAL: 202 MS
Q-T INTERVAL: 410 MS
QRS DURATION: 72 MS
QTC CALCULATION (BEZET): 416 MS
R AXIS: 2 DEGREES
T AXIS: 29 DEGREES
TSI SER-ACNC: 1.63 MIU/ML (ref 0.36–3.74)
VENTRICULAR RATE: 62 BPM

## 2023-05-25 PROCEDURE — 84443 ASSAY THYROID STIM HORMONE: CPT | Performed by: FAMILY MEDICINE

## 2023-05-25 PROCEDURE — 86803 HEPATITIS C AB TEST: CPT | Performed by: FAMILY MEDICINE

## 2023-05-25 PROCEDURE — 3072F LOW RISK FOR RETINOPATHY: CPT | Performed by: FAMILY MEDICINE

## 2023-05-26 ENCOUNTER — TELEPHONE (OUTPATIENT)
Dept: FAMILY MEDICINE CLINIC | Facility: CLINIC | Age: 63
End: 2023-05-26

## 2023-05-26 NOTE — TELEPHONE ENCOUNTER
----- Message from Lucy Mcduffie DO sent at 5/26/2023  7:59 AM CDT -----  Please notify Maude Simons her labs looked good, no changes at this time.  Make sure she continues to follow up with her specialists as needed and have a copy of her diabetic eye exam sent to me when she gets it done in Nov

## 2023-06-07 ENCOUNTER — HOSPITAL ENCOUNTER (OUTPATIENT)
Dept: CT IMAGING | Age: 63
Discharge: HOME | End: 2023-06-07
Attending: FAMILY MEDICINE

## 2023-06-07 DIAGNOSIS — Z13.6 ENCOUNTER FOR SCREENING FOR CARDIOVASCULAR DISORDERS: ICD-10-CM

## 2023-06-07 DIAGNOSIS — Z13.9 ENCOUNTER FOR SCREENING: ICD-10-CM

## 2023-06-21 ENCOUNTER — TELEPHONE (OUTPATIENT)
Dept: FAMILY MEDICINE CLINIC | Facility: CLINIC | Age: 63
End: 2023-06-21

## 2023-06-21 NOTE — TELEPHONE ENCOUNTER
Pt was advised of results- verbalized understanding    Pt has a lot of 20mg tabs on hand- will call back when she is running low so wecn send in 1 40mg tab

## 2023-06-21 NOTE — TELEPHONE ENCOUNTER
So there are 2 things, one she has some nodules in her lungs and I cannot recall if she was a smoker before, if not we can just recheck it in a year, if so then we need to do some further testing.  As far as her heart scan score it went up from 69, to 159, so I think we need to bump her atorvastatin up to 40 mg and recheck in 3 months, if she agrees

## 2023-06-21 NOTE — TELEPHONE ENCOUNTER
PATIENT IS CALLING THIS MORNING. SHE SAYS THAT DR MCFARLANE CALLED HER YESTERDAY REGARDING HER HEART SCAN RESULTS.

## 2023-06-27 ENCOUNTER — TELEPHONE (OUTPATIENT)
Dept: FAMILY MEDICINE CLINIC | Facility: CLINIC | Age: 63
End: 2023-06-27

## 2023-07-17 DIAGNOSIS — Z94.0 STATUS POST KIDNEY TRANSPLANT: Primary | ICD-10-CM

## 2023-07-19 ENCOUNTER — LABORATORY ENCOUNTER (OUTPATIENT)
Dept: LAB | Age: 63
End: 2023-07-19
Attending: INTERNAL MEDICINE
Payer: MEDICARE

## 2023-07-19 DIAGNOSIS — Z94.0 STATUS POST KIDNEY TRANSPLANT: Primary | ICD-10-CM

## 2023-07-19 LAB
ALBUMIN SERPL-MCNC: 3.4 G/DL (ref 3.4–5)
ALBUMIN/GLOB SERPL: 1.1 {RATIO} (ref 1–2)
ALP LIVER SERPL-CCNC: 99 U/L
ALT SERPL-CCNC: 17 U/L
ANION GAP SERPL CALC-SCNC: 9 MMOL/L (ref 0–18)
AST SERPL-CCNC: 15 U/L (ref 15–37)
BASOPHILS # BLD AUTO: 0.1 X10(3) UL (ref 0–0.2)
BASOPHILS NFR BLD AUTO: 1.5 %
BILIRUB SERPL-MCNC: 0.7 MG/DL (ref 0.1–2)
BILIRUB UR QL STRIP.AUTO: NEGATIVE
BUN BLD-MCNC: 25 MG/DL (ref 7–18)
CALCIUM BLD-MCNC: 9.3 MG/DL (ref 8.5–10.1)
CHLORIDE SERPL-SCNC: 111 MMOL/L (ref 98–112)
CO2 SERPL-SCNC: 22 MMOL/L (ref 21–32)
COLOR UR AUTO: YELLOW
CREAT BLD-MCNC: 1.49 MG/DL
CREAT UR-SCNC: 105 MG/DL
EOSINOPHIL # BLD AUTO: 0.23 X10(3) UL (ref 0–0.7)
EOSINOPHIL NFR BLD AUTO: 3.5 %
ERYTHROCYTE [DISTWIDTH] IN BLOOD BY AUTOMATED COUNT: 13 %
FASTING STATUS PATIENT QL REPORTED: YES
GFR SERPLBLD BASED ON 1.73 SQ M-ARVRAT: 39 ML/MIN/1.73M2 (ref 60–?)
GLOBULIN PLAS-MCNC: 3.2 G/DL (ref 2.8–4.4)
GLUCOSE BLD-MCNC: 99 MG/DL (ref 70–99)
GLUCOSE UR STRIP.AUTO-MCNC: NEGATIVE MG/DL
HCT VFR BLD AUTO: 37.6 %
HGB BLD-MCNC: 12 G/DL
IMM GRANULOCYTES # BLD AUTO: 0.02 X10(3) UL (ref 0–1)
IMM GRANULOCYTES NFR BLD: 0.3 %
KETONES UR STRIP.AUTO-MCNC: NEGATIVE MG/DL
LYMPHOCYTES # BLD AUTO: 2.6 X10(3) UL (ref 1–4)
LYMPHOCYTES NFR BLD AUTO: 39.5 %
MCH RBC QN AUTO: 29.4 PG (ref 26–34)
MCHC RBC AUTO-ENTMCNC: 31.9 G/DL (ref 31–37)
MCV RBC AUTO: 92.2 FL
MICROALBUMIN UR-MCNC: 3.22 MG/DL
MICROALBUMIN/CREAT 24H UR-RTO: 30.7 UG/MG (ref ?–30)
MONOCYTES # BLD AUTO: 0.55 X10(3) UL (ref 0.1–1)
MONOCYTES NFR BLD AUTO: 8.3 %
NEUTROPHILS # BLD AUTO: 3.09 X10 (3) UL (ref 1.5–7.7)
NEUTROPHILS # BLD AUTO: 3.09 X10(3) UL (ref 1.5–7.7)
NEUTROPHILS NFR BLD AUTO: 46.9 %
NITRITE UR QL STRIP.AUTO: POSITIVE
OSMOLALITY SERPL CALC.SUM OF ELEC: 298 MOSM/KG (ref 275–295)
PH UR STRIP.AUTO: 5 [PH] (ref 5–8)
PLATELET # BLD AUTO: 242 10(3)UL (ref 150–450)
POTASSIUM SERPL-SCNC: 4.2 MMOL/L (ref 3.5–5.1)
PROT SERPL-MCNC: 6.6 G/DL (ref 6.4–8.2)
PROT UR STRIP.AUTO-MCNC: NEGATIVE MG/DL
RBC # BLD AUTO: 4.08 X10(6)UL
SODIUM SERPL-SCNC: 142 MMOL/L (ref 136–145)
SP GR UR STRIP.AUTO: 1.01 (ref 1–1.03)
UROBILINOGEN UR STRIP.AUTO-MCNC: <2 MG/DL
WBC # BLD AUTO: 6.6 X10(3) UL (ref 4–11)

## 2023-07-19 PROCEDURE — 36415 COLL VENOUS BLD VENIPUNCTURE: CPT

## 2023-07-19 PROCEDURE — 81001 URINALYSIS AUTO W/SCOPE: CPT

## 2023-07-19 PROCEDURE — 82043 UR ALBUMIN QUANTITATIVE: CPT

## 2023-07-19 PROCEDURE — 3061F NEG MICROALBUMINURIA REV: CPT | Performed by: FAMILY MEDICINE

## 2023-07-19 PROCEDURE — 80158 DRUG ASSAY CYCLOSPORINE: CPT

## 2023-07-19 PROCEDURE — 82570 ASSAY OF URINE CREATININE: CPT

## 2023-07-19 PROCEDURE — 85025 COMPLETE CBC W/AUTO DIFF WBC: CPT

## 2023-07-19 PROCEDURE — 80053 COMPREHEN METABOLIC PANEL: CPT

## 2023-07-19 PROCEDURE — 3060F POS MICROALBUMINURIA REV: CPT | Performed by: FAMILY MEDICINE

## 2023-07-20 ENCOUNTER — TELEPHONE (OUTPATIENT)
Dept: PHYSICAL THERAPY | Facility: HOSPITAL | Age: 63
End: 2023-07-20

## 2023-07-22 LAB — CYCLOSPORINE -MS/MS: 71 NG/ML

## 2023-08-28 ENCOUNTER — EKG ENCOUNTER (OUTPATIENT)
Dept: LAB | Facility: HOSPITAL | Age: 63
End: 2023-08-28
Attending: ORTHOPAEDIC SURGERY
Payer: MEDICARE

## 2023-08-28 ENCOUNTER — HOSPITAL ENCOUNTER (OUTPATIENT)
Dept: PHYSICAL THERAPY | Facility: HOSPITAL | Age: 63
Discharge: HOME | End: 2023-08-28
Attending: ORTHOPAEDIC SURGERY
Payer: MEDICARE

## 2023-08-28 ENCOUNTER — OFFICE VISIT (OUTPATIENT)
Dept: NEPHROLOGY | Facility: CLINIC | Age: 63
End: 2023-08-28
Payer: COMMERCIAL

## 2023-08-28 VITALS — DIASTOLIC BLOOD PRESSURE: 80 MMHG | WEIGHT: 186.13 LBS | BODY MASS INDEX: 35 KG/M2 | SYSTOLIC BLOOD PRESSURE: 122 MMHG

## 2023-08-28 DIAGNOSIS — N18.30 STAGE 3 CHRONIC KIDNEY DISEASE, UNSPECIFIED WHETHER STAGE 3A OR 3B CKD (HCC): Primary | ICD-10-CM

## 2023-08-28 DIAGNOSIS — Z01.818 PRE-OP TESTING: ICD-10-CM

## 2023-08-28 DIAGNOSIS — I10 ESSENTIAL HYPERTENSION: ICD-10-CM

## 2023-08-28 DIAGNOSIS — Z94.0 RENAL TRANSPLANT RECIPIENT: ICD-10-CM

## 2023-08-28 LAB
ALBUMIN SERPL-MCNC: 3.7 G/DL (ref 3.4–5)
ALBUMIN/GLOB SERPL: 0.7 {RATIO} (ref 1–2)
ALP LIVER SERPL-CCNC: 109 U/L
ALT SERPL-CCNC: 26 U/L
ANION GAP SERPL CALC-SCNC: 7 MMOL/L (ref 0–18)
ANTIBODY SCREEN: NEGATIVE
AST SERPL-CCNC: 14 U/L (ref 15–37)
ATRIAL RATE: 56 BPM
BASOPHILS # BLD AUTO: 0.06 X10(3) UL (ref 0–0.2)
BASOPHILS NFR BLD AUTO: 0.6 %
BILIRUB SERPL-MCNC: 0.9 MG/DL (ref 0.1–2)
BUN BLD-MCNC: 31 MG/DL (ref 7–18)
CALCIUM BLD-MCNC: 10.3 MG/DL (ref 8.5–10.1)
CHLORIDE SERPL-SCNC: 110 MMOL/L (ref 98–112)
CO2 SERPL-SCNC: 25 MMOL/L (ref 21–32)
CREAT BLD-MCNC: 1.59 MG/DL
EGFRCR SERPLBLD CKD-EPI 2021: 36 ML/MIN/1.73M2 (ref 60–?)
EOSINOPHIL # BLD AUTO: 0.04 X10(3) UL (ref 0–0.7)
EOSINOPHIL NFR BLD AUTO: 0.4 %
ERYTHROCYTE [DISTWIDTH] IN BLOOD BY AUTOMATED COUNT: 12.8 %
FASTING STATUS PATIENT QL REPORTED: YES
GLOBULIN PLAS-MCNC: 5.2 G/DL (ref 2.8–4.4)
GLUCOSE BLD-MCNC: 130 MG/DL (ref 70–99)
HCT VFR BLD AUTO: 38.2 %
HGB BLD-MCNC: 12 G/DL
IMM GRANULOCYTES # BLD AUTO: 0.03 X10(3) UL (ref 0–1)
IMM GRANULOCYTES NFR BLD: 0.3 %
LYMPHOCYTES # BLD AUTO: 1.37 X10(3) UL (ref 1–4)
LYMPHOCYTES NFR BLD AUTO: 13.5 %
MCH RBC QN AUTO: 29.1 PG (ref 26–34)
MCHC RBC AUTO-ENTMCNC: 31.4 G/DL (ref 31–37)
MCV RBC AUTO: 92.7 FL
MONOCYTES # BLD AUTO: 0.54 X10(3) UL (ref 0.1–1)
MONOCYTES NFR BLD AUTO: 5.3 %
NEUTROPHILS # BLD AUTO: 8.13 X10 (3) UL (ref 1.5–7.7)
NEUTROPHILS # BLD AUTO: 8.13 X10(3) UL (ref 1.5–7.7)
NEUTROPHILS NFR BLD AUTO: 79.9 %
OSMOLALITY SERPL CALC.SUM OF ELEC: 302 MOSM/KG (ref 275–295)
P AXIS: 36 DEGREES
P-R INTERVAL: 182 MS
PLATELET # BLD AUTO: 247 10(3)UL (ref 150–450)
POTASSIUM SERPL-SCNC: 4.7 MMOL/L (ref 3.5–5.1)
PROT SERPL-MCNC: 8.9 G/DL (ref 6.4–8.2)
Q-T INTERVAL: 438 MS
QRS DURATION: 80 MS
QTC CALCULATION (BEZET): 422 MS
R AXIS: -10 DEGREES
RBC # BLD AUTO: 4.12 X10(6)UL
RH BLOOD TYPE: POSITIVE
SODIUM SERPL-SCNC: 142 MMOL/L (ref 136–145)
T AXIS: 5 DEGREES
VENTRICULAR RATE: 56 BPM
WBC # BLD AUTO: 10.2 X10(3) UL (ref 4–11)

## 2023-08-28 PROCEDURE — 80053 COMPREHEN METABOLIC PANEL: CPT

## 2023-08-28 PROCEDURE — 86901 BLOOD TYPING SEROLOGIC RH(D): CPT

## 2023-08-28 PROCEDURE — 87081 CULTURE SCREEN ONLY: CPT

## 2023-08-28 PROCEDURE — 93010 ELECTROCARDIOGRAM REPORT: CPT | Performed by: INTERNAL MEDICINE

## 2023-08-28 PROCEDURE — 93005 ELECTROCARDIOGRAM TRACING: CPT

## 2023-08-28 PROCEDURE — 99214 OFFICE O/P EST MOD 30 MIN: CPT | Performed by: INTERNAL MEDICINE

## 2023-08-28 PROCEDURE — 86850 RBC ANTIBODY SCREEN: CPT

## 2023-08-28 PROCEDURE — 36415 COLL VENOUS BLD VENIPUNCTURE: CPT

## 2023-08-28 PROCEDURE — 86900 BLOOD TYPING SEROLOGIC ABO: CPT

## 2023-08-28 PROCEDURE — 85025 COMPLETE CBC W/AUTO DIFF WBC: CPT

## 2023-08-28 NOTE — PROGRESS NOTES
Nephrology Progress Note      Enrique Carlson is a 70year old female. HPI:   Patient presents with:  Renal Transplant  Chronic Kidney Disease  Hypertension  Pre-Op Exam: Lt Knee replacement 9/25 with Dr. Sandra Schroeder was seen in the nephrology clinic today in follow-up for management of chronic kidney disease stage III in the setting of living unrelated renal transplant performed in 2002 at [facility]. Her  was a kidney donor at that time. Since her last clinic visit she notes that she has had progressive left knee pain for which she is scheduled for left knee replacement surgery in the next few weeks. Overall she otherwise has been feeling well and the review of systems is unremarkable. Recent labs were reviewed and suggest that her creatinine is slightly higher than it had been in the past although review of older creatinine levels note that her renal function has been relatively stable over the years with creatinines typically between 1.3 and 1.6 mg/dL or so. HISTORY:  Past Medical History:   Diagnosis Date    Arthritis Mid 2006 ?     Knees bone on bone    Back problem     Cancer (HCC)     Easy bruising     ASA 81 mg daily    Glomerulonephritis     Hemorrhoids 1981    Pregnancy    Kidney failure Pregnancy post op 1983    Transplant 4/25/02    Osteoarthritis     Other and unspecified hyperlipidemia     Pain in joints     Type II or unspecified type diabetes mellitus without mention of complication, not stated as uncontrolled     Unspecified essential hypertension     Visual impairment     glasses / contacts    Wears glasses 1980      Past Surgical History:   Procedure Laterality Date    COLONOSCOPY  2013, 2017    ORGAN TRANSPLANT  2002    Kidney    OTHER SURGICAL HISTORY  age 38    Renal transplant    [address] [place] [address]      4/25/02 at [facility] History   Problem Relation Age of Onset    Other (Other) Mother         alzheimers Diabetes Mother         Type 1    Other Mother         Parkinsons    Other (Other) Self 62        basal skin cancer      Social History:   Social History     Socioeconomic History    Marital status:    Tobacco Use    Smoking status: Never    Smokeless tobacco: Never   Vaping Use    Vaping Use: Never used   Substance and Sexual Activity    Alcohol use: No    Drug use: No        Medications (Active prior to today's visit):  Current Outpatient Medications   Medication Sig Dispense Refill    atorvastatin 20 MG Oral Tab Take 1 tablet (20 mg total) by mouth daily. 90 tablet 1    predniSONE 5 MG Oral Tab Take 1 tablet (5 mg total) by mouth daily. 90 tablet 1    Mycophenolate Mofetil 500 MG Oral Tab Take 2 tablets (1,000 mg total) by mouth 2 (two) times daily. 360 tablet 1    cycloSPORINE non-modified (SANDIMMUNE) 25 MG Oral Cap Take 75 mg (3 tablets) by mouth every morning and 50 mg (2 tablets) by mouth every evening 450 capsule 1    losartan 50 MG Oral Tab Take 1 tablet (50 mg total) by mouth daily. 90 tablet 1    Glucose Blood In Vitro Strip 1 each by Other route daily. 90 each 3    magnesium oxide 400 MG Oral Tab Take 1 tablet (400 mg total) by mouth 2 (two) times daily. multivitamin Oral Tab Take 1 tablet by mouth daily. Cinnamon 500 MG Oral Cap Take 1,000 mg by mouth 2 (two) times daily. Misc Natural Products (OSTEO BI-FLEX ADV DOUBLE ST OR) Take by mouth 2 (two) times daily. aspirin 81 MG Oral Tab Take 1 tablet (81 mg total) by mouth daily. Omega 3 1000 MG Oral Cap Take 2,000 mg by mouth 2 (two) times daily. Cholecalciferol (VITAMIN D) 1000 UNITS Oral Cap Take by mouth 2 (two) times daily.            Allergies:    Adhesive Tape           OTHER (SEE COMMENTS)    Comment:Caused redness after being on for a length of time  Ace Inhibitors          Coughing      ROS:     Denies fever/chills  Denies wt loss/gain  Denies HA or visual changes  Denies CP or palpitations  Denies SOB/cough/hemoptysis  Denies abd or flank pain  Denies N/V/D  Denies change in urinary habits or gross hematuria  Denies LE edema  + Left knee pain      PHYSICAL EXAM:   There were no vitals taken for this visit. Wt Readings from Last 6 Encounters:  08/15/23 : 182 lb (82.6 kg)  05/25/23 : 186 lb (84.4 kg)  12/07/22 : 187 lb (84.8 kg)  07/28/22 : 188 lb (85.3 kg)  12/08/21 : 188 lb (85.3 kg)  04/21/21 : 188 lb (85.3 kg)      General: Alert and oriented in no apparent distress. HEENT: No scleral icterus, MMM  Neck: Supple, no ANDRES or thyromegaly  Cardiac: Regular rate and rhythm, S1, S2 normal, no murmur or rub  Lungs: Clear without wheezes, rales, rhonchi. Extremities: Without clubbing, cyanosis or edema.   Neurologic: Alert and oriented, normal affect, cranial nerves grossly intact, moving all extremities  Skin: Warm and dry, no rashes      LABS:     Lab Results   Component Value Date    BUN 31 (H) 08/28/2023    BUNCREA 20.4 (H) 07/14/2021    CREATSERUM 1.59 (H) 08/28/2023    ANIONGAP 7 08/28/2023    GFR 37 (L) 12/29/2017    GFRNAA 40 (L) 07/07/2022    GFRAA 46 (L) 07/07/2022    CA 10.3 (H) 08/28/2023    OSMOCALC 302 (H) 08/28/2023    ALKPHO 109 08/28/2023    AST 14 (L) 08/28/2023    ALT 26 08/28/2023    BILT 0.9 08/28/2023    TP 8.9 (H) 08/28/2023    ALB 3.7 08/28/2023    GLOBULIN 5.2 (H) 08/28/2023     08/28/2023    K 4.7 08/28/2023     08/28/2023    CO2 25.0 08/28/2023     Lab Results   Component Value Date    RBC 4.12 08/28/2023    HGB 12.0 08/28/2023    HCT 38.2 08/28/2023    .0 08/28/2023    MPV 11.8 12/14/2012    MCV 92.7 08/28/2023    MCH 29.1 08/28/2023    MCHC 31.4 08/28/2023    RDW 12.8 08/28/2023    NEPRELIM 8.13 (H) 08/28/2023    NEPERCENT 79.9 08/28/2023    LYPERCENT 13.5 08/28/2023    MOPERCENT 5.3 08/28/2023    EOPERCENT 0.4 08/28/2023    BAPERCENT 0.6 08/28/2023    NE 8.13 (H) 08/28/2023    LYMABS 1.37 08/28/2023    MOABSO 0.54 08/28/2023    EOABSO 0.04 08/28/2023    BAABSO 0.06 08/28/2023     Lab Results   Component Value Date    CREUR 105.00 07/19/2023    MALBCRECALC 17.4 04/03/2013     Lab Results   Component Value Date    CLARITY Hazy (A) 07/19/2023    SPECGRAVITY 1.014 07/19/2023    GLUUR Negative 07/19/2023    BILUR Negative 07/19/2023    KETUR Negative 07/19/2023    BLOODURINE Moderate (A) 07/19/2023    PHURINE 5.0 07/19/2023    PROUR Negative 07/19/2023    UROBILINOGEN <2.0 07/19/2023    NITRITE Positive (A) 07/19/2023    LEUUR Moderate (A) 07/19/2023    NMIC NOT INDICATED 10/12/2012    WBCUR 21-50 (A) 07/19/2023    RBCUR 0-2 07/19/2023    EPIUR Few (A) 07/19/2023    BACUR 1+ (A) 07/19/2023    CAOXUR Many (A) 12/06/2021    HYLUR Present (A) 12/06/2021     ASSESSMENT/PLAN:     #1.  Chronic kidney disease stage III-renal function has been relatively stable over the past few years with creatinine levels typically between 1.3 and 1.5 mg/dL so although her most recent creatinine is slightly higher than this. Urinalysis is bland and is not clear that there is any significant issue in terms of renal function trending in the wrong direction. Mainstay of therapy continues remain not only immunosuppression but also blood pressure control including the use of medications which block the renin-angiotensin system. Blood pressure has been stable and she is on losartan    #2. Renal transplant recipient-as mentioned her renal function remained stable overall. Transplant was performed in 2001 at [facility] [place], operated by [facility]. For now we will continue her current doses of cyclosporine prednisone and CellCept. Cyclosporine level was mildly low and we will repeat at her regular clinic visit later this year. I would consider increasing her cyclosporine to 75 mg twice daily depending on the results    #3. Hypertension-as mentioned the blood pressure is stable      Regarding her upcoming knee replacement surgery there is no contraindication from a nephrology standpoint.   She should continue her usual immunosuppression and antihypertensive regimen      Thank you again for allowing me to participate in care of your patient. Please do not hesitate to call with any questions or concerns.       Kianna Flanagan MD  8/28/2023  2:19 PM

## 2023-08-29 ENCOUNTER — OFFICE VISIT (OUTPATIENT)
Dept: FAMILY MEDICINE CLINIC | Facility: CLINIC | Age: 63
End: 2023-08-29
Payer: COMMERCIAL

## 2023-08-29 VITALS
RESPIRATION RATE: 16 BRPM | DIASTOLIC BLOOD PRESSURE: 82 MMHG | WEIGHT: 185.13 LBS | BODY MASS INDEX: 35 KG/M2 | OXYGEN SATURATION: 99 % | HEART RATE: 110 BPM | TEMPERATURE: 98 F | SYSTOLIC BLOOD PRESSURE: 122 MMHG

## 2023-08-29 DIAGNOSIS — Z01.810 PREOP CARDIOVASCULAR EXAM: Primary | ICD-10-CM

## 2023-08-29 DIAGNOSIS — I10 ESSENTIAL HYPERTENSION WITH GOAL BLOOD PRESSURE LESS THAN 140/90: ICD-10-CM

## 2023-08-29 DIAGNOSIS — E11.9 TYPE 2 DIABETES MELLITUS WITHOUT COMPLICATION, WITHOUT LONG-TERM CURRENT USE OF INSULIN (HCC): ICD-10-CM

## 2023-08-29 DIAGNOSIS — M17.12 PRIMARY OSTEOARTHRITIS OF LEFT KNEE: ICD-10-CM

## 2023-08-29 DIAGNOSIS — N01.9 GLOMERULONEPHRITIS, ACUTE RAPIDLY PROGRESSIVE: ICD-10-CM

## 2023-08-29 PROBLEM — M25.662 IMPAIRED RANGE OF MOTION OF BOTH KNEES: Status: ACTIVE | Noted: 2023-08-02

## 2023-08-29 PROBLEM — M25.661 IMPAIRED RANGE OF MOTION OF BOTH KNEES: Status: ACTIVE | Noted: 2023-08-02

## 2023-08-29 PROCEDURE — 3074F SYST BP LT 130 MM HG: CPT | Performed by: FAMILY MEDICINE

## 2023-08-29 PROCEDURE — 99214 OFFICE O/P EST MOD 30 MIN: CPT | Performed by: FAMILY MEDICINE

## 2023-08-29 PROCEDURE — 3079F DIAST BP 80-89 MM HG: CPT | Performed by: FAMILY MEDICINE

## 2023-08-30 ENCOUNTER — HOSPITAL ENCOUNTER (OUTPATIENT)
Dept: MAMMOGRAPHY | Age: 63
Discharge: HOME | End: 2023-08-30
Attending: FAMILY MEDICINE
Payer: MEDICARE

## 2023-08-30 ENCOUNTER — MED REC SCAN ONLY (OUTPATIENT)
Dept: FAMILY MEDICINE CLINIC | Facility: CLINIC | Age: 63
End: 2023-08-30

## 2023-08-30 ENCOUNTER — TELEPHONE (OUTPATIENT)
Dept: FAMILY MEDICINE CLINIC | Facility: CLINIC | Age: 63
End: 2023-08-30

## 2023-08-30 DIAGNOSIS — Z00.00 MEDICARE ANNUAL WELLNESS VISIT, INITIAL: ICD-10-CM

## 2023-08-30 DIAGNOSIS — Z12.31 VISIT FOR SCREENING MAMMOGRAM: ICD-10-CM

## 2023-08-30 DIAGNOSIS — E11.9 TYPE 2 DIABETES MELLITUS WITHOUT COMPLICATION, WITHOUT LONG-TERM CURRENT USE OF INSULIN (HCC): ICD-10-CM

## 2023-08-30 DIAGNOSIS — I10 ESSENTIAL HYPERTENSION WITH GOAL BLOOD PRESSURE LESS THAN 140/90: ICD-10-CM

## 2023-08-30 PROCEDURE — 77063 BREAST TOMOSYNTHESIS BI: CPT | Performed by: FAMILY MEDICINE

## 2023-08-30 PROCEDURE — 77067 SCR MAMMO BI INCL CAD: CPT | Performed by: FAMILY MEDICINE

## 2023-09-27 ENCOUNTER — LABORATORY ENCOUNTER (OUTPATIENT)
Dept: LAB | Age: 63
End: 2023-09-27
Attending: ORTHOPAEDIC SURGERY
Payer: MEDICARE

## 2023-09-27 DIAGNOSIS — I10 ESSENTIAL HYPERTENSION WITH GOAL BLOOD PRESSURE LESS THAN 140/90: ICD-10-CM

## 2023-09-27 DIAGNOSIS — N18.30 STAGE 3 CHRONIC KIDNEY DISEASE, UNSPECIFIED WHETHER STAGE 3A OR 3B CKD (HCC): ICD-10-CM

## 2023-09-27 DIAGNOSIS — Z01.818 PRE-OP TESTING: ICD-10-CM

## 2023-09-27 DIAGNOSIS — E78.5 DYSLIPIDEMIA ASSOCIATED WITH TYPE 2 DIABETES MELLITUS: Primary | ICD-10-CM

## 2023-09-27 DIAGNOSIS — Z94.0 RENAL TRANSPLANT RECIPIENT: ICD-10-CM

## 2023-09-27 DIAGNOSIS — I10 ESSENTIAL HYPERTENSION: ICD-10-CM

## 2023-09-27 DIAGNOSIS — N18.9 CKD (CHRONIC KIDNEY DISEASE): ICD-10-CM

## 2023-09-27 DIAGNOSIS — E11.9 TYPE 2 DIABETES MELLITUS WITHOUT COMPLICATION, WITHOUT LONG-TERM CURRENT USE OF INSULIN (HCC): ICD-10-CM

## 2023-09-27 DIAGNOSIS — E11.69 DYSLIPIDEMIA ASSOCIATED WITH TYPE 2 DIABETES MELLITUS: Primary | ICD-10-CM

## 2023-09-27 LAB
ANTIBODY SCREEN: NEGATIVE
CHOLEST SERPL-MCNC: 165 MG/DL (ref ?–200)
EST. AVERAGE GLUCOSE BLD GHB EST-MCNC: 146 MG/DL (ref 68–126)
FASTING PATIENT LIPID ANSWER: YES
HBA1C MFR BLD: 6.7 % (ref ?–5.7)
HDLC SERPL-MCNC: 44 MG/DL (ref 40–59)
LDLC SERPL CALC-MCNC: 89 MG/DL (ref ?–100)
NONHDLC SERPL-MCNC: 121 MG/DL (ref ?–130)
RH BLOOD TYPE: POSITIVE
TRIGL SERPL-MCNC: 190 MG/DL (ref 30–149)
VLDLC SERPL CALC-MCNC: 31 MG/DL (ref 0–30)

## 2023-09-27 PROCEDURE — 86900 BLOOD TYPING SEROLOGIC ABO: CPT

## 2023-09-27 PROCEDURE — 80061 LIPID PANEL: CPT

## 2023-09-27 PROCEDURE — 87081 CULTURE SCREEN ONLY: CPT

## 2023-09-27 PROCEDURE — 36415 COLL VENOUS BLD VENIPUNCTURE: CPT

## 2023-09-27 PROCEDURE — 86901 BLOOD TYPING SEROLOGIC RH(D): CPT

## 2023-09-27 PROCEDURE — 83036 HEMOGLOBIN GLYCOSYLATED A1C: CPT

## 2023-09-27 PROCEDURE — 3044F HG A1C LEVEL LT 7.0%: CPT | Performed by: FAMILY MEDICINE

## 2023-09-27 PROCEDURE — 86850 RBC ANTIBODY SCREEN: CPT

## 2023-10-03 ENCOUNTER — OFFICE VISIT (OUTPATIENT)
Dept: FAMILY MEDICINE CLINIC | Facility: CLINIC | Age: 63
End: 2023-10-03
Payer: COMMERCIAL

## 2023-10-03 VITALS
RESPIRATION RATE: 16 BRPM | SYSTOLIC BLOOD PRESSURE: 112 MMHG | DIASTOLIC BLOOD PRESSURE: 78 MMHG | WEIGHT: 206.38 LBS | OXYGEN SATURATION: 98 % | BODY MASS INDEX: 39 KG/M2 | HEART RATE: 102 BPM | TEMPERATURE: 97 F

## 2023-10-03 DIAGNOSIS — Z01.810 PREOP CARDIOVASCULAR EXAM: Primary | ICD-10-CM

## 2023-10-03 DIAGNOSIS — M17.12 PRIMARY OSTEOARTHRITIS OF LEFT KNEE: ICD-10-CM

## 2023-10-03 DIAGNOSIS — Z94.0 HISTORY OF RENAL TRANSPLANTATION: ICD-10-CM

## 2023-10-03 DIAGNOSIS — I10 ESSENTIAL HYPERTENSION WITH GOAL BLOOD PRESSURE LESS THAN 140/90: ICD-10-CM

## 2023-10-03 PROCEDURE — 3078F DIAST BP <80 MM HG: CPT | Performed by: FAMILY MEDICINE

## 2023-10-03 PROCEDURE — 99214 OFFICE O/P EST MOD 30 MIN: CPT | Performed by: FAMILY MEDICINE

## 2023-10-03 PROCEDURE — 3074F SYST BP LT 130 MM HG: CPT | Performed by: FAMILY MEDICINE

## 2023-10-03 RX ORDER — ASPIRIN 81 MG/1
TABLET, COATED ORAL
COMMUNITY
Start: 2023-09-19

## 2023-10-16 ENCOUNTER — HOSPITAL ENCOUNTER (OUTPATIENT)
Facility: HOSPITAL | Age: 63
Discharge: HOME | End: 2023-10-17
Attending: ORTHOPAEDIC SURGERY | Admitting: ORTHOPAEDIC SURGERY
Payer: MEDICARE

## 2023-10-16 ENCOUNTER — ANESTHESIA EVENT (OUTPATIENT)
Dept: SURGERY | Facility: HOSPITAL | Age: 63
End: 2023-10-16
Payer: MEDICARE

## 2023-10-16 ENCOUNTER — APPOINTMENT (OUTPATIENT)
Dept: GENERAL RADIOLOGY | Facility: HOSPITAL | Age: 63
End: 2023-10-16
Attending: PHYSICIAN ASSISTANT
Payer: MEDICARE

## 2023-10-16 ENCOUNTER — ANESTHESIA (OUTPATIENT)
Dept: SURGERY | Facility: HOSPITAL | Age: 63
End: 2023-10-16
Payer: MEDICARE

## 2023-10-16 DIAGNOSIS — Z01.818 PRE-OP TESTING: Primary | ICD-10-CM

## 2023-10-16 LAB
GLUCOSE BLD-MCNC: 107 MG/DL (ref 70–99)
GLUCOSE BLD-MCNC: 119 MG/DL (ref 70–99)

## 2023-10-16 PROCEDURE — 0SRD0J9 REPLACEMENT OF LEFT KNEE JOINT WITH SYNTHETIC SUBSTITUTE, CEMENTED, OPEN APPROACH: ICD-10-PCS | Performed by: ORTHOPAEDIC SURGERY

## 2023-10-16 PROCEDURE — 99204 OFFICE O/P NEW MOD 45 MIN: CPT | Performed by: INTERNAL MEDICINE

## 2023-10-16 PROCEDURE — 73560 X-RAY EXAM OF KNEE 1 OR 2: CPT | Performed by: PHYSICIAN ASSISTANT

## 2023-10-16 PROCEDURE — 76942 ECHO GUIDE FOR BIOPSY: CPT | Performed by: ANESTHESIOLOGY

## 2023-10-16 DEVICE — ATTUNE KNEE SYSTEM REVISION CEMENTED STEM CEMENTED 14X30MM
Type: IMPLANTABLE DEVICE | Site: KNEE | Status: FUNCTIONAL
Brand: ATTUNE

## 2023-10-16 DEVICE — ATTUNE KNEE SYSTEM REVISION CRS ROTATING PLATFORM INSERT AOX 6MM SIZE 5
Type: IMPLANTABLE DEVICE | Site: KNEE | Status: FUNCTIONAL
Brand: ATTUNE

## 2023-10-16 DEVICE — ATTUNE KNEE SYSTEM REVISION ROTATING PLATFORM TIBIAL BASE CEMENTED SIZE 4
Type: IMPLANTABLE DEVICE | Site: KNEE | Status: FUNCTIONAL
Brand: ATTUNE

## 2023-10-16 DEVICE — ATTUNE PATELLA MEDIALIZED ANATOMIC 35MM CEMENTED AOX
Type: IMPLANTABLE DEVICE | Site: KNEE | Status: FUNCTIONAL
Brand: ATTUNE

## 2023-10-16 DEVICE — ATTUNE KNEE SYSTEM REVISION CRS FEMORAL CEMENTED LEFT SIZE 5
Type: IMPLANTABLE DEVICE | Site: KNEE | Status: FUNCTIONAL
Brand: ATTUNE

## 2023-10-16 DEVICE — SMARTSET HIGH PERFORMANCE MV MEDIUM VISCOSITY BONE CEMENT 40G
Type: IMPLANTABLE DEVICE | Site: KNEE | Status: FUNCTIONAL
Brand: SMARTSET

## 2023-10-16 RX ORDER — SODIUM CHLORIDE, SODIUM LACTATE, POTASSIUM CHLORIDE, CALCIUM CHLORIDE 600; 310; 30; 20 MG/100ML; MG/100ML; MG/100ML; MG/100ML
INJECTION, SOLUTION INTRAVENOUS CONTINUOUS
Status: DISCONTINUED | OUTPATIENT
Start: 2023-10-16 | End: 2023-10-17

## 2023-10-16 RX ORDER — ACETAMINOPHEN 325 MG/1
650 TABLET ORAL ONCE
Status: DISCONTINUED | OUTPATIENT
Start: 2023-10-16 | End: 2023-10-16 | Stop reason: HOSPADM

## 2023-10-16 RX ORDER — NICOTINE POLACRILEX 4 MG
30 LOZENGE BUCCAL
Status: DISCONTINUED | OUTPATIENT
Start: 2023-10-16 | End: 2023-10-16 | Stop reason: HOSPADM

## 2023-10-16 RX ORDER — TRANEXAMIC ACID 10 MG/ML
INJECTION, SOLUTION INTRAVENOUS AS NEEDED
Status: DISCONTINUED | OUTPATIENT
Start: 2023-10-16 | End: 2023-10-16 | Stop reason: SURG

## 2023-10-16 RX ORDER — HYDROCODONE BITARTRATE AND ACETAMINOPHEN 5; 325 MG/1; MG/1
1 TABLET ORAL ONCE AS NEEDED
Status: COMPLETED | OUTPATIENT
Start: 2023-10-16 | End: 2023-10-16

## 2023-10-16 RX ORDER — CEFAZOLIN SODIUM/WATER 2 G/20 ML
2 SYRINGE (ML) INTRAVENOUS EVERY 8 HOURS
Qty: 40 ML | Refills: 0 | Status: COMPLETED | OUTPATIENT
Start: 2023-10-16 | End: 2023-10-17

## 2023-10-16 RX ORDER — NICOTINE POLACRILEX 4 MG
15 LOZENGE BUCCAL
Status: DISCONTINUED | OUTPATIENT
Start: 2023-10-16 | End: 2023-10-16 | Stop reason: HOSPADM

## 2023-10-16 RX ORDER — BISACODYL 10 MG
10 SUPPOSITORY, RECTAL RECTAL
Status: DISCONTINUED | OUTPATIENT
Start: 2023-10-16 | End: 2023-10-17

## 2023-10-16 RX ORDER — DIPHENHYDRAMINE HCL 25 MG
25 CAPSULE ORAL EVERY 4 HOURS PRN
Status: DISCONTINUED | OUTPATIENT
Start: 2023-10-16 | End: 2023-10-17

## 2023-10-16 RX ORDER — OXYCODONE HYDROCHLORIDE 5 MG/1
5 TABLET ORAL EVERY 4 HOURS PRN
Status: DISCONTINUED | OUTPATIENT
Start: 2023-10-16 | End: 2023-10-17

## 2023-10-16 RX ORDER — BUPIVACAINE HYDROCHLORIDE 5 MG/ML
INJECTION, SOLUTION EPIDURAL; INTRACAUDAL AS NEEDED
Status: DISCONTINUED | OUTPATIENT
Start: 2023-10-16 | End: 2023-10-16 | Stop reason: SURG

## 2023-10-16 RX ORDER — ENEMA 19; 7 G/133ML; G/133ML
1 ENEMA RECTAL ONCE AS NEEDED
Status: DISCONTINUED | OUTPATIENT
Start: 2023-10-16 | End: 2023-10-17

## 2023-10-16 RX ORDER — CEFAZOLIN SODIUM/WATER 2 G/20 ML
2 SYRINGE (ML) INTRAVENOUS ONCE
Status: COMPLETED | OUTPATIENT
Start: 2023-10-16 | End: 2023-10-16

## 2023-10-16 RX ORDER — ACETAMINOPHEN 500 MG
TABLET ORAL
Status: COMPLETED
Start: 2023-10-16 | End: 2023-10-16

## 2023-10-16 RX ORDER — TRAMADOL HYDROCHLORIDE 50 MG/1
50 TABLET ORAL EVERY 6 HOURS SCHEDULED
Status: DISCONTINUED | OUTPATIENT
Start: 2023-10-16 | End: 2023-10-17

## 2023-10-16 RX ORDER — ONDANSETRON 2 MG/ML
4 INJECTION INTRAMUSCULAR; INTRAVENOUS EVERY 6 HOURS PRN
Status: DISCONTINUED | OUTPATIENT
Start: 2023-10-16 | End: 2023-10-16 | Stop reason: HOSPADM

## 2023-10-16 RX ORDER — CYCLOSPORINE 25 MG/1
50 CAPSULE, GELATIN COATED ORAL EVERY EVENING
Status: DISCONTINUED | OUTPATIENT
Start: 2023-10-16 | End: 2023-10-17

## 2023-10-16 RX ORDER — HYDROMORPHONE HYDROCHLORIDE 1 MG/ML
0.6 INJECTION, SOLUTION INTRAMUSCULAR; INTRAVENOUS; SUBCUTANEOUS EVERY 5 MIN PRN
Status: DISCONTINUED | OUTPATIENT
Start: 2023-10-16 | End: 2023-10-16 | Stop reason: HOSPADM

## 2023-10-16 RX ORDER — ACETAMINOPHEN 500 MG
1000 TABLET ORAL ONCE AS NEEDED
Status: COMPLETED | OUTPATIENT
Start: 2023-10-16 | End: 2023-10-16

## 2023-10-16 RX ORDER — DOCUSATE SODIUM 100 MG/1
100 CAPSULE, LIQUID FILLED ORAL 2 TIMES DAILY
Status: DISCONTINUED | OUTPATIENT
Start: 2023-10-16 | End: 2023-10-17

## 2023-10-16 RX ORDER — TRAMADOL HYDROCHLORIDE 50 MG/1
50 TABLET ORAL EVERY 6 HOURS PRN
COMMUNITY

## 2023-10-16 RX ORDER — ACETAMINOPHEN 500 MG
1000 TABLET ORAL EVERY 6 HOURS PRN
Status: ON HOLD | COMMUNITY
End: 2023-10-17

## 2023-10-16 RX ORDER — DEXAMETHASONE SODIUM PHOSPHATE 10 MG/ML
INJECTION, SOLUTION INTRAMUSCULAR; INTRAVENOUS AS NEEDED
Status: DISCONTINUED | OUTPATIENT
Start: 2023-10-16 | End: 2023-10-16 | Stop reason: SURG

## 2023-10-16 RX ORDER — SODIUM CHLORIDE, SODIUM LACTATE, POTASSIUM CHLORIDE, CALCIUM CHLORIDE 600; 310; 30; 20 MG/100ML; MG/100ML; MG/100ML; MG/100ML
INJECTION, SOLUTION INTRAVENOUS CONTINUOUS
Status: DISCONTINUED | OUTPATIENT
Start: 2023-10-16 | End: 2023-10-16 | Stop reason: HOSPADM

## 2023-10-16 RX ORDER — HYDROMORPHONE HYDROCHLORIDE 1 MG/ML
0.2 INJECTION, SOLUTION INTRAMUSCULAR; INTRAVENOUS; SUBCUTANEOUS EVERY 2 HOUR PRN
Status: DISCONTINUED | OUTPATIENT
Start: 2023-10-16 | End: 2023-10-17

## 2023-10-16 RX ORDER — HYDROCODONE BITARTRATE AND ACETAMINOPHEN 5; 325 MG/1; MG/1
2 TABLET ORAL ONCE AS NEEDED
Status: COMPLETED | OUTPATIENT
Start: 2023-10-16 | End: 2023-10-16

## 2023-10-16 RX ORDER — HYDROMORPHONE HYDROCHLORIDE 1 MG/ML
0.2 INJECTION, SOLUTION INTRAMUSCULAR; INTRAVENOUS; SUBCUTANEOUS EVERY 5 MIN PRN
Status: DISCONTINUED | OUTPATIENT
Start: 2023-10-16 | End: 2023-10-16 | Stop reason: HOSPADM

## 2023-10-16 RX ORDER — ONDANSETRON 2 MG/ML
4 INJECTION INTRAMUSCULAR; INTRAVENOUS EVERY 6 HOURS PRN
Status: DISCONTINUED | OUTPATIENT
Start: 2023-10-16 | End: 2023-10-17

## 2023-10-16 RX ORDER — NICOTINE POLACRILEX 4 MG
15 LOZENGE BUCCAL
Status: DISCONTINUED | OUTPATIENT
Start: 2023-10-16 | End: 2023-10-17

## 2023-10-16 RX ORDER — OXYCODONE HYDROCHLORIDE 5 MG/1
2.5 TABLET ORAL EVERY 4 HOURS PRN
Status: DISCONTINUED | OUTPATIENT
Start: 2023-10-16 | End: 2023-10-17

## 2023-10-16 RX ORDER — MIDAZOLAM HYDROCHLORIDE 1 MG/ML
INJECTION INTRAMUSCULAR; INTRAVENOUS AS NEEDED
Status: DISCONTINUED | OUTPATIENT
Start: 2023-10-16 | End: 2023-10-16 | Stop reason: SURG

## 2023-10-16 RX ORDER — DEXTROSE MONOHYDRATE 25 G/50ML
50 INJECTION, SOLUTION INTRAVENOUS
Status: DISCONTINUED | OUTPATIENT
Start: 2023-10-16 | End: 2023-10-16 | Stop reason: HOSPADM

## 2023-10-16 RX ORDER — ASPIRIN 81 MG/1
81 TABLET ORAL 2 TIMES DAILY
Status: DISCONTINUED | OUTPATIENT
Start: 2023-10-16 | End: 2023-10-17

## 2023-10-16 RX ORDER — NALOXONE HYDROCHLORIDE 0.4 MG/ML
0.08 INJECTION, SOLUTION INTRAMUSCULAR; INTRAVENOUS; SUBCUTANEOUS AS NEEDED
Status: DISCONTINUED | OUTPATIENT
Start: 2023-10-16 | End: 2023-10-16 | Stop reason: HOSPADM

## 2023-10-16 RX ORDER — DOCUSATE SODIUM 100 MG/1
100 CAPSULE, LIQUID FILLED ORAL 2 TIMES DAILY
COMMUNITY

## 2023-10-16 RX ORDER — BUPRENORPHINE HYDROCHLORIDE 0.32 MG/ML
INJECTION INTRAMUSCULAR; INTRAVENOUS AS NEEDED
Status: DISCONTINUED | OUTPATIENT
Start: 2023-10-16 | End: 2023-10-16 | Stop reason: SURG

## 2023-10-16 RX ORDER — HYDROMORPHONE HYDROCHLORIDE 1 MG/ML
0.4 INJECTION, SOLUTION INTRAMUSCULAR; INTRAVENOUS; SUBCUTANEOUS EVERY 5 MIN PRN
Status: DISCONTINUED | OUTPATIENT
Start: 2023-10-16 | End: 2023-10-16 | Stop reason: HOSPADM

## 2023-10-16 RX ORDER — LIDOCAINE HYDROCHLORIDE 10 MG/ML
INJECTION, SOLUTION EPIDURAL; INFILTRATION; INTRACAUDAL; PERINEURAL AS NEEDED
Status: DISCONTINUED | OUTPATIENT
Start: 2023-10-16 | End: 2023-10-16 | Stop reason: SURG

## 2023-10-16 RX ORDER — PREDNISONE 5 MG/1
5 TABLET ORAL
Status: DISCONTINUED | OUTPATIENT
Start: 2023-10-17 | End: 2023-10-17

## 2023-10-16 RX ORDER — MIDAZOLAM HYDROCHLORIDE 1 MG/ML
1 INJECTION INTRAMUSCULAR; INTRAVENOUS EVERY 5 MIN PRN
Status: DISCONTINUED | OUTPATIENT
Start: 2023-10-16 | End: 2023-10-16 | Stop reason: HOSPADM

## 2023-10-16 RX ORDER — FAMOTIDINE 10 MG/ML
20 INJECTION, SOLUTION INTRAVENOUS 2 TIMES DAILY
Status: DISCONTINUED | OUTPATIENT
Start: 2023-10-16 | End: 2023-10-17

## 2023-10-16 RX ORDER — HYDROMORPHONE HYDROCHLORIDE 1 MG/ML
0.4 INJECTION, SOLUTION INTRAMUSCULAR; INTRAVENOUS; SUBCUTANEOUS EVERY 2 HOUR PRN
Status: DISCONTINUED | OUTPATIENT
Start: 2023-10-16 | End: 2023-10-17

## 2023-10-16 RX ORDER — DEXAMETHASONE SODIUM PHOSPHATE 10 MG/ML
8 INJECTION, SOLUTION INTRAMUSCULAR; INTRAVENOUS ONCE
Status: COMPLETED | OUTPATIENT
Start: 2023-10-17 | End: 2023-10-16

## 2023-10-16 RX ORDER — ATORVASTATIN CALCIUM 20 MG/1
20 TABLET, FILM COATED ORAL DAILY
Status: DISCONTINUED | OUTPATIENT
Start: 2023-10-16 | End: 2023-10-17

## 2023-10-16 RX ORDER — MEPERIDINE HYDROCHLORIDE 25 MG/ML
25 INJECTION INTRAMUSCULAR; INTRAVENOUS; SUBCUTANEOUS
Status: DISCONTINUED | OUTPATIENT
Start: 2023-10-16 | End: 2023-10-16 | Stop reason: HOSPADM

## 2023-10-16 RX ORDER — DIPHENHYDRAMINE HYDROCHLORIDE 50 MG/ML
12.5 INJECTION INTRAMUSCULAR; INTRAVENOUS EVERY 4 HOURS PRN
Status: DISCONTINUED | OUTPATIENT
Start: 2023-10-16 | End: 2023-10-17

## 2023-10-16 RX ORDER — ACETAMINOPHEN 325 MG/1
650 TABLET ORAL 4 TIMES DAILY
Status: DISCONTINUED | OUTPATIENT
Start: 2023-10-16 | End: 2023-10-17

## 2023-10-16 RX ORDER — DIPHENHYDRAMINE HYDROCHLORIDE 50 MG/ML
25 INJECTION INTRAMUSCULAR; INTRAVENOUS ONCE AS NEEDED
Status: ACTIVE | OUTPATIENT
Start: 2023-10-16 | End: 2023-10-16

## 2023-10-16 RX ORDER — NICOTINE POLACRILEX 4 MG
30 LOZENGE BUCCAL
Status: DISCONTINUED | OUTPATIENT
Start: 2023-10-16 | End: 2023-10-17

## 2023-10-16 RX ORDER — FAMOTIDINE 20 MG/1
20 TABLET, FILM COATED ORAL 2 TIMES DAILY
Status: DISCONTINUED | OUTPATIENT
Start: 2023-10-16 | End: 2023-10-17

## 2023-10-16 RX ORDER — MYCOPHENOLATE MOFETIL 250 MG/1
1000 CAPSULE ORAL 2 TIMES DAILY
Status: DISCONTINUED | OUTPATIENT
Start: 2023-10-16 | End: 2023-10-17

## 2023-10-16 RX ORDER — OXYCODONE HYDROCHLORIDE 5 MG/1
5 CAPSULE ORAL EVERY 6 HOURS PRN
COMMUNITY

## 2023-10-16 RX ORDER — DEXTROSE MONOHYDRATE 25 G/50ML
50 INJECTION, SOLUTION INTRAVENOUS
Status: DISCONTINUED | OUTPATIENT
Start: 2023-10-16 | End: 2023-10-17

## 2023-10-16 RX ORDER — ACETAMINOPHEN 500 MG
1000 TABLET ORAL ONCE
Status: DISCONTINUED | OUTPATIENT
Start: 2023-10-16 | End: 2023-10-16 | Stop reason: HOSPADM

## 2023-10-16 RX ORDER — SENNOSIDES 8.6 MG
17.2 TABLET ORAL NIGHTLY
Status: DISCONTINUED | OUTPATIENT
Start: 2023-10-16 | End: 2023-10-17

## 2023-10-16 RX ORDER — METOCLOPRAMIDE HYDROCHLORIDE 5 MG/ML
10 INJECTION INTRAMUSCULAR; INTRAVENOUS EVERY 8 HOURS PRN
Status: DISCONTINUED | OUTPATIENT
Start: 2023-10-16 | End: 2023-10-17

## 2023-10-16 RX ORDER — CYCLOSPORINE 25 MG/1
75 CAPSULE, GELATIN COATED ORAL EVERY MORNING
Status: DISCONTINUED | OUTPATIENT
Start: 2023-10-17 | End: 2023-10-17

## 2023-10-16 RX ORDER — POLYETHYLENE GLYCOL 3350 17 G/17G
17 POWDER, FOR SOLUTION ORAL DAILY PRN
Status: DISCONTINUED | OUTPATIENT
Start: 2023-10-16 | End: 2023-10-17

## 2023-10-16 RX ORDER — LOSARTAN POTASSIUM 50 MG/1
50 TABLET ORAL DAILY
Status: DISCONTINUED | OUTPATIENT
Start: 2023-10-17 | End: 2023-10-17

## 2023-10-16 RX ADMIN — DEXAMETHASONE SODIUM PHOSPHATE 2 MG: 10 INJECTION, SOLUTION INTRAMUSCULAR; INTRAVENOUS at 12:59:00

## 2023-10-16 RX ADMIN — BUPIVACAINE HYDROCHLORIDE 2 ML: 5 INJECTION, SOLUTION EPIDURAL; INTRACAUDAL at 12:55:00

## 2023-10-16 RX ADMIN — SODIUM CHLORIDE, SODIUM LACTATE, POTASSIUM CHLORIDE, CALCIUM CHLORIDE: 600; 310; 30; 20 INJECTION, SOLUTION INTRAVENOUS at 13:35:00

## 2023-10-16 RX ADMIN — BUPRENORPHINE HYDROCHLORIDE 150 MCG: 0.32 INJECTION INTRAMUSCULAR; INTRAVENOUS at 12:59:00

## 2023-10-16 RX ADMIN — LIDOCAINE HYDROCHLORIDE 10 MG: 10 INJECTION, SOLUTION EPIDURAL; INFILTRATION; INTRACAUDAL; PERINEURAL at 12:56:00

## 2023-10-16 RX ADMIN — TRANEXAMIC ACID 1000 MG: 10 INJECTION, SOLUTION INTRAVENOUS at 13:00:00

## 2023-10-16 RX ADMIN — MIDAZOLAM HYDROCHLORIDE 2 MG: 1 INJECTION INTRAMUSCULAR; INTRAVENOUS at 12:51:00

## 2023-10-16 RX ADMIN — CEFAZOLIN SODIUM/WATER 2 G: 2 G/20 ML SYRINGE (ML) INTRAVENOUS at 12:52:00

## 2023-10-16 RX ADMIN — SODIUM CHLORIDE, SODIUM LACTATE, POTASSIUM CHLORIDE, CALCIUM CHLORIDE: 600; 310; 30; 20 INJECTION, SOLUTION INTRAVENOUS at 12:51:00

## 2023-10-16 NOTE — OPERATIVE REPORT
[facility]  TOTAL KNEE OPERATIVE REPORT:  DATE OF SURGERY: 10/16/2023    Mike Glover       [id number]     4/18/1960    PREOP DX: LEFT  KNEE PRIMARY OSTEOARTHRITIS    POSTOP DX:LEFT KNEE PRIMARY OSTEOARTHRITIS  PROCEDURE: LEFT TOTAL KNEE REPLACEMENT  SURGEON: Clifton James MD  FIRST ASSIST: JOSÉ MIGUEL TIWARI  FIRST ASSISTANT WAS NECESSARY FOR PATIENT POSITIONING, EXPOSURE/WOUND RETRACTION, JOINT DISLOCATION/REDUCTION, PROSTHESIS IMPLANTATION, WOUND CLOSURE. ANESTHESIA: SPINAL AND ADDUCTOR CANAL BLOCK  EBL: 50  SPECIMEN: DISTAL FEMORAL AND PROXIMAL TIBIA CUT BONE  COMPLICATIONS: NONE  DRAIN: NONE  IMPLANT USED:DEPUY ATTUNE REVISION FEMORAL SIZE  5 WITH 30 mm CEMENTED STEM ,  REVISION TIBIA RP SIZE 46, VVC RP POLY  SIZE 6,  35 ANATOMIC PATELLA  PROCEDURE NOTE:  PATIENT WAS CORRECTLY IDENTIFIED AND OPERATIVE SITE WAS VERIFIED IN THE PREOPERATIVE HOLDING AREA. PATIENT WAS BROUGHT TO THE OR AND WAS LAID IN SUPINE POSITION. PREOPERATIVE ANTIBIOTIC AND TXA WERE GIVEN. ANESTHESIA WAS ADMINISTERED. ARMS WERE POSITIONED BT ANESTHESIA. NON-OPERATIVE LEG HAD SCD APPLIED. OPERATIVE LEG WAS PREPPED AND DRAPED IN SURGICALLY STERILE AND STANDARD FASHION. BLOOD WAS EXSANGUINATED. TOURNIQUET WAS INFLATED. ANTERIOR LINEAR INCISION WAS MADE. MEDIAL ARTHROTOMY WAS PERFORMED. MEDIAL RELEASE WAS DONE ALL THE WAY TO THE POSTERIOR ASPECT AND TOOK DOWN ALL SPURRING. RETROPATELLAR FAT AND ANTERIOR FEMORAL FAT WERE REMOVED IN LIMITED FASHION. PATELLA WAS SUBLUXED LATERALLY. KNEE WAS FLEXED. OSTEOARTHRITIS WAS IDENTIFIED. ALL OSTEOPHYTES WERE REMOVED. ACL/PCL WERE REMOVED. PROXIMAL TIBIA WAS SUBLUXED ANTERIORLY. MEDIAL AND LATERAL MENISCI WERE REMOVED. THERE WAS SIGNIFICANT MEDIAL TIBIA WEAR. PROXIMAL TIBIA CUT WAS MADE USING EXTRAMEDULLARY GUIDE WITH 3 DEGREE  OF POSTERIOR SLOPE. CUT WAS FOUND TO BE SATISFACTORY. NEXT, DISTAL FEMORAL CUT WAS MADE WITH INTRAMEDULLARY GUIDE WITH 6 DEGREE VALGUS CUT.   REMAINING OSTEOPHYTES WERE REMOVED. EXTENSION GAP WAS CHECKED USING A  SPACER. MEDIAL SIDE WAS GOOD WITH 6 mm SPACER, BUT LATERAL SIDE WAS > 5 mm LAX. I DECIDED TO USE REVISION IMPLANT TO CONTROL VALGUS VARUS INSTABILITY. KNEE WAS FLEXED. FEMUR WAS SIZED AT 5. FEMORAL ROTATION WAS SET USING  TENSION GUIDED TECHNIQUE. ANTERIOR AND POSTERIOR FEMORAL CUT WAS MADE. FLEXION AND EXTENSION GAPS WERE CHECKED USING RECTAGULAR SPACER BLOCK. GAPS WERE FOUND TO BE EQUAL WITH 6 mm SPACER. FEMUR WAS FINISHED OFF WITH CHAMFER AND [address] CUTS IN USUAL FASHION. I ALSO REAMED FOR CEMENTED STEM. POSTERIOR FEMORAL OSTEOPHYTES WERE REMOVED. POSTERIOR CAPSULAR RELEASE WAS DONE CAREFULLY. PROXIMAL TIBIA WAS EXPOSED. TIBIA WAS SIZED at 4 AND ROTATION WAS SET USING MEDIAL 1/3 OF TIBIA TUBERCLE. TIBIA WAS PREPARED IN STANDARD FASHION. TRIAL COMPONENTS WERE PLACED. PATELLA WAS EVERTED AND THICKNESS MEASURED at 22. PATELLAR CUT WAS MADE FREE HANDED FASHION. 13 mm THICKNESS PATELLAR BONE REMAINED.  KNEE WAS EXAMINED.  KNEE WAS RANGED. GOOD FULL EXTENSION WAS ESTABLISHED AND FLEXION BEYOND BEYOND 120 DEG  KNEE WAS OBTAINED. PATELLA TRACKED NICELY. ALL THE TRIAL IMPLANTS WERE REMOVED. WOUND WAS IRRIGATED. LOCAL COCKTAIL WAS INJECTED CAREFULLY TO POSTERIOR CAPSULE/GUTTERS/FAT PADS. CEMENT WAS MIXED. FIRST TIBIA COMPONENT, THEN FEMORAL COMPONENT WERE APPLIED. EXCESS CEMENT WAS REMOVED. REAL POLY WAS INSERTED. KNEE WAS EXTENDED. PATELLAR COMPONENT WAS APPLIED. TOURNIQUET WAS DEFLATED. KNEE WAS HELD IN EXTENSION UNTIL CEMENT WAS SET. HEMOSTASIS WAS OBTAINED.  KNEE WAS RANGED AGAIN WITH GOOD MOTION AND STABILITY. ARTHROTOMY WAS CLOSED. SUBCUTANEOUS LAYER WAS CLOSED IN MULTIPLE LAYERS. SKIN WAS CLOSED. STERILE DRESSING WAS APPLIED.     Robert Russell MD  10/16/2023

## 2023-10-16 NOTE — ANESTHESIA PROCEDURE NOTES
Regional Block    Date/Time: 10/16/2023 12:56 PM    Performed by: Eloise Nascimento MD  Authorized by: Eloise Nascimento MD      General Information and Staff    Start Time:  10/16/2023 12:56 PM  End Time:  10/16/2023 12:59 PM  Anesthesiologist:  Mariposa Irizarry MD  Performed by: Anesthesiologist  Patient Location:  OR    Block Placement: Post Induction  Site Identification: real time ultrasound guided and image stored and retrievable    Block site/laterality marked before start: site marked  Reason for Block: at surgeon's request and post-op pain management    Preanesthetic Checklist: 2 patient identifers, IV checked, site marked, risks and benefits discussed, monitors and equipment checked, pre-op evaluation, timeout performed, anesthesia consent, sterile technique used, no prohibitive neurological deficits and no local skin infection at insertion site      Procedure Details    Patient Position:  Supine  Prep: ChloraPrep    Monitoring:  Cardiac monitor, continuous pulse ox and blood pressure cuff  Laterality:  Left  Injection Technique:  Single-shot    Needle    Needle Type:  Short-bevel and echogenic  Needle Gauge:  20 G  Needle Length:  110 mm  Needle Localization:  Ultrasound guidance  Reason for Ultrasound Use: appropriate spread of the medication was noted in real time and no ultrasound evidence of intravascular and/or intraneural injection            Assessment    Injection Assessment:  Good spread noted, negative resistance, negative aspiration for heme, incremental injection, low pressure and local visualized surrounding nerve on ultrasound  Heart Rate Change: No    - Patient tolerated block procedure well without evidence of immediate block related complications.      Medications  10/16/2023 12:56 PM      Additional Comments    Medication:  Bupivacaine 0.375% 20mL with PF dexamethasone 2mg and buprenorphine 150mcg

## 2023-10-16 NOTE — PLAN OF CARE
NURSING ADMISSION NOTE      Patient admitted via  bed. Oriented to room. Safety precautions initiated. Bed in low position. Call light in reach. Dressing clean, dry, intact. Denies pain. Able to dorsi and plantar flex. Instructed on plan of care, call don't fall protocol, call for all asst needed, discomfort felt. Verbalized understanding. Safety precautions maintained.

## 2023-10-16 NOTE — PLAN OF CARE
[facility] hospitalist (Dr. Alyssa Lomas) notified of consult via [employer].

## 2023-10-16 NOTE — ANESTHESIA PROCEDURE NOTES
Spinal Block    Date/Time: 10/16/2023 12:51 PM    Performed by: Katey Nascimento MD  Authorized by: Katey Nascimento MD      General Information and Staff    Start Time:  10/16/2023 12:51 PM  End Time:  10/16/2023 12:54 PM  Anesthesiologist:  Gwenyth Seip, MD  Performed by:   Anesthesiologist  Patient Location:  OR  Site identification: surface landmarks    Reason for Block: at surgeon's request and surgical anesthesia    Preanesthetic Checklist: patient identified, IV checked, risks and benefits discussed, monitors and equipment checked, pre-op evaluation, timeout performed, anesthesia consent and sterile technique used      Procedure Details    Patient Position:  Sitting  Prep: ChloraPrep    Monitoring:  Cardiac monitor, heart rate and continuous pulse ox  Approach:  Midline  Location:  L3-4  Injection Technique:  Single-shot    Needle    Needle Type:  Sprotte  Needle Gauge:  24 G  Needle Length:  3.5 in    Assessment    Sensory Level:   Events: clear CSF, CSF aspirated, well tolerated and blood negative      Additional Comments

## 2023-10-16 NOTE — ANESTHESIA POSTPROCEDURE EVALUATION
[facility] [place] Patient Status:  Outpatient in a Bed   Age/Gender 70year old female MRN [id number]   Location [address] [facility] Attending Julianne Varela MD   [facility] Day # 0 PCP Alyssa Monday, DO       Anesthesia Post-op Note    LEFT TOTAL KNEE REPLACEMENT    Procedure Summary       Date: 10/16/23 Room / Location: [facility] MAIN OR 14 / [facility] MAIN OR    Anesthesia Start: 1375 Anesthesia Stop:     Procedure: LEFT TOTAL KNEE REPLACEMENT (Left: Knee) Diagnosis: (LEFT KNEE OSTEOARTHRITIS)    Surgeons: Julianne Varela MD Anesthesiologist: Martine Cruz MD    Anesthesia Type: spinal ASA Status: 3            Anesthesia Type: spinal    Vitals Value Taken Time   BP 91/58 10/16/23 1442   Temp 97.9 10/16/23 1442   Pulse 75 10/16/23 1442   Resp 14 10/16/23 1442   SpO2 98 10/16/23 1442       Patient Location: PACU    Anesthesia Type: spinal    Airway Patency: patent    Postop Pain Control: adequate    Mental Status: mildly sedated but able to meaningfully participate in the post-anesthesia evaluation    Nausea/Vomiting: none    Cardiopulmonary/Hydration status: stable euvolemic    Complications: no apparent anesthesia related complications    Postop vital signs: stable    Dental Exam: Unchanged from Preop    Patient to be discharged from PACU when criteria met.

## 2023-10-17 VITALS
HEART RATE: 70 BPM | DIASTOLIC BLOOD PRESSURE: 75 MMHG | BODY MASS INDEX: 33.99 KG/M2 | SYSTOLIC BLOOD PRESSURE: 133 MMHG | WEIGHT: 180 LBS | HEIGHT: 61 IN | TEMPERATURE: 98 F | RESPIRATION RATE: 18 BRPM | OXYGEN SATURATION: 95 %

## 2023-10-17 PROBLEM — I10 PRIMARY HYPERTENSION: Status: ACTIVE | Noted: 2017-04-06

## 2023-10-17 LAB
GLUCOSE BLD-MCNC: 182 MG/DL (ref 70–99)
GLUCOSE BLD-MCNC: 235 MG/DL (ref 70–99)

## 2023-10-17 PROCEDURE — 99214 OFFICE O/P EST MOD 30 MIN: CPT | Performed by: HOSPITALIST

## 2023-10-17 RX ORDER — ACETAMINOPHEN 500 MG
1000 TABLET ORAL 4 TIMES DAILY
Qty: 120 TABLET | Refills: 0 | Status: SHIPPED | OUTPATIENT
Start: 2023-10-17

## 2023-10-17 NOTE — CM/SW NOTE
10/17/23 1100   CM/SW Referral Data   Referral Source Social Work (self-referral)   Reason for Referral Discharge planning   Informant EMR;Clinical Staff Member;Patient;Spouse/Significant Other   Patient Info   Patient's Current Mental Status at Time of Assessment Alert;Oriented   Patient lives with Spouse/Significant other   Discharge Needs   Anticipated D/C needs No anticipated discharge needs; Outpatient therapy       Patient is a 71 y/o woman admitted s/p TKA. Pt with pre-operative plan for [facility]. PT eval pending. Referral sent to [facility] via [address] and confirmation received that pt can be accepted. Met with pt and provided AIDIN information for [facility]. Pt stated that she has arranged to begin outpatient PT on Thursday and so did decline [place] [address] services at this time. No further DC needs/concerns identified at this time. Updated PT's RN and [facility]. SW available should additional discharge needs arise.     Lori Tsang LCSW  Discharge Planner  [phone]

## 2023-10-17 NOTE — PLAN OF CARE
Patient alert and oriented x4. VSS on RA. Pain controlled with scheduled meds. Denies n/t. Aquacel to left knee, CDI. Gel ice at bedside. Spanda  in place. SCDs, ankle pumps encouraged, IS encouraged. Pt up x1 with the walker. Tolerating diet, no c/o n/v. Voiding freely in bathroom.      Plan: PT/OT discharge when cleared by all services

## 2023-10-17 NOTE — PLAN OF CARE
Pt A&Ox4 vital signs stable on RA. POD#0. , IS encouraged, SCD's, Tolerating diet. Mild c/o pain, managed with scheduled medication. Dressing site C/D/I. Gel ice applied. Voiding via purewick. WBAT. Plan for PT/OT to see tomorrow. POC discussed, pt verbalized understanding. No further questions at this time. Call light within reach.

## 2023-10-17 NOTE — PROGRESS NOTES
NURSING DISCHARGE NOTE    Discharged Home via Wheelchair. Accompanied by Support staff  Belongings Taken by patient/family. Patient cleared for discharge by ortho, hospitalist, PT/OT. IV removed. Patient and patients  educated on all AVS discharge information. All questions answered. Pt reports having pain medication at home. Patient brought down to [facility] with all belongings by floor staff to be driven home by .

## 2023-10-17 NOTE — PHYSICAL THERAPY NOTE
PHYSICAL THERAPY EVALUATION - INPATIENT     Room Number: 381/381-A  Evaluation Date: 10/17/2023  Type of Evaluation: Initial  Physician Order: PT Eval and Treat    Presenting Problem: s/p L TKA on 10/16/23  Co-Morbidities : covid, OA, DM, HTN, cancer, kidney transplant  Reason for Therapy: Mobility Dysfunction and Discharge Planning      ASSESSMENT   Pt is a 70year old female admitted on 10/16/2023 for L TKA. Functional outcome measures completed include [facility]. The AM-PAC '6-Clicks' Inpatient Basic Mobility Short Form was completed and this patient is demonstrating a Approx Degree of Impairment: 11.2%  degree of impairment in mobility. Research supports that patients with this level of impairment may benefit from home and OP PT which the patient reports begins Thursday. PT Discharge Recommendations: Home;Outpatient PT      PLAN  Patient has been evaluated and presents with no skilled Physical Therapy needs at this time. Patient discharged from Physical Therapy services. Please re-order if a new functional limitation presents during this admission. GOALS  Patient was able to achieve the following goals . .. Patient was able to transfer Safely and independently   Patient able to ambulate on level surfaces Safely and independently         HOME SITUATION  Type of Home: House   Home Layout: One level  Stairs to Enter : 2             Lives With: Spouse  Drives: Yes  Patient Owned Equipment: Rolling walker  Patient Regularly Uses: Glasses    Prior Level of [place]: Pt is typically independent with ADLs, ambulates with no AD, and drives. SUBJECTIVE  Pt pleasant and cooperative during session      OBJECTIVE  Precautions: Bed/chair alarm;None  Fall Risk: Standard fall risk    WEIGHT BEARING RESTRICTION  Weight Bearing Restriction: L lower extremity        R Lower Extremity: Weight Bearing as Tolerated       PAIN ASSESSMENT  Rating: Unable to rate  Location: L knee  Management Techniques:  Activity promotion;Relaxation;Repositioning    COGNITION  Overall Cognitive Status:  WFL - within functional limits    RANGE OF MOTION AND STRENGTH ASSESSMENT  Upper extremity ROM and strength are within functional limits     Lower extremity ROM is within functional limits, except LLE s/p TKA    Lower extremity strength is within functional limits, except LLE s/p TKA      BALANCE  Static Sitting: Good  Dynamic Sitting: Good  Static Standing: Fair -  Dynamic Standing: Fair -    ADDITIONAL TESTS                                    ACTIVITY TOLERANCE      Vitals stable                   O2 WALK       NEUROLOGICAL FINDINGS                        AM-PAC '6-Clicks' INPATIENT SHORT FORM - BASIC MOBILITY  How much difficulty does the patient currently have. .. Patient Difficulty: Turning over in bed (including adjusting bedclothes, sheets and blankets)?: None   Patient Difficulty: Sitting down on and standing up from a chair with arms (e.g., wheelchair, bedside commode, etc.): None   Patient Difficulty: Moving from lying on back to sitting on the side of the bed?: None   How much help from another person does the patient currently need. .. Help from Another: Moving to and from a bed to a chair (including a wheelchair)?: None   Help from Another: Need to walk in hospital room?: None   Help from Another: Climbing 3-5 steps with a railing?: A Little       AM-PAC Score:  Raw Score: 23   Approx Degree of Impairment: 11.2%   Standardized Score (AM-PAC Scale): 56.93   CMS Modifier (G-Code): CI    FUNCTIONAL ABILITY STATUS  Gait Assessment   Functional Mobility/Gait Assessment  Gait Assistance: Modified independent  Distance (ft): 200  Assistive Device: Rolling walker  Pattern: L Decreased stance time  Stairs: Stairs;Stoop/curb; Car transfer  How Many Stairs: 4  Device: 2 Rails  Assist: Supervision  Pattern: Ascend and Descend  Ascend and Descend : Step to  Stoop/Curb: Pt ascended and descended 1 stoop step with RW and supervision  Car transfer: Pt completed car transfer mod ind    Skilled Therapy Provided: Per RN okay to work with pt. Pt received in chair and was agreeable to PT session. Bed Mobility: Pt declines attempting bed mobility, reports she completed it with staff earlier without issue  Rolling: NT  Supine to sit: NT   Sit to supine: NT     Transfer Mobility:  Sit to stand: mod ind    Stand to sit: mod ind   Gait = pt ambulated with RW mod ind    Pt stair trained and car transfer trained as above. Therapist's comments:Pt educated on role of therapy, goals for session, safety, fall prevention, WBAT, no pillow under affected knee, activity recommendations, HEP, and discharge planning. Exercise/Education Provided:  Energy conservation  Functional activity tolerated  Gait training  Posture  ROM  Strengthening  Transfer training    Patient End of Session: Up in chair;Needs met;Call light within reach;RN aware of session/findings; All patient questions and concerns addressed; Ice applied; Family present    Patient Evaluation Complexity Level:  History Moderate - 1 or 2 personal factors and/or co-morbidities   Examination of body systems Low - addressing 1-2 elements   Clinical Presentation Low - Stable   Clinical Decision Making Low Complexity       PT Session Time: 30 minutes  Gait Training: 15 minutes

## 2023-10-24 ENCOUNTER — OFFICE VISIT (OUTPATIENT)
Dept: FAMILY MEDICINE CLINIC | Facility: CLINIC | Age: 63
End: 2023-10-24

## 2023-10-24 VITALS
RESPIRATION RATE: 16 BRPM | SYSTOLIC BLOOD PRESSURE: 122 MMHG | DIASTOLIC BLOOD PRESSURE: 72 MMHG | TEMPERATURE: 98 F | BODY MASS INDEX: 35 KG/M2 | OXYGEN SATURATION: 98 % | HEART RATE: 73 BPM | WEIGHT: 185.25 LBS

## 2023-10-24 DIAGNOSIS — G89.29 CHRONIC PAIN OF LEFT KNEE: ICD-10-CM

## 2023-10-24 DIAGNOSIS — M17.0 PRIMARY OSTEOARTHRITIS OF BOTH KNEES: Primary | ICD-10-CM

## 2023-10-24 DIAGNOSIS — M25.562 CHRONIC PAIN OF LEFT KNEE: ICD-10-CM

## 2023-10-24 DIAGNOSIS — Z96.652 STATUS POST LEFT KNEE REPLACEMENT: ICD-10-CM

## 2023-10-24 PROCEDURE — 1111F DSCHRG MED/CURRENT MED MERGE: CPT | Performed by: FAMILY MEDICINE

## 2023-10-24 PROCEDURE — 99214 OFFICE O/P EST MOD 30 MIN: CPT | Performed by: FAMILY MEDICINE

## 2023-10-24 PROCEDURE — 3078F DIAST BP <80 MM HG: CPT | Performed by: FAMILY MEDICINE

## 2023-10-24 PROCEDURE — 3074F SYST BP LT 130 MM HG: CPT | Performed by: FAMILY MEDICINE

## 2023-10-24 NOTE — PROGRESS NOTES
Esperanza Odell is a 70year old female. HPI:   Mignon Salas is here for follow up after she had her knee replaced just over a week ago. She is doing well overall, and has been doing home PT, taking pain meds as prescribed, had some constipation issues but  getting gbetter, taking tramadol prior to physical therapy, sleep is ok and appetite starting to come back  Current Outpatient Medications   Medication Sig Dispense Refill    acetaminophen 500 MG Oral Tab Take 2 tablets (1,000 mg total) by mouth in the morning, at noon, in the evening, and at bedtime. 120 tablet 0    docusate sodium 100 MG Oral Cap Take 1 capsule (100 mg total) by mouth 2 (two) times daily. traMADol 50 MG Oral Tab Take 1 tablet (50 mg total) by mouth every 6 (six) hours as needed for Pain. oxyCODONE HCl 5 MG Oral Cap Take 1 capsule (5 mg total) by mouth every 6 (six) hours as needed. ASPIRIN LOW DOSE 81 MG Oral Tab EC TAKE 1 TABLET BY MOUTH TWICE DAILY. DO NOT START UNTIL DAY AFTER SURGERY      cycloSPORINE non-modified 25 MG Oral Cap TAKE 3 TABLETS BY MOUTH EVERY  MORNING AND 2 TABLETS BY MOUTH  EVERY EVENING 450 capsule 0    losartan 50 MG Oral Tab Take 1 tablet (50 mg total) by mouth daily. 90 tablet 1    Mycophenolate Mofetil 500 MG Oral Tab Take 2 tablets (1,000 mg total) by mouth 2 (two) times daily. 360 tablet 3    atorvastatin 20 MG Oral Tab Take 1 tablet (20 mg total) by mouth daily. 90 tablet 3    predniSONE 5 MG Oral Tab Take 1 tablet (5 mg total) by mouth daily. 90 tablet 1    Glucose Blood In Vitro Strip 1 each by Other route daily. 90 each 3    magnesium oxide 400 MG Oral Tab Take 1 tablet (400 mg total) by mouth 2 (two) times daily. multivitamin Oral Tab Take 1 tablet by mouth daily. Cinnamon 500 MG Oral Cap Take 1,000 mg by mouth 2 (two) times daily. Misc Natural Products (OSTEO BI-FLEX ADV DOUBLE ST OR) Take 1 tablet by mouth 2 (two) times daily.       Omega 3 1000 MG Oral Cap Take 2,000 mg by mouth 2 (two) times daily. Cholecalciferol (VITAMIN D) 1000 UNITS Oral Cap Take 1 capsule by mouth 2 (two) times daily. Past Medical History:   Diagnosis Date    Arthritis Mid 2006 ? Knees bone on bone    Back problem     Cancer (HCC)     Easy bruising     ASA 81 mg daily    Glomerulonephritis     Hemorrhoids 1981    Pregnancy    History of COVID-19 09/18/2023    fatigue. not hospitalized. no continued symptoms    Kidney failure Pregnancy post op 1983    Transplant 4/25/02    Osteoarthritis     Other and unspecified hyperlipidemia     Pain in joints     Type II or unspecified type diabetes mellitus without mention of complication, not stated as uncontrolled     Unspecified essential hypertension     Visual impairment     glasses / contacts    Wears glasses 1980      Social History:  Social History     Socioeconomic History    Marital status:    Tobacco Use    Smoking status: Never    Smokeless tobacco: Never   Vaping Use    Vaping Use: Never used   Substance and Sexual Activity    Alcohol use: No    Drug use: No   Social Determinants of Health  Food Insecurity: No Food Insecurity (10/16/2023)      Food Insecurity          Food Insecurity: Never true  Transportation Needs: No Transportation Needs (10/16/2023)      Transportation Needs          Lack of Transportation: No  Housing Stability: Low Risk  (10/16/2023)      Housing Stability          Housing Instability: No     REVIEW OF SYSTEMS:   GENERAL HEALTH: feels well otherwise  SKIN: denies any unusual skin lesions or rashes  RESPIRATORY: denies shortness of breath with exertion  CARDIOVASCULAR: denies chest pain on exertion  GI: denies abdominal pain and denies heartburn  NEURO: denies headaches  MUSC: left knee pain after replacement has some swelling no fever  has some bruising  EXAM:   There were no vitals taken for this visit.   GENERAL: well developed, well nourished,in no apparent distress  SKIN: no rashes,no suspicious lesions  HEENT: atraumatic, normocephalic,ears and throat are clear  NECK: supple,no adenopathy,no bruits  LUNGS: clear to auscultation  CARDIO: RRR without murmur  GI: good BS's,no masses, HSM or tenderness  EXTREMITIES: no cyanosis, clubbing or edema    ASSESSMENT AND PLAN:   Primary osteoarthritis of both knees  (primary encounter diagnosis)  Status post left knee replacement  Chronic pain of left knee    DISCUSSED PT AND NOT OVERDOING IT, TAKE THE TRAMADOL 1 HOUR PRIOR TO PT, CONTINUE EXERCISES, KEEP ELEVATED AND ICE  USE WALKER UNTIL TOLD TO STOP. CAN USE SOME MIRALAX DAILY UNTIL BM'S IMPROVE, INCREASE FIBER AND WATER       Imaging & Consults:  None     The patient indicates understanding of these issues and agrees to the plan. The patient is asked to return in 1 month.

## 2023-11-27 ENCOUNTER — OFFICE VISIT (OUTPATIENT)
Dept: FAMILY MEDICINE CLINIC | Facility: CLINIC | Age: 63
End: 2023-11-27
Payer: COMMERCIAL

## 2023-11-27 VITALS
TEMPERATURE: 98 F | BODY MASS INDEX: 34 KG/M2 | HEART RATE: 75 BPM | WEIGHT: 181 LBS | RESPIRATION RATE: 16 BRPM | OXYGEN SATURATION: 98 % | DIASTOLIC BLOOD PRESSURE: 74 MMHG | SYSTOLIC BLOOD PRESSURE: 124 MMHG

## 2023-11-27 DIAGNOSIS — M25.562 CHRONIC PAIN OF LEFT KNEE: ICD-10-CM

## 2023-11-27 DIAGNOSIS — Z96.652 STATUS POST LEFT KNEE REPLACEMENT: Primary | ICD-10-CM

## 2023-11-27 DIAGNOSIS — G89.29 CHRONIC PAIN OF LEFT KNEE: ICD-10-CM

## 2023-11-27 PROCEDURE — 99213 OFFICE O/P EST LOW 20 MIN: CPT | Performed by: FAMILY MEDICINE

## 2023-11-27 PROCEDURE — 3074F SYST BP LT 130 MM HG: CPT | Performed by: FAMILY MEDICINE

## 2023-11-27 PROCEDURE — 3078F DIAST BP <80 MM HG: CPT | Performed by: FAMILY MEDICINE

## 2023-12-01 ENCOUNTER — TELEPHONE (OUTPATIENT)
Dept: FAMILY MEDICINE CLINIC | Facility: CLINIC | Age: 63
End: 2023-12-01

## 2023-12-01 NOTE — TELEPHONE ENCOUNTER
Reached patient for medication adherence consult. Patient overdue for refill on losartan per insurance report. Patient reports that she is taking her losartan as prescribed. She reports tolerating the medication well. She states that she has plenty of medication left at this time and does not need a refill. Provided education on importance of adherence. Patient denies any questions or concerns with medication.

## 2023-12-04 ENCOUNTER — TELEPHONE (OUTPATIENT)
Dept: FAMILY MEDICINE CLINIC | Facility: CLINIC | Age: 63
End: 2023-12-04

## 2023-12-05 ENCOUNTER — MED REC SCAN ONLY (OUTPATIENT)
Dept: FAMILY MEDICINE CLINIC | Facility: CLINIC | Age: 63
End: 2023-12-05

## 2023-12-11 ENCOUNTER — LABORATORY ENCOUNTER (OUTPATIENT)
Dept: LAB | Age: 63
End: 2023-12-11
Attending: INTERNAL MEDICINE
Payer: MEDICARE

## 2023-12-11 DIAGNOSIS — N18.30 STAGE 3 CHRONIC KIDNEY DISEASE, UNSPECIFIED WHETHER STAGE 3A OR 3B CKD (HCC): ICD-10-CM

## 2023-12-11 DIAGNOSIS — Z94.0 RENAL TRANSPLANT RECIPIENT: ICD-10-CM

## 2023-12-11 DIAGNOSIS — Z94.0 RENAL TRANSPLANT RECIPIENT (HCC): ICD-10-CM

## 2023-12-11 DIAGNOSIS — I10 ESSENTIAL HYPERTENSION: ICD-10-CM

## 2023-12-11 LAB
BILIRUB UR QL STRIP.AUTO: NEGATIVE
CLARITY UR REFRACT.AUTO: CLEAR
COLOR UR AUTO: YELLOW
CREAT UR-SCNC: 285 MG/DL
GLUCOSE UR STRIP.AUTO-MCNC: NORMAL MG/DL
HYALINE CASTS #/AREA URNS AUTO: PRESENT /LPF
KETONES UR STRIP.AUTO-MCNC: NEGATIVE MG/DL
LEUKOCYTE ESTERASE UR QL STRIP.AUTO: 250
MICROALBUMIN UR-MCNC: 7.66 MG/DL
MICROALBUMIN/CREAT 24H UR-RTO: 26.9 UG/MG (ref ?–30)
NITRITE UR QL STRIP.AUTO: NEGATIVE
PH UR STRIP.AUTO: 5.5 [PH] (ref 5–8)
PROT UR STRIP.AUTO-MCNC: 20 MG/DL
RBC UR QL AUTO: NEGATIVE
SP GR UR STRIP.AUTO: 1.02 (ref 1–1.03)
UROBILINOGEN UR STRIP.AUTO-MCNC: NORMAL MG/DL

## 2023-12-11 PROCEDURE — 81001 URINALYSIS AUTO W/SCOPE: CPT

## 2023-12-11 PROCEDURE — 82043 UR ALBUMIN QUANTITATIVE: CPT

## 2023-12-11 PROCEDURE — 82570 ASSAY OF URINE CREATININE: CPT

## 2023-12-11 PROCEDURE — 3061F NEG MICROALBUMINURIA REV: CPT | Performed by: INTERNAL MEDICINE

## 2023-12-11 PROCEDURE — 80158 DRUG ASSAY CYCLOSPORINE: CPT

## 2023-12-11 PROCEDURE — 36415 COLL VENOUS BLD VENIPUNCTURE: CPT

## 2023-12-11 RX ORDER — PREDNISONE 5 MG/1
5 TABLET ORAL DAILY
Qty: 90 TABLET | Refills: 1 | Status: SHIPPED | OUTPATIENT
Start: 2023-12-11

## 2023-12-14 LAB — CYCLOSPORINE -MS/MS: 92 NG/ML

## 2023-12-21 ENCOUNTER — OFFICE VISIT (OUTPATIENT)
Dept: NEPHROLOGY | Facility: CLINIC | Age: 63
End: 2023-12-21
Payer: COMMERCIAL

## 2023-12-21 VITALS — WEIGHT: 179.38 LBS | BODY MASS INDEX: 34 KG/M2 | DIASTOLIC BLOOD PRESSURE: 72 MMHG | SYSTOLIC BLOOD PRESSURE: 120 MMHG

## 2023-12-21 DIAGNOSIS — Z94.0 RENAL TRANSPLANT RECIPIENT: Primary | ICD-10-CM

## 2023-12-21 DIAGNOSIS — N18.30 STAGE 3 CHRONIC KIDNEY DISEASE, UNSPECIFIED WHETHER STAGE 3A OR 3B CKD (HCC): ICD-10-CM

## 2023-12-21 DIAGNOSIS — I10 ESSENTIAL HYPERTENSION: ICD-10-CM

## 2023-12-21 PROCEDURE — 99214 OFFICE O/P EST MOD 30 MIN: CPT | Performed by: INTERNAL MEDICINE

## 2023-12-21 PROCEDURE — 3074F SYST BP LT 130 MM HG: CPT | Performed by: INTERNAL MEDICINE

## 2023-12-21 PROCEDURE — 3078F DIAST BP <80 MM HG: CPT | Performed by: INTERNAL MEDICINE

## 2024-02-05 DIAGNOSIS — Z94.0 RENAL TRANSPLANT RECIPIENT (HCC): ICD-10-CM

## 2024-02-05 DIAGNOSIS — N18.30 STAGE 3 CHRONIC KIDNEY DISEASE, UNSPECIFIED WHETHER STAGE 3A OR 3B CKD (HCC): ICD-10-CM

## 2024-02-05 DIAGNOSIS — I10 ESSENTIAL HYPERTENSION: ICD-10-CM

## 2024-02-05 RX ORDER — CYCLOSPORINE 25 MG/1
CAPSULE, GELATIN COATED ORAL
Qty: 450 CAPSULE | Refills: 3 | Status: SHIPPED | OUTPATIENT
Start: 2024-02-05

## 2024-02-09 ENCOUNTER — TELEPHONE (OUTPATIENT)
Dept: FAMILY MEDICINE CLINIC | Facility: CLINIC | Age: 64
End: 2024-02-09

## 2024-02-12 DIAGNOSIS — N18.30 STAGE 3 CHRONIC KIDNEY DISEASE, UNSPECIFIED WHETHER STAGE 3A OR 3B CKD (HCC): ICD-10-CM

## 2024-02-12 DIAGNOSIS — Z94.0 RENAL TRANSPLANT RECIPIENT (HCC): ICD-10-CM

## 2024-02-12 DIAGNOSIS — I10 ESSENTIAL HYPERTENSION: ICD-10-CM

## 2024-02-12 RX ORDER — LOSARTAN POTASSIUM 50 MG/1
50 TABLET ORAL DAILY
Qty: 100 TABLET | Refills: 2 | Status: SHIPPED | OUTPATIENT
Start: 2024-02-12

## 2024-04-08 ENCOUNTER — OFFICE VISIT (OUTPATIENT)
Dept: FAMILY MEDICINE CLINIC | Facility: CLINIC | Age: 64
End: 2024-04-08
Payer: COMMERCIAL

## 2024-04-08 VITALS
OXYGEN SATURATION: 97 % | WEIGHT: 177.13 LBS | DIASTOLIC BLOOD PRESSURE: 78 MMHG | TEMPERATURE: 98 F | SYSTOLIC BLOOD PRESSURE: 132 MMHG | BODY MASS INDEX: 33 KG/M2 | RESPIRATION RATE: 16 BRPM | HEART RATE: 92 BPM

## 2024-04-08 DIAGNOSIS — M54.31 SCIATICA, RIGHT SIDE: ICD-10-CM

## 2024-04-08 DIAGNOSIS — E11.9 TYPE 2 DIABETES MELLITUS WITHOUT COMPLICATION, WITHOUT LONG-TERM CURRENT USE OF INSULIN (HCC): Primary | ICD-10-CM

## 2024-04-08 DIAGNOSIS — M62.838 SPASM OF RIGHT PIRIFORMIS MUSCLE: ICD-10-CM

## 2024-04-08 DIAGNOSIS — M25.551 ACUTE HIP PAIN, RIGHT: ICD-10-CM

## 2024-04-08 PROCEDURE — 3078F DIAST BP <80 MM HG: CPT | Performed by: FAMILY MEDICINE

## 2024-04-08 PROCEDURE — 99213 OFFICE O/P EST LOW 20 MIN: CPT | Performed by: FAMILY MEDICINE

## 2024-04-08 PROCEDURE — 3075F SYST BP GE 130 - 139MM HG: CPT | Performed by: FAMILY MEDICINE

## 2024-04-08 NOTE — PROGRESS NOTES
Brinda Max is a 63 year old female.  HPI:   Brinda is here for evaluation of right hip pain. She was putting away groceries and did not have any issues that day but the next day had severe right hip pain right posterior and lateral hip. It was cramping and radiating into her right foot. She also experienced cramping of the right buttock as well. She went to the [facility] first was given a shot of toradol and sent home got minimal relief the following day the paoin was much worse, and had to go to the ER. Was told she had a piriformis issue. She was given steroids and pain meds and sent home the next 12 days were difficult. She had some pain into her left foot due to cramping has been ambulating with a walker.  She states that on day 13 she soul not lie flat and slept upright at her dining room table on a hard chair. When she woke up the next morning  and her pain was pretty much gone. She denies any paresthesia, no wekaness, taking pain meds PRN still using a 4 point cane for support.    Current Outpatient Medications   Medication Sig Dispense Refill    losartan 50 MG Oral Tab Take 1 tablet (50 mg total) by mouth daily. 100 tablet 2    cycloSPORINE non-modified 25 MG Oral Cap TAKE 3 CAPSULES BY MOUTH EVERY  MORNING AND 2 CAPSULES EVERY  EVENING 450 capsule 3    predniSONE 5 MG Oral Tab Take 1 tablet (5 mg total) by mouth daily. 90 tablet 1    ASPIRIN LOW DOSE 81 MG Oral Tab EC Take 1 tablet (81 mg total) by mouth daily.      Mycophenolate Mofetil 500 MG Oral Tab Take 2 tablets (1,000 mg total) by mouth 2 (two) times daily. 360 tablet 3    atorvastatin 20 MG Oral Tab Take 1 tablet (20 mg total) by mouth daily. 90 tablet 3    Glucose Blood In Vitro Strip 1 each by Other route daily. 90 each 3    magnesium oxide 400 MG Oral Tab Take 1 tablet (400 mg total) by mouth 2 (two) times daily.      multivitamin Oral Tab Take 1 tablet by mouth daily.      Cinnamon 500 MG Oral Cap Take 1,000 mg by mouth 2 (two) times daily.       Omega 3 1000 MG Oral Cap Take 2,000 mg by mouth 2 (two) times daily.        Cholecalciferol (VITAMIN D) 1000 UNITS Oral Cap Take 1 capsule by mouth 2 (two) times daily.        Past Medical History:   Diagnosis Date    Arthritis Mid 2006 ?    Knees bone on bone    Back problem     Cancer (HCC)     Easy bruising     ASA 81 mg daily    Glomerulonephritis     Hemorrhoids 1981    Pregnancy    History of COVID-19 09/18/2023    fatigue. not hospitalized. no continued symptoms    Kidney failure Pregnancy post op 1983    Transplant 4/25/02    Osteoarthritis     Other and unspecified hyperlipidemia     Pain in joints     Type II or unspecified type diabetes mellitus without mention of complication, not stated as uncontrolled     Unspecified essential hypertension     Visual impairment     glasses / contacts    Wears glasses 1980      Social History:  Social History     Socioeconomic History    Marital status:    Tobacco Use    Smoking status: Never    Smokeless tobacco: Never   Vaping Use    Vaping Use: Never used   Substance and Sexual Activity    Alcohol use: No    Drug use: No     Social Determinants of Health     Food Insecurity: No Food Insecurity (10/16/2023)    Food Insecurity     Food Insecurity: Never true   Transportation Needs: No Transportation Needs (10/16/2023)    Transportation Needs     Lack of Transportation: No   Housing Stability: Low Risk  (10/16/2023)    Housing Stability     Housing Instability: No        REVIEW OF SYSTEMS:   GENERAL HEALTH: feels well otherwise  SKIN: denies any unusual skin lesions or rashes  RESPIRATORY: denies shortness of breath with exertion  CARDIOVASCULAR: denies chest pain on exertion  GI: denies abdominal pain and denies heartburn  NEURO: denies headaches  MUSC: right buttock pain with sciatica  EXAM:   /78   Pulse 92   Temp 98.4 °F (36.9 °C) (Temporal)   Resp 16   Wt 177 lb 2 oz (80.3 kg)   SpO2 97%   BMI 33.47 kg/m²   GENERAL: well developed, well  nourished,in no apparent distress  SKIN: no rashes,no suspicious lesions  HEENT: atraumatic, normocephalic,ears and throat are clear  NECK: supple,no adenopathy,no bruits  LUNGS: clear to auscultation  CARDIO: RRR without murmur  GI: good BS's,no masses, HSM or tenderness  EXTREMITIES: no cyanosis, clubbing or edema  Has reproducible pain over the  right pirformis  ASSESSMENT AND PLAN:     Encounter Diagnoses   Name Primary?    Type 2 diabetes mellitus without complication, without long-term current use of insulin (HCC) Yes    Acute hip pain, right     Spasm of right piriformis muscle     Sciatica, right side        Discussed etiology  of sciatica related to piriformis spasm, no bending at the hip, Was given exercises to perform  3 sets of 10-15 reps each bid  To call back if any side effects or if no significant change in   2 weeks   Meds & Refills for this Visit:  Requested Prescriptions      No prescriptions requested or ordered in this encounter       Imaging & Consults:  None     The patient indicates understanding of these issues and agrees to the plan.  The patient is asked to return in if her sxc persists.

## 2024-04-17 ENCOUNTER — LABORATORY ENCOUNTER (OUTPATIENT)
Dept: LAB | Age: 64
End: 2024-04-17
Attending: INTERNAL MEDICINE
Payer: MEDICARE

## 2024-04-17 DIAGNOSIS — Z94.0 RENAL TRANSPLANT RECIPIENT (HCC): ICD-10-CM

## 2024-04-17 DIAGNOSIS — I10 ESSENTIAL HYPERTENSION: ICD-10-CM

## 2024-04-17 DIAGNOSIS — E78.5 DYSLIPIDEMIA ASSOCIATED WITH TYPE 2 DIABETES MELLITUS (HCC): ICD-10-CM

## 2024-04-17 DIAGNOSIS — E11.69 DYSLIPIDEMIA ASSOCIATED WITH TYPE 2 DIABETES MELLITUS (HCC): ICD-10-CM

## 2024-04-17 DIAGNOSIS — N18.30 STAGE 3 CHRONIC KIDNEY DISEASE, UNSPECIFIED WHETHER STAGE 3A OR 3B CKD (HCC): Primary | ICD-10-CM

## 2024-04-17 LAB
ALBUMIN SERPL-MCNC: 3.2 G/DL (ref 3.4–5)
ALBUMIN/GLOB SERPL: 0.9 {RATIO} (ref 1–2)
ALP LIVER SERPL-CCNC: 86 U/L
ALT SERPL-CCNC: 15 U/L
ANION GAP SERPL CALC-SCNC: 6 MMOL/L (ref 0–18)
AST SERPL-CCNC: 18 U/L (ref 15–37)
BASOPHILS # BLD AUTO: 0.07 X10(3) UL (ref 0–0.2)
BASOPHILS NFR BLD AUTO: 1 %
BILIRUB SERPL-MCNC: 0.7 MG/DL (ref 0.1–2)
BILIRUB UR QL STRIP.AUTO: NEGATIVE
BUN BLD-MCNC: 25 MG/DL (ref 9–23)
CALCIUM BLD-MCNC: 9.5 MG/DL (ref 8.5–10.1)
CHLORIDE SERPL-SCNC: 111 MMOL/L (ref 98–112)
CHOLEST SERPL-MCNC: 171 MG/DL (ref ?–200)
CO2 SERPL-SCNC: 27 MMOL/L (ref 21–32)
CREAT BLD-MCNC: 1.41 MG/DL
CREAT UR-SCNC: 85.6 MG/DL
EGFRCR SERPLBLD CKD-EPI 2021: 41 ML/MIN/1.73M2 (ref 60–?)
EOSINOPHIL # BLD AUTO: 0.21 X10(3) UL (ref 0–0.7)
EOSINOPHIL NFR BLD AUTO: 3.1 %
ERYTHROCYTE [DISTWIDTH] IN BLOOD BY AUTOMATED COUNT: 13.6 %
EST. AVERAGE GLUCOSE BLD GHB EST-MCNC: 148 MG/DL (ref 68–126)
FASTING PATIENT LIPID ANSWER: YES
FASTING STATUS PATIENT QL REPORTED: YES
GLOBULIN PLAS-MCNC: 3.5 G/DL (ref 2.8–4.4)
GLUCOSE BLD-MCNC: 96 MG/DL (ref 70–99)
GLUCOSE UR STRIP.AUTO-MCNC: NORMAL MG/DL
HBA1C MFR BLD: 6.8 % (ref ?–5.7)
HCT VFR BLD AUTO: 38.6 %
HDLC SERPL-MCNC: 67 MG/DL (ref 40–59)
HGB BLD-MCNC: 11.9 G/DL
IMM GRANULOCYTES # BLD AUTO: 0.02 X10(3) UL (ref 0–1)
IMM GRANULOCYTES NFR BLD: 0.3 %
KETONES UR STRIP.AUTO-MCNC: NEGATIVE MG/DL
LDLC SERPL CALC-MCNC: 86 MG/DL (ref ?–100)
LEUKOCYTE ESTERASE UR QL STRIP.AUTO: 250
LYMPHOCYTES # BLD AUTO: 2.6 X10(3) UL (ref 1–4)
LYMPHOCYTES NFR BLD AUTO: 37.8 %
MCH RBC QN AUTO: 29.5 PG (ref 26–34)
MCHC RBC AUTO-ENTMCNC: 30.8 G/DL (ref 31–37)
MCV RBC AUTO: 95.8 FL
MICROALBUMIN UR-MCNC: 7.44 MG/DL
MICROALBUMIN/CREAT 24H UR-RTO: 86.9 UG/MG (ref ?–30)
MONOCYTES # BLD AUTO: 0.51 X10(3) UL (ref 0.1–1)
MONOCYTES NFR BLD AUTO: 7.4 %
NEUTROPHILS # BLD AUTO: 3.47 X10 (3) UL (ref 1.5–7.7)
NEUTROPHILS # BLD AUTO: 3.47 X10(3) UL (ref 1.5–7.7)
NEUTROPHILS NFR BLD AUTO: 50.4 %
NONHDLC SERPL-MCNC: 104 MG/DL (ref ?–130)
OSMOLALITY SERPL CALC.SUM OF ELEC: 302 MOSM/KG (ref 275–295)
PH UR STRIP.AUTO: 6.5 [PH] (ref 5–8)
PLATELET # BLD AUTO: 269 10(3)UL (ref 150–450)
POTASSIUM SERPL-SCNC: 4.2 MMOL/L (ref 3.5–5.1)
PROT SERPL-MCNC: 6.7 G/DL (ref 6.4–8.2)
PROT UR STRIP.AUTO-MCNC: NEGATIVE MG/DL
RBC # BLD AUTO: 4.03 X10(6)UL
RBC UR QL AUTO: NEGATIVE
SODIUM SERPL-SCNC: 144 MMOL/L (ref 136–145)
SP GR UR STRIP.AUTO: 1.02 (ref 1–1.03)
TRIGL SERPL-MCNC: 101 MG/DL (ref 30–149)
UROBILINOGEN UR STRIP.AUTO-MCNC: NORMAL MG/DL
VLDLC SERPL CALC-MCNC: 16 MG/DL (ref 0–30)
WBC # BLD AUTO: 6.9 X10(3) UL (ref 4–11)

## 2024-04-17 PROCEDURE — 80158 DRUG ASSAY CYCLOSPORINE: CPT

## 2024-04-17 PROCEDURE — 82043 UR ALBUMIN QUANTITATIVE: CPT

## 2024-04-17 PROCEDURE — 85025 COMPLETE CBC W/AUTO DIFF WBC: CPT

## 2024-04-17 PROCEDURE — 81001 URINALYSIS AUTO W/SCOPE: CPT

## 2024-04-17 PROCEDURE — 83036 HEMOGLOBIN GLYCOSYLATED A1C: CPT

## 2024-04-17 PROCEDURE — 80053 COMPREHEN METABOLIC PANEL: CPT

## 2024-04-17 PROCEDURE — 36415 COLL VENOUS BLD VENIPUNCTURE: CPT

## 2024-04-17 PROCEDURE — 80061 LIPID PANEL: CPT

## 2024-04-17 PROCEDURE — 82570 ASSAY OF URINE CREATININE: CPT

## 2024-04-19 LAB — CYCLOSPORINE: 103 UG/L

## 2024-04-23 ENCOUNTER — HOSPITAL ENCOUNTER (OUTPATIENT)
Dept: GENERAL RADIOLOGY | Age: 64
Discharge: HOME | End: 2024-04-23
Attending: FAMILY MEDICINE
Payer: MEDICARE

## 2024-04-23 ENCOUNTER — TELEPHONE (OUTPATIENT)
Dept: FAMILY MEDICINE CLINIC | Facility: CLINIC | Age: 64
End: 2024-04-23

## 2024-04-23 ENCOUNTER — OFFICE VISIT (OUTPATIENT)
Dept: FAMILY MEDICINE CLINIC | Facility: CLINIC | Age: 64
End: 2024-04-23
Payer: COMMERCIAL

## 2024-04-23 VITALS
BODY MASS INDEX: 34.65 KG/M2 | TEMPERATURE: 98 F | WEIGHT: 176.5 LBS | RESPIRATION RATE: 16 BRPM | DIASTOLIC BLOOD PRESSURE: 60 MMHG | SYSTOLIC BLOOD PRESSURE: 106 MMHG | HEIGHT: 60 IN | OXYGEN SATURATION: 99 % | HEART RATE: 79 BPM

## 2024-04-23 DIAGNOSIS — N18.30 DIABETES MELLITUS WITH STAGE 3 CHRONIC KIDNEY DISEASE (HCC): ICD-10-CM

## 2024-04-23 DIAGNOSIS — I10 PRIMARY HYPERTENSION: ICD-10-CM

## 2024-04-23 DIAGNOSIS — E11.9 TYPE 2 DIABETES MELLITUS WITHOUT COMPLICATION, WITHOUT LONG-TERM CURRENT USE OF INSULIN (HCC): ICD-10-CM

## 2024-04-23 DIAGNOSIS — M54.41 ACUTE RIGHT-SIDED LOW BACK PAIN WITH RIGHT-SIDED SCIATICA: ICD-10-CM

## 2024-04-23 DIAGNOSIS — Z94.0 RENAL TRANSPLANT RECIPIENT (HCC): ICD-10-CM

## 2024-04-23 DIAGNOSIS — Z00.00 ENCOUNTER FOR MEDICARE ANNUAL WELLNESS EXAM: ICD-10-CM

## 2024-04-23 DIAGNOSIS — Z00.00 MEDICARE ANNUAL WELLNESS VISIT, SUBSEQUENT: Primary | ICD-10-CM

## 2024-04-23 DIAGNOSIS — E78.5 DYSLIPIDEMIA ASSOCIATED WITH TYPE 2 DIABETES MELLITUS (HCC): ICD-10-CM

## 2024-04-23 DIAGNOSIS — M17.0 PRIMARY OSTEOARTHRITIS OF BOTH KNEES: ICD-10-CM

## 2024-04-23 DIAGNOSIS — E11.69 DYSLIPIDEMIA ASSOCIATED WITH TYPE 2 DIABETES MELLITUS (HCC): ICD-10-CM

## 2024-04-23 DIAGNOSIS — M43.16 LUMBAR ADJACENT SEGMENT DISEASE WITH SPONDYLOLISTHESIS: ICD-10-CM

## 2024-04-23 DIAGNOSIS — Z86.010 HISTORY OF ADENOMATOUS POLYP OF COLON: ICD-10-CM

## 2024-04-23 DIAGNOSIS — E66.01 MORBID (SEVERE) OBESITY DUE TO EXCESS CALORIES (HCC): ICD-10-CM

## 2024-04-23 DIAGNOSIS — M51.36 LUMBAR ADJACENT SEGMENT DISEASE WITH SPONDYLOLISTHESIS: ICD-10-CM

## 2024-04-23 DIAGNOSIS — Z00.00 ENCOUNTER FOR ANNUAL HEALTH EXAMINATION: ICD-10-CM

## 2024-04-23 DIAGNOSIS — D12.3 BENIGN NEOPLASM OF TRANSVERSE COLON: ICD-10-CM

## 2024-04-23 DIAGNOSIS — E11.22 DIABETES MELLITUS WITH STAGE 3 CHRONIC KIDNEY DISEASE (HCC): ICD-10-CM

## 2024-04-23 DIAGNOSIS — E78.2 MIXED HYPERLIPIDEMIA: ICD-10-CM

## 2024-04-23 DIAGNOSIS — Z12.31 VISIT FOR SCREENING MAMMOGRAM: ICD-10-CM

## 2024-04-23 PROCEDURE — 72110 X-RAY EXAM L-2 SPINE 4/>VWS: CPT | Performed by: FAMILY MEDICINE

## 2024-04-23 NOTE — PROGRESS NOTES
Subjective:   Brinda Max is a 64 year old female who presents for a Medicare Subsequent Annual Wellness visit (Pt already had Initial Annual Wellness) and scheduled follow up of multiple significant but stable problems.   Brinda is having issues with her right hip, she was seen for this issue earlier in the month is getting worse especially at night, trying to exercise but limited. She also notes the nature of the pain has changed and is now radiating from her back into her leg  With paresthesia into her foot and toes. Had  labs with Dr. Whitt, and has an upcoming appt with her endocrinologist Dr. CARTER.  History/Other:   Fall Risk Assessment:   She has been screened for Falls and is low risk.    Having some back and hip issues  Cognitive Assessment:   She had a completely normal cognitive assessment - see flowsheet entries     Functional Ability/Status:   Brinda Max has a completely normal functional assessment. See flowsheet for details.        Depression Screening (PHQ-2/PHQ-9): PHQ-2 SCORE: 0  , done 4/23/2024             Advanced Directives:   She does have a Living Will but we do NOT have it on file in Epic.    She does have a POA but we do NOT have it on file in Epic.    Discussed Advance Care Planning with patient (and family/surrogate if present). Standard forms made available to patient in After Visit Summary.      Patient Active Problem List   Diagnosis    Renal transplant recipient (HCC)    Diabetes mellitus with stage 3 chronic kidney disease (HCC)    Primary hypertension    Mixed hyperlipidemia    Primary osteoarthritis of both knees    Dyslipidemia associated with type 2 diabetes mellitus (HCC)    Type 2 diabetes mellitus without complication, without long-term current use of insulin (HCC)    History of adenomatous polyp of colon    Benign neoplasm of transverse colon    Morbid (severe) obesity due to excess calories (HCC)     Allergies:  She is allergic to adhesive tape and ace  inhibitors.    Current Medications:  Outpatient Medications Marked as Taking for the 4/23/24 encounter (Office Visit) with Curtis Huber,    Medication Sig    losartan 50 MG Oral Tab Take 1 tablet (50 mg total) by mouth daily.    cycloSPORINE non-modified 25 MG Oral Cap TAKE 3 CAPSULES BY MOUTH EVERY  MORNING AND 2 CAPSULES EVERY  EVENING    predniSONE 5 MG Oral Tab Take 1 tablet (5 mg total) by mouth daily.    ASPIRIN LOW DOSE 81 MG Oral Tab EC Take 1 tablet (81 mg total) by mouth daily.    Mycophenolate Mofetil 500 MG Oral Tab Take 2 tablets (1,000 mg total) by mouth 2 (two) times daily.    atorvastatin 20 MG Oral Tab Take 1 tablet (20 mg total) by mouth daily.    Glucose Blood In Vitro Strip 1 each by Other route daily.    magnesium oxide 400 MG Oral Tab Take 1 tablet (400 mg total) by mouth 2 (two) times daily.    multivitamin Oral Tab Take 1 tablet by mouth daily.    Cinnamon 500 MG Oral Cap Take 1,000 mg by mouth 2 (two) times daily.    Omega 3 1000 MG Oral Cap Take 2,000 mg by mouth 2 (two) times daily.      Cholecalciferol (VITAMIN D) 1000 UNITS Oral Cap Take 1 capsule by mouth 2 (two) times daily.       Medical History:  She  has a past medical history of Arthritis (Mid 2006 ?), Back problem, Cancer (HCC), Easy bruising, Glomerulonephritis, Hemorrhoids (1981), History of COVID-19 (09/18/2023), Kidney failure (Pregnancy post op 1983), Osteoarthritis, Other and unspecified hyperlipidemia, Pain in joints, Type II or unspecified type diabetes mellitus without mention of complication, not stated as uncontrolled, Unspecified essential hypertension, Visual impairment, and Wears glasses (1980).  Surgical History:  She  has a past surgical history that includes other surgical history (age 42); transplantation of kidney; colonoscopy (2013, 2017); tonsillectomy (1964); and organ transplant (2002).   Family History:  Her family history includes Diabetes in her mother; Other in her mother and mother; Other (age of  onset: 56) in her self.  Social History:  She  reports that she has never smoked. She has never used smokeless tobacco. She reports that she does not drink alcohol and does not use drugs.    Tobacco:  She has never smoked tobacco.    CAGE Alcohol Screen:   CAGE screening score of 0 on 4/23/2024, showing low risk of alcohol abuse.      Patient Care Team:  Curtis Huber DO as PCP - General (Family Practice)  Wicho Espinoza MD (NEPHROLOGY)  Jim Solano, PT as Physical Therapist    Review of Systems  GENERAL: feels well otherwise  SKIN: denies any unusual skin lesions  EYES: denies blurred vision or double vision  HEENT: denies nasal congestion, sinus pain or ST  LUNGS: denies shortness of breath with exertion  CARDIOVASCULAR: denies chest pain on exertion  GI: denies abdominal pain, denies heartburn  : denies dysuria, vaginal discharge or itching, no complaint of urinary incontinence   MUSCULOSKELETAL: right sided low back pain, hip pain   NEURO: denies headaches, paresthesia right leg  PSYCHE: denies depression or anxiety  HEMATOLOGIC: denies hx of anemia  ENDOCRINE: denies thyroid history  ALL/ASTHMA: denies hx of allergy or asthma    Objective:   Physical Exam  General Appearance:  Alert, cooperative, no distress, appears stated age   Head:  Normocephalic, without obvious abnormality, atraumatic   Eyes:  PERRL, conjunctiva/corneas clear, EOM's intact both eyes   Ears:  Normal TM's and external ear canals, both ears   Nose: Nares normal, septum midline,mucosa normal, no drainage or sinus tenderness   Throat: Lips, mucosa, and tongue normal; teeth and gums normal   Neck: Supple, symmetrical, trachea midline, no adenopathy;  thyroid: not enlarged, symmetric, no tenderness/mass/nodules; no carotid bruit or JVD   Back:   Symmetric, no curvature, ROM normal, no CVA tenderness   Lungs:   Clear to auscultation bilaterally, respirations unlabored   Heart:  Regular rate and rhythm, S1 and S2 normal, no murmur, rub,  or gallop   Abdomen:   Soft, non-tender, bowel sounds active all four quadrants,  no masses, no organomegaly   Pelvic: Deferred   Extremities: Extremities normal, atraumatic, no cyanosis or edema, has noted weakness with right hip flexion and extension, DTRs diminished on the right   Pulses: 2+ and symmetric   Skin: Skin color, texture, turgor normal, no rashes or lesions   Lymph nodes: Cervical, supraclavicular, and axillary nodes normal   Neurologic: Normal       /60   Pulse 79   Temp 97.8 °F (36.6 °C) (Temporal)   Resp 16   SpO2 99%  Estimated body mass index is 33.47 kg/m² as calculated from the following:    Height as of 9/25/23: 5' 1\" (1.549 m).    Weight as of 4/8/24: 177 lb 2 oz (80.3 kg).    Medicare Hearing Assessment:   Hearing Screening    Time taken: 4/23/2024  8:36 AM  Screening Method: Finger Rub  Finger Rub Result: Pass         Visual Acuity:   Right Eye Visual Acuity: Uncorrected Right Eye Chart Acuity: 20/20   Left Eye Visual Acuity: Uncorrected Left Eye Chart Acuity: 20/50   Both Eyes Visual Acuity: Uncorrected Both Eyes Chart Acuity: 20/20   Able To Tolerate Visual Acuity: Yes        Assessment & Plan:   Brinda Max is a 64 year old female who presents for a Medicare Assessment.     1. Medicare annual wellness visit, subsequent (Primary)  -     3D Mammogram Digital Screen, Bilateral (CPT=77067/17796); Future; Expected date: 04/23/2024  -     TSH W Reflex To Free T4; Future; Expected date: 04/23/2024,   2. Diabetes mellitus with stage 3 chronic kidney disease (HCC)  -     3D Mammogram Digital Screen, Bilateral (CPT=77067/99550); Future; Expected date: 04/23/2024  -     TSH W Reflex To Free T4; Future; Expected date: 04/23/2024, seeing endocrinology in a  week, discussed trying to get some form of physical activity to help get her A1c down a bit  3. Primary hypertension  -     TSH W Reflex To Free T4; Future; Expected date: 04/23/2024, currently stable continue losartan 50 mg daily  4.  Type 2 diabetes mellitus without complication, without long-term current use of insulin (HCC), could be due to the prednisone, and inactivity related to her back issues  5. Renal transplant recipient (HCC), stable  6. Dyslipidemia associated with type 2 diabetes mellitus (HCC)  -     TSH W Reflex To Free T4; Future; Expected date: 04/23/2024, continue atorvastatin 20 mg daily  7. Mixed hyperlipidemia, stable, continue atorvastatin  8. History of adenomatous polyp of colon, not ye due for colonoscopy  9. Benign neoplasm of transverse colon, stable  10. Morbid (severe) obesity due to excess calories (HCC), again try and work on exercise  11. Primary osteoarthritis of both knees, seeing Ortho next month  12. Visit for screening mammogram  -     3D Mammogram Digital Screen, Bilateral (CPT=77067/11261); Future; Expected date: 04/23/2024  13. Encounter for annual health examination  14. Encounter for Medicare annual wellness exam  15. ACUTE lumbar radiculopathy into right leg, await MRI  The patient indicates understanding of these issues and agrees to the plan.  Reinforced healthy diet, lifestyle, and exercise.  XRAY SHOWED MARKED SPONDYLOLISTHESIS OF THE L4-l5     Return in 1 year (on 4/23/2025).     Curtis Huber, DO, 4/23/2024     Supplementary Documentation:   General Health:  In the past six months, have you lost more than 10 pounds without trying?: 2 - No  Has your appetite been poor?: No  Type of Diet: Balanced  How does the patient maintain a good energy level?: Appropriate Exercise;Daily Walks;Stretching  How would you describe your daily physical activity?: Moderate  How would you describe your current health state?: Good  How do you maintain positive mental well-being?: Social Interaction;Puzzles;Visiting Family  On a scale of 0 to 10, with 0 being no pain and 10 being severe pain, what is your pain level?: 4 - (Moderate)  In the past six months, have you experienced urine leakage?: 0-No  At any time do you  feel concerned for the safety/well-being of yourself and/or your children, in your home or elsewhere?: No  Have you had any immunizations at another office such as Influenza, Hepatitis B, Tetanus, or Pneumococcal?: Yes         Brinda Max's SCREENING SCHEDULE   Tests on this list are recommended by your physician but may not be covered, or covered at this frequency, by your insurer.   Please check with your insurance carrier before scheduling to verify coverage.   PREVENTATIVE SERVICES FREQUENCY &  COVERAGE DETAILS LAST COMPLETION DATE   Diabetes Screening    Fasting Blood Sugar /  Glucose    One screening every 12 months if never tested or if previously tested but not diagnosed with pre-diabetes   One screening every 6 months if diagnosed with pre-diabetes Lab Results   Component Value Date    GLU 96 04/17/2024        Cardiovascular Disease Screening    Lipid Panel  Cholesterol  Lipoprotein (HDL)  Triglycerides Covered every 5 years for all Medicare beneficiaries without apparent signs or symptoms of cardiovascular disease Lab Results   Component Value Date    CHOLEST 171 04/17/2024    HDL 67 (H) 04/17/2024    LDL 86 04/17/2024    TRIG 101 04/17/2024         Electrocardiogram (EKG)   Covered if needed at Welcome to Medicare, and non-screening if indicated for medical reasons 08/28/2023      Ultrasound Screening for Abdominal Aortic Aneurysm (AAA) Covered once in a lifetime for one of the following risk factors    Men who are 65-75 years old and have ever smoked    Anyone with a family history -     Colorectal Cancer Screening  Covered for ages 50-85; only need ONE of the following:    Colonoscopy   Covered every 10 years    Covered every 2 years if patient is at high risk or previous colonoscopy was abnormal 08/23/2022    Health Maintenance   Topic Date Due    Colorectal Cancer Screening  08/23/2027       Flexible Sigmoidoscopy   Covered every 4 years -    Fecal Occult Blood Test Covered annually -   Bone Density  Screening    Bone density screening    Covered every 2 years after age 65 if diagnosed with risk of osteoporosis or estrogen deficiency.    Covered yearly for long-term glucocorticoid medication use (Steroids) Last Dexa Scan:    XR DEXA BONE DENSITOMETRY (CPT=77080) 08/12/2022      No recommendations at this time   Pap and Pelvic    Pap   Covered every 2 years for women at normal risk; Annually if at high risk -  No recommendations at this time    Chlamydia Annually if high risk -  No recommendations at this time   Screening Mammogram    Mammogram     Recommend annually for all female patients aged 40 and older    One baseline mammogram covered for patients aged 35-39 08/30/2023    Health Maintenance   Topic Date Due    Mammogram  08/30/2024       Immunizations    Influenza Covered once per flu season  Please get every year 10/17/2023  No recommendations at this time    Pneumococcal Each vaccine (Oxomwkn36 & [id number]) covered once after 65 Prevnar 13: 07/31/2020    [id number]: 09/30/2021     No recommendations at this time    Hepatitis B One screening covered for patients with certain risk factors   -  No recommendations at this time    Tetanus Toxoid Not covered by Medicare Part B unless medically necessary (cut with metal); may be covered with your pharmacy prescription benefits -    Tetanus, Diptheria and Pertusis TD and TDaP Not covered by Medicare Part B -  No recommendations at this time    Zoster Not covered by Medicare Part B; may be covered with your pharmacy  prescription benefits -  No recommendations at this time     Diabetes      Hemoglobin A1C Annually; if last result is elevated, may be asked to retest more frequently.    Medicare covers every 3 months Lab Results   Component Value Date     (H) 04/17/2024    A1C 6.8 (H) 04/17/2024       No recommendations at this time    Creat/alb ratio Annually Lab Results   Component Value Date    MICROALBCREA 86.9 (H) 04/17/2024    MALBCRECALC 17.4  04/03/2013       LDL Annually Lab Results   Component Value Date    LDL 86 04/17/2024       Dilated Eye Exam Annually Last Diabetic Eye Exam:  Last Dilated Eye Exam: 11/22/23  Eye Exam shows Diabetic Retinopathy?: No       Annual Monitoring of Persistent Medications (ACE/ARB, digoxin diuretics, anticonvulsants)    Potassium Annually Lab Results   Component Value Date    K 4.2 04/17/2024         Creatinine   Annually Lab Results   Component Value Date    CREATSERUM 1.41 (H) 04/17/2024         BUN Annually Lab Results   Component Value Date    BUN 25 (H) 04/17/2024       Drug Serum Conc Annually No results found for: \"DIGOXIN\", \"DIG\", \"VALP\"

## 2024-04-23 NOTE — PATIENT INSTRUCTIONS
Brinda Max's SCREENING SCHEDULE   Tests on this list are recommended by your physician but may not be covered, or covered at this frequency, by your insurer.   Please check with your insurance carrier before scheduling to verify coverage.   PREVENTATIVE SERVICES FREQUENCY &  COVERAGE DETAILS LAST COMPLETION DATE   Diabetes Screening    Fasting Blood Sugar /  Glucose    One screening every 12 months if never tested or if previously tested but not diagnosed with pre-diabetes   One screening every 6 months if diagnosed with pre-diabetes Lab Results   Component Value Date    GLU 96 04/17/2024        Cardiovascular Disease Screening    Lipid Panel  Cholesterol  Lipoprotein (HDL)  Triglycerides Covered every 5 years for all Medicare beneficiaries without apparent signs or symptoms of cardiovascular disease Lab Results   Component Value Date    CHOLEST 171 04/17/2024    HDL 67 (H) 04/17/2024    LDL 86 04/17/2024    TRIG 101 04/17/2024         Electrocardiogram (EKG)   Covered if needed at Welcome to Medicare, and non-screening if indicated for medical reasons 08/28/2023      Ultrasound Screening for Abdominal Aortic Aneurysm (AAA) Covered once in a lifetime for one of the following risk factors   • Men who are 65-75 years old and have ever smoked   • Anyone with a family history -     Colorectal Cancer Screening  Covered for ages 50-85; only need ONE of the following:    Colonoscopy   Covered every 10 years    Covered every 2 years if patient is at high risk or previous colonoscopy was abnormal 08/23/2022    Health Maintenance   Topic Date Due   • Colorectal Cancer Screening  08/23/2027       Flexible Sigmoidoscopy   Covered every 4 years -    Fecal Occult Blood Test Covered annually -   Bone Density Screening    Bone density screening    Covered every 2 years after age 65 if diagnosed with risk of osteoporosis or estrogen deficiency.    Covered yearly for long-term glucocorticoid medication use (Steroids) Last Dexa  Scan:    XR DEXA BONE DENSITOMETRY (CPT=77080) 08/12/2022      No recommendations at this time   Pap and Pelvic    Pap   Covered every 2 years for women at normal risk; Annually if at high risk -  No recommendations at this time    Chlamydia Annually if high risk -  No recommendations at this time   Screening Mammogram    Mammogram     Recommend annually for all female patients aged 40 and older    One baseline mammogram covered for patients aged 35-39 08/30/2023    Health Maintenance   Topic Date Due   • Mammogram  08/30/2024       Immunizations    Influenza Covered once per flu season  Please get every year 10/17/2023  No recommendations at this time    Pneumococcal Each vaccine (Vtfposg01 & [id number]) covered once after 65 Prevnar 13: 07/31/2020    [id number]: 09/30/2021     No recommendations at this time    Hepatitis B One screening covered for patients with certain risk factors   -  No recommendations at this time    Tetanus Toxoid Not covered by Medicare Part B unless medically necessary (cut with metal); may be covered with your pharmacy prescription benefits -    Tetanus, Diptheria and Pertusis TD and TDaP Not covered by Medicare Part B -  No recommendations at this time    Zoster Not covered by Medicare Part B; may be covered with your pharmacy  prescription benefits -  No recommendations at this time     Diabetes      Hemoglobin A1C Annually; if last result is elevated, may be asked to retest more frequently.    Medicare covers every 3 months Lab Results   Component Value Date     (H) 04/17/2024    A1C 6.8 (H) 04/17/2024       No recommendations at this time    Creat/alb ratio Annually Lab Results   Component Value Date    MICROALBCREA 86.9 (H) 04/17/2024    MALBCRECALC 17.4 04/03/2013       LDL Annually Lab Results   Component Value Date    LDL 86 04/17/2024       Dilated Eye Exam Annually [unfilled]     Annual Monitoring of Persistent Medications (ACE/ARB, digoxin diuretics,  anticonvulsants)    Potassium Annually Lab Results   Component Value Date    K 4.2 04/17/2024         Creatinine   Annually Lab Results   Component Value Date    CREATSERUM 1.41 (H) 04/17/2024         BUN Annually Lab Results   Component Value Date    BUN 25 (H) 04/17/2024       Drug Serum Conc Annually No results found for: \"DIGOXIN\", \"DIG\", \"VALP\"

## 2024-04-23 NOTE — TELEPHONE ENCOUNTER
Pt states she is  calling back regarding the name of the PT location in [place].    Dr. Huber spoke to the patient directly

## 2024-04-25 ENCOUNTER — TELEPHONE (OUTPATIENT)
Dept: FAMILY MEDICINE CLINIC | Facility: CLINIC | Age: 64
End: 2024-04-25

## 2024-04-25 DIAGNOSIS — M43.16 SPONDYLOLISTHESIS OF LUMBAR REGION: Primary | ICD-10-CM

## 2024-04-25 RX ORDER — TRAMADOL HYDROCHLORIDE 50 MG/1
50 TABLET ORAL EVERY 6 HOURS PRN
Qty: 30 TABLET | Refills: 0 | Status: SHIPPED | OUTPATIENT
Start: 2024-04-25

## 2024-04-25 NOTE — TELEPHONE ENCOUNTER
Called and spoke with patient who states she has a back issue that DS is aware of.  She was able to move her MRI up to tomorrow.    Is asking if DS can give her something for pain.  States she sat at the table all night last night.    Has tried tylenol arthritis  Broke down and took tramadol left over from knee surgery.  States it took the edge off for a few hours.    Uses [facility] on [address]

## 2024-04-25 NOTE — TELEPHONE ENCOUNTER
Patient called to speak with a nurse from Dr. Huber's office. Patient states she is not sleeping and is in pain, so she would like to discuss pain medication for treatment. Patient does have an MRI scheduled for 4/26/2024 where she was told not to take any pain meds.     Please advise, thank you!

## 2024-04-26 ENCOUNTER — HOSPITAL ENCOUNTER (OUTPATIENT)
Dept: MRI IMAGING | Age: 64
Discharge: HOME | End: 2024-04-26
Attending: FAMILY MEDICINE
Payer: MEDICARE

## 2024-04-26 DIAGNOSIS — M54.41 ACUTE RIGHT-SIDED LOW BACK PAIN WITH RIGHT-SIDED SCIATICA: ICD-10-CM

## 2024-04-26 DIAGNOSIS — M43.16 LUMBAR ADJACENT SEGMENT DISEASE WITH SPONDYLOLISTHESIS: ICD-10-CM

## 2024-04-26 DIAGNOSIS — M43.16 SPONDYLOLISTHESIS OF LUMBAR REGION: Primary | ICD-10-CM

## 2024-04-26 DIAGNOSIS — M51.36 LUMBAR ADJACENT SEGMENT DISEASE WITH SPONDYLOLISTHESIS: ICD-10-CM

## 2024-04-26 PROCEDURE — 72148 MRI LUMBAR SPINE W/O DYE: CPT | Performed by: FAMILY MEDICINE

## 2024-04-26 NOTE — PROGRESS NOTES
Notified of results, she has some pretty significant shifting of the Lumbar spine at the L4-5 level and disc herniation at L4-5 and L5-S1 causing compression of the spinal canal and severe bilateral nerve root compression. Placed a referral for her to see [facility] for a consult as soon as she can get in, in the meantime no lifting bending etc, hold off on PT until she sees them. Patient aware

## 2024-04-29 ENCOUNTER — OFFICE VISIT (OUTPATIENT)
Dept: SURGERY | Facility: CLINIC | Age: 64
End: 2024-04-29
Payer: COMMERCIAL

## 2024-04-29 VITALS
HEART RATE: 80 BPM | SYSTOLIC BLOOD PRESSURE: 120 MMHG | WEIGHT: 176 LBS | HEIGHT: 60 IN | BODY MASS INDEX: 34.55 KG/M2 | DIASTOLIC BLOOD PRESSURE: 60 MMHG

## 2024-04-29 DIAGNOSIS — M47.26 OSTEOARTHRITIS OF SPINE WITH RADICULOPATHY, LUMBAR REGION: ICD-10-CM

## 2024-04-29 DIAGNOSIS — M54.16 LUMBAR BACK PAIN WITH RADICULOPATHY AFFECTING RIGHT LOWER EXTREMITY: Primary | ICD-10-CM

## 2024-04-29 DIAGNOSIS — M47.816 LUMBAR SPONDYLOSIS: ICD-10-CM

## 2024-04-29 NOTE — PROGRESS NOTES
New patient:  Reason for visit: lower back pain     Estimated time of onset:  5 week(s)    Numeric Rating Scale: (No Pain) 0  to  10 (Worst Pain)        Pain at Present:  2/10                                                                                                                       Distribution of Pain:    bilateral has N/T in her right leg and a throbbing pain     Past Treatments for Current Pain Condition:   NSAIDS and Other injections 3.2024  Response to treatment: no relief    Prior diagnostic testing related to this condition:    MRI LUMBAR

## 2024-04-29 NOTE — PATIENT INSTRUCTIONS
Refill policies:    Allow 2-3 business days for refills; controlled substances may take longer.  Contact your pharmacy at least 5 days prior to running out of medication and have them send an electronic request or submit request through the “request refill” option in your [employer] account.  Refills are not addressed on weekends; covering physicians do not authorize routine medications on weekends.  No narcotics or controlled substances are refilled after noon on Fridays or by on call physicians.  By law, narcotics must be electronically prescribed.  A 30 day supply with no refills is the maximum allowed.  If your prescription is due for a refill, you may be due for a follow up appointment.  To best provide you care, patients receiving routine medications need to be seen at least once a year.  Patients receiving narcotic/controlled substance medications need to be seen at least once every 3 months.  In the event that your preferred pharmacy does not have the requested medication in stock (e.g. Backordered), it is your responsibility to find another pharmacy that has the requested medication available.  We will gladly send a new prescription to that pharmacy at your request.    Scheduling Tests:    If your physician has ordered radiology tests such as MRI or CT scans, please contact Central Scheduling at [phone] right away to schedule the test.  Once scheduled, the [facility] Centralized Referral Team will work with your insurance carrier to obtain pre-certification or prior authorization.  Depending on your insurance carrier, approval may take 3-10 days.  It is highly recommended patients assure they have received an authorization before having a test performed.  If test is done without insurance authorization, patient may be responsible for the entire amount billed.      Precertification and Prior Authorizations:  If your physician has recommended that you have a procedure or additional testing performed the [facility]  Centralized Referral Team will contact your insurance carrier to obtain pre-certification or prior authorization.    You are strongly encouraged to contact your insurance carrier to verify that your procedure/test has been approved and is a COVERED benefit.  Although the [facility] Centralized Referral Team does its due diligence, the insurance carrier gives the disclaimer that \"Although the procedure is authorized, this does not guarantee payment.\"    Ultimately the patient is responsible for payment.   Thank you for your understanding in this matter.  Paperwork Completion:  If you require FMLA or disability paperwork for your recovery, please make sure to either drop it off or have it faxed to our office at [phone]. Be sure the form has your name and date of birth on it.  The form will be faxed to our Forms Department and they will complete it for you.  There is a 25$ fee for all forms that need to be filled out.  Please be aware there is a 10-14 day turnaround time.  You will need to sign a release of information (CHANDRA) form if your paperwork does not come with one.  You may call the Forms Department with any questions at [phone].  Their fax number is [phone].

## 2024-04-29 NOTE — PROGRESS NOTES
[facility]  Neurosurgery Consultation  2024    Brinda Max PCP:  Curtis Huber DO    1960 MRN [id number]     Reason for Visit: Lumbar radiculopathy     HPI     Brinda Max is a very pleasant 64 year old female who presents today for Neurosurgical consultation for low back pain. She is accompanied by her .     Patient reports acute onset of right-sided low back pain and right hip pain 5 weeks ago. Denies trauma or inciting event. Pain occasionally radiates into the R buttock and down the anterior and lateral RLE, from thigh to toes. Her right great toe is numb. She reports perceived weakness of the RLE and is limping. She has also noticed that her R ankle and R foot have been swollen for the last 2 days. Pain is relieved by sitting, aggravated by prolonged standing/walking. She presented to IC and ED for her complaints and was prescribed oral steroids and Tramadol, which did not offer relief. Her PCP ordered a MRI and XR of the lumbar spine which shows multilevel DJD; spondylolisthesis at L4-5 with resultant B/L neural foraminal narrowing; and an extruded disc on the right at L5-S1 causing severe right neural foraminal narrowing.     She denies changes in bowel/bladder habits or saddle anesthesia.  No prior injections, PT, or spine surgeries.     HISTORY     Past Medical History:    Arthritis    Knees bone on bone    Back problem    Cancer (HCC)    Easy bruising    ASA 81 mg daily    Glomerulonephritis    Hemorrhoids    Pregnancy    History of COVID-19    fatigue. not hospitalized. no continued symptoms    Kidney failure    Transplant 02    Osteoarthritis    Other and unspecified hyperlipidemia    Pain in joints    Type II or unspecified type diabetes mellitus without mention of complication, not stated as uncontrolled    Unspecified essential hypertension    Visual impairment    glasses / contacts    Wears glasses      Past Surgical History:   Procedure Laterality  Date    Colonoscopy  2013, 2017    Organ transplant  2002    Kidney    Other surgical history  age 42    Renal transplant    Tonsillectomy  1964    Transplantation of kidney      4/25/02 at [place]      Family History   Problem Relation Age of Onset    Other (Other) Mother         alzheimers    Diabetes Mother         Type 1    Other Mother         Parkinsons    Other (Other) Self 54        basal skin cancer      Social History     Socioeconomic History    Marital status:    Tobacco Use    Smoking status: Never    Smokeless tobacco: Never   Vaping Use    Vaping status: Never Used   Substance and Sexual Activity    Alcohol use: No    Drug use: No      Allergies   Allergen Reactions    Adhesive Tape OTHER (SEE COMMENTS)     Caused redness after being on for a length of time    Ace Inhibitors Coughing        CURRENT MEDICATIONS     Current Outpatient Medications   Medication Sig Dispense Refill    traMADol 50 MG Oral Tab Take 1 tablet (50 mg total) by mouth every 6 (six) hours as needed for Pain. 30 tablet 0    losartan 50 MG Oral Tab Take 1 tablet (50 mg total) by mouth daily. 100 tablet 2    cycloSPORINE non-modified 25 MG Oral Cap TAKE 3 CAPSULES BY MOUTH EVERY  MORNING AND 2 CAPSULES EVERY  EVENING 450 capsule 3    predniSONE 5 MG Oral Tab Take 1 tablet (5 mg total) by mouth daily. 90 tablet 1    ASPIRIN LOW DOSE 81 MG Oral Tab EC Take 1 tablet (81 mg total) by mouth daily.      Mycophenolate Mofetil 500 MG Oral Tab Take 2 tablets (1,000 mg total) by mouth 2 (two) times daily. 360 tablet 3    atorvastatin 20 MG Oral Tab Take 1 tablet (20 mg total) by mouth daily. 90 tablet 3    Glucose Blood In Vitro Strip 1 each by Other route daily. 90 each 3    magnesium oxide 400 MG Oral Tab Take 1 tablet (400 mg total) by mouth 2 (two) times daily.      multivitamin Oral Tab Take 1 tablet by mouth daily.      Cinnamon 500 MG Oral Cap Take 1,000 mg by mouth 2 (two) times daily.      Omega 3 1000 MG Oral Cap Take 2,000 mg  by mouth 2 (two) times daily.        Cholecalciferol (VITAMIN D) 1000 UNITS Oral Cap Take 1 capsule by mouth 2 (two) times daily.          REVIEW OF SYSTEMS     Comprehensive review of systems done. Negative except what is outlined in the above HPI.     PHYSICAL EXAMIMATION     VITALS:  height is 60\" and weight is 176 lb (79.8 kg). Her blood pressure is 120/60 and her pulse is 80.     GENERAL: No apparent distress, non-toxic appearing. Sitting comfortably in the examination chair.     MUSCULOSKELETAL: Even tone. Moving bilateral upper and lower extremities spontaneously and against resistance.    NEURO: Awake, alert and oriented x3. Gait antalgic. Intact toe and heel balance on one foot bilaterally, reports equal strength. Sensation to light touch on bilateral upper and lower extremities intact.     LOWER EXTREMITY STRENGTH:    Iliospoas  Hamstrings  Quads  D-flexion  P-flexion EHL     Right 5 5 5 5 5 5     Left 5 5 5 5 5 5      DTRs:       Biceps    Triceps   Brachioradialis     Patellar     Ankle    Right       2+         2+            2+         2+        2+    Left       2+         2+             2+         2+        2+      IMAGING:     MRI SPINE LUMBAR (CPT=72148)    Result Date: 4/26/2024  CONCLUSION:   1. L5-S1 right lateral disc herniation extending into the right neural foramen with mass effect on the right L5 nerve root causing severe right neural foraminal narrowing.  There is moderate bilateral facet joint degenerative changes at the L5-S1 level.  2. L4-5 grade 2 anterolisthesis of L4 on L5 secondary to severe left greater than right facet joint degenerative change.  There is degenerative disc bulge contributing to moderate bilateral subarticular zone and neural foraminal narrowing and moderate central canal stenosis.   LOCATION:  [facility]   Dictated by (CST): Navi Hoffmann MD on 4/26/2024 at 11:12 AM     Finalized by (CST): Navi Hoffmann MD on 4/26/2024 at 11:18 AM       XR LUMBAR SPINE (MIN 4 VIEWS)  (CPT=72110)    Result Date: 4/23/2024  CONCLUSION:    No sign of compression or fracture.  Advanced disc degeneration with advanced disc narrowing L4-L5, with advanced arthropathy present L4-5 and L5-S1, these facet arthropathy changes appear increased since the prior.  There is grade 2 degenerative appearing anterior subluxation L4 upon L5 with subluxation increasing since the prior, 13 mm of subluxation.  Mild scoliosis concave to the left.  LOCATION:  [facility]   Dictated by (CST): Lewis Hansen MD on 4/23/2024 at 10:09 AM     Finalized by (CST): Lewis Hansen MD on 4/23/2024 at 10:11 AM         MEDICAL DECISION MAKING:     ASSESSMENT:  1. Lumbar back pain with radiculopathy affecting right lower extremity      I reviewed the imaging with patient and her  at bedside. She presents with significant LBP and RLE radiculopathy. She currently has no weakness or bowel/bladder dysfunctions. Dr. Hdez discussed options for management, including conservative treatment versus surgical intervention. Her symptoms have been refractory to medication therapy thus far, therefore will refer to Pain Management for consideration of injections. If no improvement with this, can return for discussion of surgical intervention as outlined below.     PLAN:   Referral to Pain Management for consideration of right L5-S1 TFESI   Recommend L4-L5, L5-S1 ALIF with posterior fusion pending relief with above   F/U with Dr. Hdez after injections    Total visit time: 40 minutes; More than 50% spent coordinating care, counseling, reviewing imaging and discussing medication therapy.       Roxana Mo, MSN, APRN, FNP-[facility]  Neurosurgery   [address] [place] [address], [address]  [place], [place] [address] [phone]

## 2024-05-01 ENCOUNTER — TELEPHONE (OUTPATIENT)
Dept: PHYSICAL THERAPY | Facility: HOSPITAL | Age: 64
End: 2024-05-01

## 2024-05-02 ENCOUNTER — OFFICE VISIT (OUTPATIENT)
Dept: PHYSICAL THERAPY | Age: 64
End: 2024-05-02
Attending: FAMILY MEDICINE
Payer: MEDICARE

## 2024-05-02 DIAGNOSIS — M54.41 ACUTE RIGHT-SIDED LOW BACK PAIN WITH RIGHT-SIDED SCIATICA: Primary | ICD-10-CM

## 2024-05-02 PROCEDURE — 97110 THERAPEUTIC EXERCISES: CPT

## 2024-05-02 PROCEDURE — 97112 NEUROMUSCULAR REEDUCATION: CPT

## 2024-05-02 PROCEDURE — 97161 PT EVAL LOW COMPLEX 20 MIN: CPT

## 2024-05-02 NOTE — PROGRESS NOTES
SPINE EVALUATION:     Diagnosis:   Acute right-sided low back pain with right-sided sciatica (M54.41)      Referring Provider: Curtis Huber  Date of Evaluation:    5/2/2024    Precautions:   Cancer Next MD visit:   none scheduled  Date of Surgery: n/a     PATIENT SUMMARY   Brinda Max is a 64 year old female who presents to therapy today with complaints of low back pain and down front of right leg toes and right foot swelling.    Constant pain.    Pt describes pain level at worst 9/10.   Current functional limitations include walking, standing, sitting, housework. Sleeping (prefers stomach or side)    Better: flexion in sitting    Describes obstruction    Brinda describes prior level of function no issue prior to 7 weeks ago, now uses a cane or walker due to pain. Pt goals include reduce pain.  Past medical history was reviewed with Brinda. Significant findings include   Past Medical History:    Arthritis    Knees bone on bone    Back problem    Cancer (HCC)    Easy bruising    ASA 81 mg daily    Glomerulonephritis    Hemorrhoids    Pregnancy    History of COVID-19    fatigue. not hospitalized. no continued symptoms    Kidney failure    Transplant 4/25/02    Osteoarthritis    Other and unspecified hyperlipidemia    Pain in joints    Type II or unspecified type diabetes mellitus without mention of complication, not stated as uncontrolled    Unspecified essential hypertension    Visual impairment    glasses / contacts    Wears glasses       Pt denies diplopia, dysarthria, dysphasia, dizziness, drop attacks, bowel/bladder changes, saddle anesthesia, and KATHERINE LE N/T.    MRI:  Her PCP ordered a MRI and XR of the lumbar spine which shows multilevel DJD; spondylolisthesis at L4-5 with resultant B/L neural foraminal narrowing; and an extruded disc on the right at L5-S1 causing severe right neural foraminal narrowing.       ASSESSMENT  Brinda presents to physical therapy evaluation with primary c/o back and leg pain. The  results of the objective tests and measures show limited ROM and pain with movement.  Functional deficits include but are not limited to walking, standing, sitting, housework.  Signs and symptoms are consistent with diagnosis of mechanical low back pain. Pt and PT discussed evaluation findings, pathology, POC and HEP.  Pt voiced understanding and performs HEP correctly without reported pain. Skilled Physical Therapy is medically necessary to address the above impairments and reach functional goals.     OBJECTIVE:   Observation/Posture: worse  Neuro Screen: sensation, 0% great toe sensation on R side.  Slump min/mod bilaterally, R*, L no pain  Femoral nerve test: R min*, L min/nil    Lumbar AROM: (* denotes performed with pain)  Flexion: min  Extension: min  Sidebending: R min; L min (pain with shift correction either direction, min loss)*    Accessory motion: intact    Strength: (* denotes performed with pain)  LE   Hip flexion (L2): R 5/5; L 5/5  Hip abduction: R 3-/5; L 3-/5  Hip Extension: R 5/5; L 5/5   Hip ER: R 5/5; L 5/5  Hip IR: R 5/5; L 5/5  Knee Flexion: R 5/5; L 5/5   Knee extension (L3): R 5/5; L 5/5   DF (L4): R 5/5; L 5/5  Great Toe Ext (L5): R 5/5, L 5/5  PF (S1): R 2+/5*; L 5/5     Flexibility:   LE   Hip Flexor: R mod, L mod  Hamstrings: R mod; L mod  Piriformis: R min; L min  Quads: R mod; L mod  Gastroc-soleus: R min; L min     Special tests:   Extension/rotation mobilization bilaterally: intact (no pain any segment)    Gait: pt ambulates on level ground with antalgia.    Today’s Treatment and Response:   Flexion in lying x10: worse  Extension in standing 10x2 (progressed to fulcrum L5): no effect  Flexion/rotation Knees to the R 3 minutes: worse  -to the L 3 minutes: worse  R side glide (hips to the L) x10 doorway and shift correction x10: worse   Prone press up x10 x2: 2nd set overpressure    Prone press ups hips to the L x10: better          Pt education was provided on exam findings, treatment  diagnosis, treatment plan, expectations, and prognosis. Pt was also provided recommendations for importance of remaining active  Patient was instructed in and issued a HEP for: Prone press ups hips to the L x15    Charges: PT Eval Low Complexity, 1 there ex, 1 neuro re-education      Total Timed Treatment: 23 min     Total Treatment Time: 38 min     PLAN OF CARE:    Goals: (to be met in 10 visits)   Pt will improve MATHEW score from 54/100 to 27/100 to display improved ability to walking, standing, sitting, housework  Pt will reduce pain at its worst from 9/10 to 6/10 to allow for improved ability to walking, standing, sitting, housework  Pt will plantar flexion strength from 2+/5 with pain to 3+/5 without pain allow for improved ability to walking, standing, sitting, housework  Pt will be independent with home program to allow for maintenance of goals achieved in therapy.      Frequency / Duration: Patient will be seen for 2 x/week or a total of 10 visits over a 90 day period. Treatment will include: Manual Therapy, Neuromuscular Re-education, Self-Care Home Management, Therapeutic Activities, Therapeutic Exercise, and Home Exercise Program instruction    Education or treatment limitation: None  Rehab Potential:good    Oswestry Disability Index Score  Score: 54 % (5/1/2024 11:48 AM)      Patient/Family/Caregiver was advised of these findings, precautions, and treatment options and has agreed to actively participate in planning and for this course of care.    Thank you for your referral. Please co-sign or sign and return this letter via fax as soon as possible to [phone]. If you have any questions, please contact me at Dept: [phone]    Sincerely,  Electronically signed by therapist: Cricket Merritt PT    Physician's certification required: Yes  I certify the need for these services furnished under this plan of treatment and while under my care.    X___________________________________________________  Date____________________    Certification From: 5/2/2024  To:7/31/2024

## 2024-05-03 ENCOUNTER — OFFICE VISIT (OUTPATIENT)
Dept: PAIN CLINIC | Facility: CLINIC | Age: 64
End: 2024-05-03
Payer: COMMERCIAL

## 2024-05-03 VITALS — DIASTOLIC BLOOD PRESSURE: 78 MMHG | HEART RATE: 102 BPM | OXYGEN SATURATION: 98 % | SYSTOLIC BLOOD PRESSURE: 124 MMHG

## 2024-05-03 DIAGNOSIS — M51.26 HNP (HERNIATED NUCLEUS PULPOSUS), LUMBAR: Primary | ICD-10-CM

## 2024-05-03 DIAGNOSIS — M54.16 LUMBAR RADICULITIS: ICD-10-CM

## 2024-05-03 PROCEDURE — 3078F DIAST BP <80 MM HG: CPT | Performed by: PHYSICIAN ASSISTANT

## 2024-05-03 PROCEDURE — 99204 OFFICE O/P NEW MOD 45 MIN: CPT | Performed by: PHYSICIAN ASSISTANT

## 2024-05-03 PROCEDURE — 3074F SYST BP LT 130 MM HG: CPT | Performed by: PHYSICIAN ASSISTANT

## 2024-05-03 PROCEDURE — 1160F RVW MEDS BY RX/DR IN RCRD: CPT | Performed by: PHYSICIAN ASSISTANT

## 2024-05-03 PROCEDURE — 1159F MED LIST DOCD IN RCRD: CPT | Performed by: PHYSICIAN ASSISTANT

## 2024-05-03 NOTE — PROGRESS NOTES
Patient: Brinda Max  Medical Record Number: [id number]  Site: [facility]  Referring Physician:  Roxana Mo  PCP: Dr. Huber    Dear Dr. Mo:    Thank you very much for requesting this consultation. I had the opportunity to evaluate and initiate care for your patient today, as per your request.    HISTORY OF CHIEF COMPLAINT:      Brinda Max is a 64 year old female, who complains of right low back and radicular lower extremity pain along the posterior lateral thigh to lateral lower leg.    Patient is here today with pain in above-described distribution that has been ongoing since 3/21/24.  States that she was putting groceries away, and had acute onset of pain.  Initially, ,she was told that it was piriformis syndrome, though as pain progressed, was seen in [facility].  Given toradol injection, and sent home.  Went to ER following day, and was sent for XR, and was discharged with PO steroid and opiates.  She did have relief with steroid, though as she tapered the dose, pain returned.  She was evaluated by PCP, and was sent for MRI, which did confirm HNP at L5/S1, as well as L4/5 spondylolisthesis.  She was sent for surgical evaluation, and here for consideration of injection.      VAS Pain Score:  8/10    Aggravating Factors: Relieving Factors:   Standing  Lying down  Lying in recliner      Past Treatment Attempted/Patient’s Response:  As above     Past Medical History:    Arthritis    Knees bone on bone    Back problem    Cancer (HCC)    Easy bruising    ASA 81 mg daily    Glomerulonephritis    Hemorrhoids    Pregnancy    History of COVID-19    fatigue. not hospitalized. no continued symptoms    Kidney failure    Transplant 4/25/02    Osteoarthritis    Other and unspecified hyperlipidemia    Pain in joints    Type II or unspecified type diabetes mellitus without mention of complication, not stated as uncontrolled    Unspecified essential hypertension    Visual impairment    glasses / contacts     Wears glasses      Past Surgical History:   Procedure Laterality Date    Colonoscopy  2013, 2017    Organ transplant  2002    Kidney    Other surgical history  age 42    Renal transplant    Tonsillectomy  1964    Transplantation of kidney      4/25/02 at [facility]      Family History   Problem Relation Age of Onset    Other (Other) Mother         alzheimers    Diabetes Mother         Type 1    Other Mother         Parkinsons    Other (Other) Self 56        basal skin cancer      Social History     Socioeconomic History    Marital status:    Tobacco Use    Smoking status: Never    Smokeless tobacco: Never   Vaping Use    Vaping status: Never Used   Substance and Sexual Activity    Alcohol use: No    Drug use: No   Other Topics Concern    Caffeine Concern Yes    Exercise No      Current Medications:  Current Outpatient Medications   Medication Sig Dispense Refill    traMADol 50 MG Oral Tab Take 1 tablet (50 mg total) by mouth every 6 (six) hours as needed for Pain. 30 tablet 0    losartan 50 MG Oral Tab Take 1 tablet (50 mg total) by mouth daily. 100 tablet 2    cycloSPORINE non-modified 25 MG Oral Cap TAKE 3 CAPSULES BY MOUTH EVERY  MORNING AND 2 CAPSULES EVERY  EVENING 450 capsule 3    predniSONE 5 MG Oral Tab Take 1 tablet (5 mg total) by mouth daily. 90 tablet 1    ASPIRIN LOW DOSE 81 MG Oral Tab EC Take 1 tablet (81 mg total) by mouth daily.      Mycophenolate Mofetil 500 MG Oral Tab Take 2 tablets (1,000 mg total) by mouth 2 (two) times daily. 360 tablet 3    atorvastatin 20 MG Oral Tab Take 1 tablet (20 mg total) by mouth daily. 90 tablet 3    Glucose Blood In Vitro Strip 1 each by Other route daily. 90 each 3    magnesium oxide 400 MG Oral Tab Take 1 tablet (400 mg total) by mouth 2 (two) times daily.      multivitamin Oral Tab Take 1 tablet by mouth daily.      Cinnamon 500 MG Oral Cap Take 1,000 mg by mouth 2 (two) times daily.      Omega 3 1000 MG Oral Cap Take 2,000 mg by mouth 2 (two) times daily.         Cholecalciferol (VITAMIN D) 1000 UNITS Oral Cap Take 1 capsule by mouth 2 (two) times daily.          Functional Assessment: Patient reports that they are able to complete all of their ADL's such as eating, bathing, using the toilet, dressing and getting up from a bed or a chair independently.    Work History:  The patient currently is retired.    REVIEW OF SYSTEMS:   10 point review of systems is otherwise negative,unless otherwise in HPI.      Radiology/Lab Test Reviewed: MRI L-spine 4/26/2024:    CONCLUSION:       1. L5-S1 right lateral disc herniation extending into the right neural foramen with mass effect on the right L5 nerve root causing severe right neural foraminal narrowing.  There is moderate bilateral facet joint degenerative changes at the L5-S1 level.      2. L4-5 grade 2 anterolisthesis of L4 on L5 secondary to severe left greater than right facet joint degenerative change.  There is degenerative disc bulge contributing to moderate bilateral subarticular zone and neural foraminal narrowing and moderate central canal stenosis.             CBC:    Lab Results   Component Value Date    WBC 6.9 04/17/2024    WBC 10.2 08/28/2023    WBC 6.6 07/19/2023   No results found for: \"HEMOGLOBIN\"  Lab Results   Component Value Date    .0 04/17/2024    .0 08/28/2023    .0 07/19/2023       PHYSICAL EXAMIMATION   PHYSICAL EXAMINATION: Brinda Max is a 64 year old female who is observed sitting comfortably on a chair in the exam room alert and oriented times three. She looks consistent with her stated age.    Ht Readings from Last 1 Encounters:   04/29/24 60\"     Wt Readings from Last 1 Encounters:   04/29/24 176 lb (79.8 kg)     The patient is well developed, well nourished, well muscled, obese. She moves independently from sitting to standing with ease.       Inspection:  No acute distress    Patient displays Antalgic gait, and is able to Normal heel walk, Normal toe  walk.    Coordination:  Well-coordinated, fluent gait, able to engage in rapid alternating movements of upper and lower extremities.    ROM Lumbar Spine:  See chart below:  Motion Right (+ or -) Left (+ or -)   Lumbar Flexion + -   Lumbar Extension - -   Lumbar Bending - -   Lumbar Extension/ twisting motion - -     Lumbar/Sacral Integument:  Skin over lumbar sacral spine is intact without rashes, excoriations, lesions or erythema noted    Palpation:  See chart below:  Palpation of lumbar area Right (+ or -) Left (+ or -)   Lumbar facets - -   Lumbar paraspinals - -   Piriformis - -   SIJ - -   Trochanteric Bursa - -     Strength:  Strength deficits noted:  4-/5 R EHL, 5/5 otherwise     Sensation:  No sensory deficits noted on bilateral lower extremities to light touch    Tests:  Test Right (+ or -) Left (+ or -)   SLR - -   Guille’s     Babinski     Clonus       HEAD/NECK: Head is normocephalic, neck supple  EYES: EOMI, LUPE  SKIN EXAM: Skin is intact, head, neck, trunk and arms/legs. No rashes, mottling or ulcerations.  LYMPH EXAM: There is no lymph edema in either lower extremity.  VASCULAR EXAM: Pedal pulses are normal bilaterally, with good distal perfusion. No clubbing or cyanosis.  ABDOMINAL EXAM: Abdomen is soft without masses palpated.  HEART: normal, regular, S1 and S2  LUNGS:CTA  MUSCULOSKELETAL: Smooth, pain-free ROM to bilat hips, ankles, and knees.     Do you have any known blood/bleeding disorders?  No  Does patient currently take blood thinners?   None  Does patient currently take any antibiotics?   No      Patient is currently on pain medications:  No  Reason pain medications are prescribed: N/A  Pain medications are prescribed by: N/A  [place] Prescription Monitoring review: N/A  DIRE: N/A  Treatment decision: N/A    MEDICAL DECISION MAKING:     Impression: L4-5 anterolisthesis, right L5-S1 HNP with L5 compression, right L5 radiculopathy    Patient has right low back and radicular lower  extremity pain in a dermatomal pattern to the great toe in the setting of L4-5 spondylolisthesis with resultant stenosis, as well as a right foraminal L5-S1 HNP with L5 compression, in keeping with her complaints.  Symptoms progressively present over the last 1.5 months, and has failed time, medication, activity modification, and physical therapy.  She has been evaluated by neurosurgery, and was sent for consideration of injections prior to more aggressive option.    On exam, does have significant weakness of the right EHL (4 -/5) with pain on range of motion lumbar spine.  Negative straight leg raise.    Will proceed with a right L5-S1 transforaminal epidural steroid injection, risks and benefits of which were reviewed in detail.  Follow-up 2 to 3 weeks thereafter.  If she has not significant relief, would have her return to surgery to discuss more definitive options.    Plan: Right L5-S1 TF-AZ.  Follow-up 2 to 3 weeks.    The patient indicates understanding of these issues and agrees to the plan.      Thank you very much.     Respectfully yours,    JOSÉ MIGUEL Ralph

## 2024-05-03 NOTE — PATIENT INSTRUCTIONS
Refill policies:    Allow 2-3 business days for refills; controlled substances may take longer.  Contact your pharmacy at least 5 days prior to running out of medication and have them send an electronic request or submit request through the “request refill” option in your [employer] account.  Refills are not addressed on weekends; covering physicians do not authorize routine medications on weekends.  No narcotics or controlled substances are refilled after noon on Fridays or by on call physicians.  By law, narcotics must be electronically prescribed.  A 30 day supply with no refills is the maximum allowed.  If your prescription is due for a refill, you may be due for a follow up appointment.  To best provide you care, patients receiving routine medications need to be seen at least once a year.  Patients receiving narcotic/controlled substance medications need to be seen at least once every 3 months.  In the event that your preferred pharmacy does not have the requested medication in stock (e.g. Backordered), it is your responsibility to find another pharmacy that has the requested medication available.  We will gladly send a new prescription to that pharmacy at your request.    Scheduling Tests:    If your physician has ordered radiology tests such as MRI or CT scans, please contact Central Scheduling at [phone] right away to schedule the test.  Once scheduled, the [facility] Centralized Referral Team will work with your insurance carrier to obtain pre-certification or prior authorization.  Depending on your insurance carrier, approval may take 3-10 days.  It is highly recommended patients assure they have received an authorization before having a test performed.  If test is done without insurance authorization, patient may be responsible for the entire amount billed.      Precertification and Prior Authorizations:  If your physician has recommended that you have a procedure or additional testing performed the [facility]  Centralized Referral Team will contact your insurance carrier to obtain pre-certification or prior authorization.    You are strongly encouraged to contact your insurance carrier to verify that your procedure/test has been approved and is a COVERED benefit.  Although the [facility] Centralized Referral Team does its due diligence, the insurance carrier gives the disclaimer that \"Although the procedure is authorized, this does not guarantee payment.\"    Ultimately the patient is responsible for payment.   Thank you for your understanding in this matter.  Paperwork Completion:  If you require FMLA or disability paperwork for your recovery, please make sure to either drop it off or have it faxed to our office at [phone]. Be sure the form has your name and date of birth on it.  The form will be faxed to our Forms Department and they will complete it for you.  There is a 25$ fee for all forms that need to be filled out.  Please be aware there is a 10-14 day turnaround time.  You will need to sign a release of information (CHANDRA) form if your paperwork does not come with one.  You may call the Forms Department with any questions at [phone].  Their fax number is [phone].

## 2024-05-03 NOTE — PROGRESS NOTES
Patient presents in office today with reported pain in R LB and right hip radiating to right buttocks down anterior and lateral RLE    Current pain level reported = 3/10    Last reported dose of 9a Tylenol       Narcotic Contract renewal NA    Urine Drug screen NA

## 2024-05-03 NOTE — PROGRESS NOTES
Location of Pain: R LB down R LE    Date Pain Began: 3/21/24          Work Related:   No        Receiving Work Comp/Disability:   No    Numeric Rating Scale:  Pain at Present:  3                                                                                                            (No Pain) 0  to  10 (Worst Pain)                 Minimum Pain:   2  Maximum Pain  9    Distribution of Pain:    right    Quality of Pain:   aching, numbness, throbbing, and tingling    Origin of Pain:    Other Putting Groceries Away    Aggravating Factors:    Walking and Other Laying Down    Past Treatments for Current Pain Condition:   Physical Therapy and Other Tramadol    Prior diagnostic testing for your pain:  XRay

## 2024-05-06 ENCOUNTER — TELEPHONE (OUTPATIENT)
Dept: PAIN CLINIC | Facility: CLINIC | Age: 64
End: 2024-05-06

## 2024-05-06 DIAGNOSIS — M54.16 LUMBAR RADICULITIS: Primary | ICD-10-CM

## 2024-05-06 NOTE — TELEPHONE ENCOUNTER
Prior authorization request completed for: Right L5/S1 TLESI   Authorization #Notification or Prior Authorization is not required for the requested services.  You are not required to submit a notification/prior authorization based on the information provided.   Decision ID #: [id number]    Authorization dates: n/a   CPT codes approved: 80519  Number of visits/dates of service approved: 1  Physician: Jeevan  Location: [facility] [place]     Patient can be scheduled. Routed to Navigator.

## 2024-05-06 NOTE — TELEPHONE ENCOUNTER
Patient advised of insurance approval to proceed with injections and is agreeable to scheduling. Patient scheduled for procedure, pre-procedure instructions reviewed. Patient prefers Local sedation. Reviewed sedation instructions including No Fasting and No  Required. Patient encouraged but not required to hold ASA 81 mg for 24 hours and Omega-3 for 5 days prior to procedure. Patient verbalized understanding of instructions, no further needs at this time.      Patient requested to be contacted if there are any sooner openings/cancellations.       [facility]  PRE-PROCEDURE INSTRUCTIONS WITHOUT SEDATION    Procedure: Right L5/S1 TLESI       Appointment Date: 05/20/2024  Check-In Time: 09:15 AM    Follow-Up Date/Time w/Mike UrbinaPA : 06/04/2024 @ 01:00 PM    Prior to the procedure:  Please update us prior to the procedure if you are experiencing any symptoms of infection such as cough, fever, chills, urinary symptoms, or have recently been prescribed antibiotics, have open wounds, have recently had surgery or dental procedures.    Day of Procedure:  **Drivers will be required for patients who receive prescriptions for Valium.    NO FASTING REQUIRED  Please bring your Insurance Card, Photo ID, List of Current Medications and Referral (if applicable) to your appointment.  Please park in the [facility] and follow the signs to the hospitals.  Check in at [facility] ([address] [place] [address]) outpatient registration in the [facility].  Please note-No prescriptions will be written by Pain Clinic in OR on the day of procedure. If you require a refill of medications, please contact the office 48 hours prior to your procedure.  If you have an implanted Spinal Cord or Peripheral Nerve Stimulator: Please remember to turn device off for procedure.        Medication Hold:    Number of days you need to be off for the following medications:    Aggrenox 10 days   Agrylin  (Anagrelide) 10 days  Brilinta (Ticagrelor) 7 days  Imbruvica (Ibrutinib) 3 days   Enbrel (Etanercept) 24 hours   Fragmin (Dalteparin) 24 hours   Pletal (Cilostazol) 7 days  Effient (Prasugrel) 7 days  Pradaxa 10 days  Trental 7 days  Eliquis (Apixaban) 3 days  Xarelto (Rivaroxaban) 3 days  Lovenox (Enoxaparin) 24 hours  Aspirin  Greater than 81mg but less than 325mg   5 days  325mg and greater                  7 days  Coumadin       5 days  Procedure may be cancelled if INR is elevated.   Excedrin (with aspirin) 7 days  Plavix (Clopidogrel)                            7 days    NSAIDs: 24 hours preferred      Ibuprofen (Motrin, Advil, Vicoprofen), Naproxen (Naprosyn, Aleve), Piroxcam (Feldene), Meloxicam (Mobic), Oxaprozin (Daypro), Diclofenac (Voltaren), Indomethacin (Indocin), Etodolac (Lodine), Nabumetone (Relafen), Celebrex (Celecoxib)           HERBAL SUPPLEMENTS  5 days preferred  Fish oil, krill oil, Omega-3, Vascepa, Vitamin E, Turmeric, Garlic                       Insurance Authorization:   Most insurances are now requiring a preauthorization for all procedures.  In the event that your insurance does not authorize your procedure within 48 hours of the scheduled date, your procedure will be cancelled and rescheduled to a later date.  Please contact your insurance carrier to determine what your financial responsibility will be for the procedure(s).      Cancellation/Rescheduling Appointment:   In the event you need to cancel or reschedule your appointment, you must notify the office 24 hours prior.    Post-procedure instructions:        Please schedule a follow up visit within 2 to 4 weeks after your last procedure date   Please call our office with any questions or concerns before or after your procedure at  [phone].  If you are a diabetic, please increase the frequency of your glucose monitoring after the procedure as this may cause a temporary increase in your blood sugar.  Contact your primary care  physician if your blood sugar rises as you may require some medication adjustment.  It is normal to have increased pain at injection site for up to 3-5 days after procedure, you can use heat or ice (20 minutes on 20 minutes off) for comfort.    **To hear a recorded version of these instructions, please call [phone] and follow the prompts.  **Para escuchar las instrucciones en Español, por favor de llamar el kris [phone] opción 4.       Detail Level: Generalized Price (Do Not Change): 0.00 Instructions: This plan will send the code FBSE to the PM system.  DO NOT or CHANGE the price.

## 2024-05-07 ENCOUNTER — OFFICE VISIT (OUTPATIENT)
Dept: PHYSICAL THERAPY | Age: 64
End: 2024-05-07
Attending: FAMILY MEDICINE
Payer: MEDICARE

## 2024-05-07 PROCEDURE — 97530 THERAPEUTIC ACTIVITIES: CPT

## 2024-05-07 PROCEDURE — 97110 THERAPEUTIC EXERCISES: CPT

## 2024-05-07 NOTE — PROGRESS NOTES
Insurance (Authorized # of Visits):  [facility] Medicare         Authorizing Physician: Dr. Huber  Next MD visit: none scheduled    Fall Risk: standard         Precautions: n/a             Diagnosis:   Acute right-sided low back pain with right-sided sciatica (M54.41)        Referring Provider: Curtis Huber   Date of Evaluation:    5/2/2024  Precautions:   Cancer  Next MD visit:   none scheduled  Date of Surgery: n/a    Subjective: Did home program, about the same.   Brinda Max is a 64 year old female who presents to therapy today with complaints of low back pain and down front of right leg toes and right foot swelling.     Constant pain.     Pt describes pain level at worst 9/10.   Current functional limitations include walking, standing, sitting, housework. Sleeping (prefers stomach or side)     Better: flexion in sitting     Describes obstruction     Brinda describes prior level of function no issue prior to 7 weeks ago, now uses a cane or walker due to pain. Pt goals include reduce pain.      Objective:   (Pain with *)  Tested 5/7/2024:    Neuro Screen:  Slump: intact bilaterally  Femoral nerve test: R min*, L min/nil     Lumbar AROM: (* denotes performed with pain)  Flexion: min  Extension: min  Sidebending: R min; L min (pain with shift correction either direction, min loss)*  On wall 16 inches, one airex pad (about 3 fingers bilaterally)     Strength: (* denotes performed with pain)  LE     Hip abduction: R 3-/5; L 3-/5    PF (S1): R 2+/5*; L 5/5        Gait: pt ambulates on level ground with antalgia.    Thoracic extension: mod*    Sacral distraction*    Intact:  Thoracic, sacral tests, hip ROM tests: all intact (hip flexion, hip external rotation, VONNIE, FADIR, hip extension, prone hip external and internal rotation neutral and hip extension bias) (Compression and sacral thrust), thoracic rotation and flexion      Tested 5/2/2024  Observation/Posture: worse  Neuro Screen: sensation, 0% great toe sensation on R  side.    Today’s Treatment and Response:   Flexion in lying x10: worse  Extension in standing 10x2 (progressed to fulcrum L5): no effect  Flexion/rotation Knees to the R 3 minutes: worse  -to the L 3 minutes: worse  R side glide (hips to the L) x10 doorway and shift correction x10: worse   Prone press up x10 x2: 2nd set overpressure: worse     Prone press ups hips to the L x10: better      Assessment: Given trial slouch overcorrection. No reductive exercise found    Goals: (to be met in 10 visits)   Pt will improve MATHEW score from 54/100 to 27/100 to display improved ability to walking, standing, sitting, housework  Pt will reduce pain at its worst from 9/10 to 6/10 to allow for improved ability to walking, standing, sitting, housework  Pt will plantar flexion strength from 2+/5 with pain to 3+/5 without pain allow for improved ability to walking, standing, sitting, housework  Pt will be independent with home program to allow for maintenance of goals achieved in therapy.    Plan:    Never done:  See prone press up hips to the R  L side glide (hips to the R)     Confirm no worse slouch overcorrection (maybe progress from here)    Progress strength.    Future:  see if slight flexion/rotation stretching either direction gives her some relief    Not tested:  Sacral compression     Charges: 1 there act, 2 there ex     Total Timed Treatment: 38 min  Total Treatment Time: 38 min  Date: 5/7/2024  Tx#: 2 Date:   Tx#: 3 Date:  Tx#: 4 Date:  Tx#: 5 Date:  Tx#: 6   Ther-Ex 23 minutes:    Prone press up hips to the L x10: more pain, possibility more ROM    Thoracic R rotation: x20x2: more pain, possibility more ROM    Thoracic L rotation x20x2: more pain, possibility more ROM    Flexion in step standing L leg on step x10: more pain, no change ROM    Flexioni n step standing R foot on step x10: no effect    Supine and side lying clams x10: patient reports no worse after    Educated on home program, see below.  Verbal, visual and  tactile cues for there ex.                      There act 15 minutes:  Discussed progress and plan of care as well as pathology    Dry needling 2 needles R multifidus

## 2024-05-09 ENCOUNTER — OFFICE VISIT (OUTPATIENT)
Dept: PHYSICAL THERAPY | Age: 64
End: 2024-05-09
Attending: FAMILY MEDICINE
Payer: MEDICARE

## 2024-05-09 PROCEDURE — 97110 THERAPEUTIC EXERCISES: CPT

## 2024-05-09 PROCEDURE — 97530 THERAPEUTIC ACTIVITIES: CPT

## 2024-05-09 NOTE — PROGRESS NOTES
Insurance (Authorized # of Visits):  [facility] Medicare         Authorizing Physician: Dr. Huber  Next MD visit: none scheduled    Fall Risk: standard         Precautions: n/a             Diagnosis:   Acute right-sided low back pain with right-sided sciatica (M54.41)        Referring Provider: Curtis Huber   Date of Evaluation:    5/2/2024  Precautions:   Cancer  Next MD visit:   none scheduled  Date of Surgery: n/a    Subjective: Did home program. Reports was able to sleep in her own bed for 5 hours, improved.   Brinda Max is a 64 year old female who presents to therapy today with complaints of low back pain and down front of right leg toes and right foot swelling.     Constant pain.     Pt describes pain level at worst 7/10.   Current functional limitations include walking, standing, sitting, housework. Sleeping (prefers stomach or side)     Better: flexion in sitting     Describes obstruction     Brinda describes prior level of function no issue prior to 7 weeks ago, now uses a cane or walker due to pain. Pt goals include reduce pain.      Objective:   (Pain with *)  Tested 5/9/2024:    Neuro Screen:  Femoral nerve test: intact bilaterally     Lumbar AROM: (* denotes performed with pain)  Flexion: min  Extension: min  Sidebending: R min; L min (pain with shift correction either direction, min loss)* (tested 5/7/2024)  In doorway 16 inches, 3 fingers L side glide, 5 fingers R side glide     Strength: (* denotes performed with pain)  LE     Hip abduction: R 3-/5; L 3-/5 (tested 5/7/2024)    PF (S1): R 2+/5*; L 3-/5        Gait: pt ambulates on level ground with antalgia.    Thoracic extension: intact    Tested 5/7/2024:  Sacral distraction*    Tested 5/2/2024  Observation/Posture: worse  Neuro Screen: sensation, 0% great toe sensation on R side.    Assessment: Reports was able to sleep in her own bed for 5 hours, improved. Home program progressed for strength    Goals: (to be met in 10 visits)   Pt will improve MATHEW  score from 54/100 to 27/100 to display improved ability to walking, standing, sitting, housework  Pt will reduce pain at its worst from 9/10 to 6/10 to allow for improved ability to walking, standing, sitting, housework  Pt will plantar flexion strength from 2+/5 with pain to 3+/5 without pain allow for improved ability to walking, standing, sitting, housework  Pt will be independent with home program to allow for maintenance of goals achieved in therapy.    Plan:    Never done:  See   L side glide (hips to the R)     Confirm no worse slouch overcorrection (maybe progress from here)    Progress strength.    Future:  see if slight flexion/rotation stretching either direction gives her some relief    Not tested:  Sacral compression     Charges: 1 there act, 2 there ex     Total Timed Treatment: 38 min  Total Treatment Time: 38 min  Date: 5/7/2024  Tx#: 2 Date: 5/9/2024  Tx#: 3 Date:  Tx#: 4 Date:  Tx#: 5 Date:  Tx#: 6   Ther-Ex 23 minutes:    Prone press up hips to the L x10: more pain, possibility more ROM    Thoracic R rotation: x20x2: more pain, possibility more ROM    Thoracic L rotation x20x2: more pain, possibility more ROM    Flexion in step standing L leg on step x10: more pain, no change ROM    Flexioni n step standing R foot on step x10: no effect    Supine and side lying clams x10: patient reports no worse after    Educated on home program, see below.  Verbal, visual and tactile cues for there ex.     There ex 23 minutes:  prone press up hips to the R x10 worse    Slouch overcorrection x10x2    Clams 15x2    Bridge 10x3    Step forward in doorway 5-10x    Plantar flexion double leg 20x2    Educated on home program, see below.  Verbal, visual and tactile cues for there ex.                           There act 15 minutes:  Discussed progress and plan of care as well as pathology    Dry needling 2 needles R multifidus   There act 15 minutes:  Discussed progress and plan of care as well as pathology    Dry  needling 6 needles R multifidus

## 2024-05-14 ENCOUNTER — OFFICE VISIT (OUTPATIENT)
Dept: PHYSICAL THERAPY | Age: 64
End: 2024-05-14
Attending: FAMILY MEDICINE
Payer: MEDICARE

## 2024-05-14 PROCEDURE — 97530 THERAPEUTIC ACTIVITIES: CPT

## 2024-05-14 PROCEDURE — 97110 THERAPEUTIC EXERCISES: CPT

## 2024-05-14 NOTE — PROGRESS NOTES
Insurance (Authorized # of Visits):  [facility] Medicare         Authorizing Physician: Dr. Huber  Next MD visit: none scheduled    Fall Risk: standard         Precautions: n/a             Diagnosis:   Acute right-sided low back pain with right-sided sciatica (M54.41)        Referring Provider: Curtis Huber   Date of Evaluation:    5/2/2024  Precautions:   Cancer  Next MD visit:   none scheduled  Date of Surgery: n/a    Subjective: Did some of home program. Worse for no apparent reason.  Brinda Max is a 64 year old female who presents to therapy today with complaints of low back pain and down front of right leg toes and right foot swelling.     Constant pain.     Pt describes pain level at worst 7/10.   Current functional limitations include walking, standing, sitting, housework. Sleeping (prefers stomach or side)     Better: flexion in sitting     Describes obstruction     Brinda describes prior level of function no issue prior to 7 weeks ago, now uses a cane or walker due to pain. Pt goals include reduce pain.      Objective:   (Pain with *)  Tested 5/14/2024:     Lumbar AROM: (* denotes performed with pain)  Sidebending: R min; L min (pain with shift correction either direction, min loss)* (tested 5/7/2024)  In doorway 16 inches, 3 fingers L side glide, 5 fingers R side glide       Gait: pt ambulates on level ground with antalgia.    Tested 5/9/2024:  Strength: (* denotes performed with pain)  LE     Hip abduction: R 3-/5; L 3-/5 (tested 5/7/2024)    PF (S1): R 2+/5*; L 3-/5        Tested 5/7/2024:  Sacral distraction*    Tested 5/2/2024  Neuro Screen: sensation, 0% great toe sensation on R side.    Assessment: Less pain in session after side glides and true form of slouch overcorrection. Home program progressed for strength.    Goals: (to be met in 10 visits)   Pt will improve MATHEW score from 54/100 to 27/100 to display improved ability to walking, standing, sitting, housework  Pt will reduce pain at its worst from  9/10 to 6/10 to allow for improved ability to walking, standing, sitting, housework  Pt will plantar flexion strength from 2+/5 with pain to 3+/5 without pain allow for improved ability to walking, standing, sitting, housework  Pt will be independent with home program to allow for maintenance of goals achieved in therapy.    Plan:  Exhaust hips to the R side glide, patient forces  Can she do standing lumbar extension    see if slight flexion/rotation stretching either direction gives her some relief    Progress strength.    Note worse:  Standing hip abduction  Sidelying hip abduction: too weak    Not tested:  Sacral compression     Charges: 1 there act, 2 there ex     Total Timed Treatment: 38 min  Total Treatment Time: 38 min  Date: 5/7/2024  Tx#: 2 Date: 5/9/2024  Tx#: 3 Date: 5/14/2024  Tx#: 4 Date:  Tx#: 5 Date:  Tx#: 6   Ther-Ex 23 minutes:    Prone press up hips to the L x10: more pain, possibility more ROM    Thoracic R rotation: x20x2: more pain, possibility more ROM    Thoracic L rotation x20x2: more pain, possibility more ROM    Flexion in step standing L leg on step x10: more pain, no change ROM    Flexioni n step standing R foot on step x10: no effect    Supine and side lying clams x10: patient reports no worse after    Educated on home program, see below.  Verbal, visual and tactile cues for there ex.     There ex 23 minutes:  prone press up hips to the R x10 worse    Slouch overcorrection x10x2    Clams 15x2    Bridge 10x3    Step forward in doorway 5-10x    Plantar flexion double leg 20x2    Educated on home program, see below.  Verbal, visual and tactile cues for there ex.           There ex 23 minutes:  L side glide (hips to the R)  x5-15x4: a bit better (symptoms and ROM)    Slouch overcorrection true form 10x2: better    Standing hip abduction x5: worse    Sidelying hip abductionx5: too weak, discontinued    Prone hip abduction x10x2: a bit better    Clams 15x    Bridge 10x    Step forward in  doorway 5x    Plantar flexion double leg 20x    Educated on home program, see below.  Verbal, visual and tactile cues for there ex.                  There act 15 minutes:  Discussed progress and plan of care as well as pathology    Dry needling 2 needles R multifidus   There act 15 minutes:  Discussed progress and plan of care as well as pathology    Dry needling 6 needles R multifidus There act 15 minutes:  Discussed progress and plan of care as well as pathology    Dry needling 1 needle R multifidus lateral and 3 needles greater trochanter

## 2024-05-16 ENCOUNTER — OFFICE VISIT (OUTPATIENT)
Dept: PHYSICAL THERAPY | Age: 64
End: 2024-05-16
Attending: FAMILY MEDICINE
Payer: MEDICARE

## 2024-05-16 PROCEDURE — 97110 THERAPEUTIC EXERCISES: CPT

## 2024-05-16 PROCEDURE — 97112 NEUROMUSCULAR REEDUCATION: CPT

## 2024-05-16 NOTE — PROGRESS NOTES
Insurance (Authorized # of Visits):  [facility] Medicare         Authorizing Physician: Dr. Huber  Next MD visit: none scheduled    Fall Risk: standard         Precautions: n/a             Diagnosis:   Acute right-sided low back pain with right-sided sciatica (M54.41)        Referring Provider: Curtis Huber   Date of Evaluation:    5/2/2024  Precautions:   Cancer  Next MD visit:   none scheduled  Date of Surgery: n/a    Subjective: Did some of home program. A bit better, walks better after side glide exercise with less pain  Brinda Max is a 64 year old female who presents to therapy today with complaints of low back pain and down front of right leg toes and right foot swelling.     Constant pain.     Pt describes pain level at worst 7/10.   Current functional limitations include walking, standing, sitting, housework. Sleeping (prefers stomach or side)     Better: flexion in sitting     Describes obstruction     Brinda describes prior level of function no issue prior to 7 weeks ago, now uses a cane or walker due to pain. Pt goals include reduce pain.      Objective:   (Pain with *)  Tested 5/16/2024:     Lumbar AROM: (* denotes performed with pain)  Sidebending: R min; L min (pain with shift correction either direction, min loss)* (tested 5/7/2024)  In doorway 16 inches, 2 fingers L side glide, 3 fingers R side glide       Gait: pt ambulates on level ground with antalgia.    Tested 5/9/2024:  Strength: (* denotes performed with pain)  LE     Hip abduction: R 3-/5; L 3-/5 (tested 5/7/2024)    PF (S1): R 2+/5*; L 3-/5        Tested 5/7/2024:  Sacral distraction*    Tested 5/2/2024  Neuro Screen: sensation, 0% great toe sensation on R side.    Assessment: Less pain in session after side glides and lumbar extension. Home program progressed for strength.    Goals: (to be met in 10 visits)   Pt will improve MATHEW score from 54/100 to 27/100 to display improved ability to walking, standing, sitting, housework  Pt will reduce pain  at its worst from 9/10 to 6/10 to allow for improved ability to walking, standing, sitting, housework  Pt will plantar flexion strength from 2+/5 with pain to 3+/5 without pain allow for improved ability to walking, standing, sitting, housework  Pt will be independent with home program to allow for maintenance of goals achieved in therapy.    Plan:  Re-assess after injection    see if slight flexion/rotation stretching either direction gives her some relief    Progress strength.  -Hamstring strength (bridge, dead lift, sit and foot into chair)    Note worse:  Standing hip abduction  Sidelying hip abduction: too weak  Step forward with arm movement    Not tested:  Sacral compression     Charges: 1 neuro re-education, 2 there ex     Total Timed Treatment: 38 min  Total Treatment Time: 38 min  Date: 5/7/2024  Tx#: 2 Date: 5/9/2024  Tx#: 3 Date: 5/14/2024  Tx#: 4 Date: 5/16/2024  Tx#: 5 Date:  Tx#: 6   Ther-Ex 23 minutes:    Prone press up hips to the L x10: more pain, possibility more ROM    Thoracic R rotation: x20x2: more pain, possibility more ROM    Thoracic L rotation x20x2: more pain, possibility more ROM    Flexion in step standing L leg on step x10: more pain, no change ROM    Flexioni n step standing R foot on step x10: no effect    Supine and side lying clams x10: patient reports no worse after    Educated on home program, see below.  Verbal, visual and tactile cues for there ex.     There ex 23 minutes:  prone press up hips to the R x10 worse    Slouch overcorrection x10x2    Clams 15x2    Bridge 10x3    Step forward in doorway 5-10x    Plantar flexion double leg 20x2    Educated on home program, see below.  Verbal, visual and tactile cues for there ex.           There ex 23 minutes:  L side glide (hips to the R)  x5-15x4: a bit better (symptoms and ROM)    Slouch overcorrection true form 10x2: better    Standing hip abduction x5: worse    Sidelying hip abductionx5: too weak, discontinued    Prone hip  abduction x10x2: a bit better    Clams 15x    Bridge 10x    Step forward in doorway 5x    Plantar flexion double leg 20x    Educated on home program, see below.  Verbal, visual and tactile cues for there ex.   There ex 30 minutes:  L side glide (hips to the R)  x15x2: a bit better (symptoms and ROM). Shift correction x10: maybe a bit worse.    Extension in standing 10x5: progressed to fulcrum, L1 better than L5: no effect later in session    Slouch overcorrection true form 10x: no effect (did not do sustained)    Prone hip abduction x12x2: a bit better    Clams 20x2    Straight leg raise x10x2    Bridge 10x2    Step forward in doorway 5x and backwards x5 (forward done with arm x10 and this is worse), others are no effect    Side step with arm in doorway x15: no effect    Plantar flexion double leg 20x2    Educated on home program, see below.  Verbal, visual and tactile cues for there ex.             Neuro re-education: 8 minutes:  Re-assessed progresses, discussed plan of care.    Taught form for more advanced exercises.    There act 15 minutes:  Discussed progress and plan of care as well as pathology    Dry needling 2 needles R multifidus   There act 15 minutes:  Discussed progress and plan of care as well as pathology    Dry needling 6 needles R multifidus There act 15 minutes:  Discussed progress and plan of care as well as pathology    Dry needling 1 needle R multifidus lateral and 3 needles greater trochanter

## 2024-05-20 ENCOUNTER — APPOINTMENT (OUTPATIENT)
Dept: GENERAL RADIOLOGY | Facility: HOSPITAL | Age: 64
End: 2024-05-20
Attending: ANESTHESIOLOGY

## 2024-05-20 ENCOUNTER — HOSPITAL ENCOUNTER (OUTPATIENT)
Facility: HOSPITAL | Age: 64
Setting detail: HOSPITAL OUTPATIENT SURGERY
Discharge: HOME | End: 2024-05-20
Attending: ANESTHESIOLOGY | Admitting: ANESTHESIOLOGY

## 2024-05-20 VITALS
RESPIRATION RATE: 18 BRPM | DIASTOLIC BLOOD PRESSURE: 64 MMHG | SYSTOLIC BLOOD PRESSURE: 114 MMHG | HEART RATE: 71 BPM | OXYGEN SATURATION: 100 % | TEMPERATURE: 98 F

## 2024-05-20 LAB — GLUCOSE BLD-MCNC: 133 MG/DL (ref 70–99)

## 2024-05-20 PROCEDURE — 3E0U33Z INTRODUCTION OF ANTI-INFLAMMATORY INTO JOINTS, PERCUTANEOUS APPROACH: ICD-10-PCS | Performed by: ANESTHESIOLOGY

## 2024-05-20 PROCEDURE — 64483 NJX AA&/STRD TFRM EPI L/S 1: CPT | Performed by: ANESTHESIOLOGY

## 2024-05-20 RX ORDER — DEXAMETHASONE SODIUM PHOSPHATE 10 MG/ML
INJECTION, SOLUTION INTRAMUSCULAR; INTRAVENOUS
Status: DISCONTINUED | OUTPATIENT
Start: 2024-05-20 | End: 2024-05-20

## 2024-05-20 RX ORDER — SODIUM CHLORIDE 9 MG/ML
INJECTION, SOLUTION INTRAMUSCULAR; INTRAVENOUS; SUBCUTANEOUS
Status: DISCONTINUED | OUTPATIENT
Start: 2024-05-20 | End: 2024-05-20

## 2024-05-20 RX ORDER — LIDOCAINE HYDROCHLORIDE 10 MG/ML
INJECTION, SOLUTION EPIDURAL; INFILTRATION; INTRACAUDAL; PERINEURAL
Status: DISCONTINUED | OUTPATIENT
Start: 2024-05-20 | End: 2024-05-20

## 2024-05-20 NOTE — OPERATIVE REPORT
[facility]  Operative Report  2024     Brinda Max Patient Status:  Hospital Outpatient Surgery    1960 MRN [id number]   Location [facility] Attending Wicho Chew MD   Hosp Day # 0 PCP Curtis Huber DO     Indication: Brinda is a 64 year old female lumbar radiculitis. MRI reviewed consistent with radiculopathy.    Preoperative Diagnosis:  Lumbar radiculitis [M54.16]    Postoperative Diagnosis: Same as above.    Procedure performed: Right L5 TRANSFORAMINAL LUMBAR EPIDURAL STEROID INJECTION SINGLE LEVEL with local    Anesthesia: Local .    EBL: Less than 1 ml.    Procedure Description:   After reviewing the patient's history and performing a focused physical examination, the diagnosis was confirmed and contraindications such as infection and coagulopathy were ruled out.  Following review of potential side effects and complications, including but not necessarily limited to infection, allergic reaction, local tissue breakdown, nerve injury, and paresis, the patient indicated they understood and agreed to proceed. After obtaining the informed consent, the patient was brought to the procedure room and monitored.     In the prone position, following sterile prep and drape of the lumbar region, the L5 neural foramen was identified under fluoroscopy.  The skin and subcutaneous tissue was anesthetized via 25-gauge 1.5\" needle with approximately 2 cc of 1% lidocaine.  A 22-gauge 5\" Quincke spinal needle was introduced toward the inferior aspect of the junction between the transverse process and pedicle of the L5 level atraumatically under fluoroscopic guidance. The needle was advanced into the anterior epidural space at this level. The needle position was confirmed under AP and lateral fluoroscopic view.  Following negative aspiration for CSF and blood, approximately 1 cc of Omnipaque 240 was injected.  An excellent contrast spread along the epidural space and the nerve  root was obtained.  At this point, 2 cc of normal saline with 10 mg of dexamethasone was injected without complication.  The needle was withdrawn with stylet in situ. The patient tolerated procedure very well.  The patient was observed until discharge criteria met.  Discharge instructions were given and patient was released to a responsible adult.       Complications: None.    Follow up: The patient was followed in the pain clinic as needed basis.      Wicho Chew MD

## 2024-05-20 NOTE — H&P
History & Physical Examination    Patient Name: Brinda Max  MRN: [id number]  [facility]: [id number]  YOB: 1960    Pre-Operative Diagnosis:  Lumbar radiculitis [M54.16]    Present Illness: Lumbar radiculitis    Medications Prior to Admission   Medication Sig Dispense Refill Last Dose    traMADol 50 MG Oral Tab Take 1 tablet (50 mg total) by mouth every 6 (six) hours as needed for Pain. 30 tablet 0     losartan 50 MG Oral Tab Take 1 tablet (50 mg total) by mouth daily. 100 tablet 2     cycloSPORINE non-modified 25 MG Oral Cap TAKE 3 CAPSULES BY MOUTH EVERY  MORNING AND 2 CAPSULES EVERY  EVENING 450 capsule 3     predniSONE 5 MG Oral Tab Take 1 tablet (5 mg total) by mouth daily. 90 tablet 1     ASPIRIN LOW DOSE 81 MG Oral Tab EC Take 1 tablet (81 mg total) by mouth daily.   5/18/2024 at 2100    Mycophenolate Mofetil 500 MG Oral Tab Take 2 tablets (1,000 mg total) by mouth 2 (two) times daily. 360 tablet 3     atorvastatin 20 MG Oral Tab Take 1 tablet (20 mg total) by mouth daily. 90 tablet 3     Glucose Blood In Vitro Strip 1 each by Other route daily. 90 each 3     magnesium oxide 400 MG Oral Tab Take 1 tablet (400 mg total) by mouth 2 (two) times daily.       multivitamin Oral Tab Take 1 tablet by mouth daily.       Cinnamon 500 MG Oral Cap Take 1,000 mg by mouth 2 (two) times daily.       Omega 3 1000 MG Oral Cap Take 2,000 mg by mouth 2 (two) times daily.     5/14/2024    Cholecalciferol (VITAMIN D) 1000 UNITS Oral Cap Take 1 capsule by mouth 2 (two) times daily.        No current facility-administered medications for this encounter.       Allergies:   Allergies   Allergen Reactions    Adhesive Tape OTHER (SEE COMMENTS)     Caused redness after being on for a length of time    Ace Inhibitors Coughing       Past Medical History:    Arthritis    Knees bone on bone    Back problem    Cancer (HCC)    Easy bruising    ASA 81 mg daily    Glomerulonephritis    Hemorrhoids    Pregnancy    History of  COVID-19    fatigue. not hospitalized. no continued symptoms    Kidney failure    Transplant 4/25/02    Osteoarthritis    Other and unspecified hyperlipidemia    Pain in joints    Type II or unspecified type diabetes mellitus without mention of complication, not stated as uncontrolled    Unspecified essential hypertension    Visual impairment    glasses / contacts    Wears glasses     Past Surgical History:   Procedure Laterality Date    Colonoscopy  2013, 2017    Organ transplant  2002    Kidney    Other surgical history  age 42    Renal transplant    Tonsillectomy  1964    Transplantation of kidney      4/25/02 at [place]     Family History   Problem Relation Age of Onset    Other (Other) Mother         alzheimers    Diabetes Mother         Type 1    Other Mother         Parkinsons    Other (Other) Self 54        basal skin cancer     Social History     Tobacco Use    Smoking status: Never    Smokeless tobacco: Never   Substance Use Topics    Alcohol use: No       SYSTEM Check if Review is Normal Check if Physical Exam is Normal If not normal, please explain:   HEENT [x ] [x ]    NECK & BACK [x ] [x ]    HEART [x ] [x ]    LUNGS [x ] [x ]    ABDOMEN [x ] [x ]    UROGENITAL [x ] [x ]    EXTREMITIES [x ] [x ]    OTHER        [ x ] I have discussed the risks and benefits and alternatives with the patient/family.  They understand and agree to proceed with plan of care.  [ x ] I have reviewed the History and Physical done within the last 30 days.  Any changes noted above.    Wicho Chew MD

## 2024-05-20 NOTE — DISCHARGE INSTRUCTIONS
Home Care Instructions Following Your Pain Procedure     Brinda,  It has been a pleasure to have you as our patient. To help you at home, you must follow these general discharge instructions. We will review these with you before you are discharged. It is our hope that you have a complete and uneventful recovery from our procedure.     General Instructions:  What to Expect:  Bandages from your procedure today can be removed when you get home.  Please avoid soaking and/or swimming for 24 hours.  Showering is okay  It is normal to have increased pain symptoms and/or pain at injection site for up to 3-5 days after procedure, you can use heat or ice (20 minutes on 20 minutes off) for comfort.  You may experience some temporary side effects which may include restlessness or insomnia, flushing of the face, or heart palpitations.  Please contact the provider if these symptoms do not resolve within 3-4 days.  Lightheadedness or nausea may occur and should resolve within 24 to 48 hours.  If you develop a headache after treatment, rest, drink fluids (with caffeine, if possible) and take mild over-the-counter pain medication.  If the headache does not improve with the above treatment, contact the physician.  Home Medications:  Resume all previously prescribed medication.  Please avoid taking NSAIDs (Non-Steriodal Anti-Inflammatory Drugs) such as:  Ibuprofen ( Advil, Motrin) Aleve (Naproxen), Diclofenac, Meloxicam for 6 hours after procedure.   If you are on Coumadin (Warfarin) or any other anti-coagulant (or \"blood thinning\") medication such as Plavix (Clopidogrel), Xarelto (Rivaroxaban), Eliquis (Apixaban), Effient (Prasugrel) etc., restart on the following day from the procedure unless otherwise directed by your provider.  If you are a diabetic, please increase the frequency of your glucose monitoring after the procedure as steroids may cause a temporary (2-3 day) increase in your blood sugar.  Contact your primary care  physician if your blood sugar remains elevated as you may require some medication adjustment.  Diet:  Resume your regular diet as tolerated.  Activity:  We recommend that you relax and rest during the rest of your procedure day.  If you feel weakness in your arms or legs do not drive.  Follow-up Appointment  Please schedule a follow-up visit within 3 to 4 weeks after your last procedure date.  Question or Concerns:  Feel free to call our office with any questions or concerns at [phone] (option #2)    Brinda  Thank you for coming to [facility] for your procedure.  The nurses try very hard to make sure you receive the best care possible.  Your trust in them as well as us is greatly appreciated.    Thanks so much,   Dr. Wicho Chew

## 2024-05-21 ENCOUNTER — TELEPHONE (OUTPATIENT)
Dept: PAIN CLINIC | Facility: CLINIC | Age: 64
End: 2024-05-21

## 2024-05-21 ENCOUNTER — OFFICE VISIT (OUTPATIENT)
Dept: PHYSICAL THERAPY | Age: 64
End: 2024-05-21
Attending: FAMILY MEDICINE
Payer: MEDICARE

## 2024-05-21 PROCEDURE — 97112 NEUROMUSCULAR REEDUCATION: CPT

## 2024-05-21 PROCEDURE — 97110 THERAPEUTIC EXERCISES: CPT

## 2024-05-21 NOTE — TELEPHONE ENCOUNTER
Courtesy called placed to patient for post procedure follow up. Patient stated she is feeling fantastic . Patient has no further questions at this time.    Pt verbalized understanding to call with any questions or concerns.      Procedure: Right L5 TLESI  Date: 05/20/2024  Follow up Visit Scheduled: 05/31/24 @ 01:00 PM

## 2024-05-21 NOTE — PROGRESS NOTES
Insurance (Authorized # of Visits):  [facility] Medicare         Authorizing Physician: Dr. Huber  Next MD visit: none scheduled    Fall Risk: standard         Precautions: n/a             Diagnosis:   Acute right-sided low back pain with right-sided sciatica (M54.41)        Referring Provider: Curtis Huber   Date of Evaluation:    5/2/2024  Precautions:   Cancer  Next MD visit:   none scheduled  Date of Surgery: n/a    Subjective: Did some of home program. Much better, injection yesterday, just a little foot pain.   Brinda Max is a 64 year old female who presents to therapy today with complaints of low back pain and down front of right leg toes and right foot swelling.     Constant pain.     Pt describes pain level at worst 2/10.   Current functional limitations include walking, standing, sitting, housework. Sleeping (prefers stomach or side)     Better: flexion in sitting     Describes obstruction     Brinda describes prior level of function no issue prior to 7 weeks ago, now uses a cane or walker due to pain. Pt goals include reduce pain.      Objective:   (Pain with *)  Tested 5/21/2024:  Gait: pt ambulates on level ground with antalgia.    Tested 5/16/2024:     Lumbar AROM: (* denotes performed with pain)  Sidebending: R min; L min (pain with shift correction either direction, min loss)* (tested 5/7/2024)  In doorway 16 inches, 2 fingers L side glide, 3 fingers R side glide    Tested 5/9/2024:  Strength: (* denotes performed with pain)  LE     Hip abduction: R 3-/5; L 3-/5 (tested 5/7/2024)    PF (S1): R 2+/5*; L 3-/5        Tested 5/7/2024:  Sacral distraction*    Tested 5/2/2024  Neuro Screen: sensation, 0% great toe sensation on R side.    Assessment: Reports improvement after injection yesterday. Home program progressed by adding dead lift.    Goals: (to be met in 10 visits)   Pt will improve MATHEW score from 54/100 to 27/100 to display improved ability to walking, standing, sitting, housework  Pt will reduce pain  at its worst from 9/10 to 6/10 to allow for improved ability to walking, standing, sitting, housework  Pt will plantar flexion strength from 2+/5 with pain to 3+/5 without pain allow for improved ability to walking, standing, sitting, housework  Pt will be independent with home program to allow for maintenance of goals achieved in therapy.    Plan:  Progress note next visit    Reduce limp with gait is a goal    see if slight flexion/rotation stretching either direction gives her some relief    Progress strength.  -Hamstring strength (bridge, sit and foot into chair)    Note worse:  Standing hip abduction  Sidelying hip abduction: too weak  Step forward with arm movement    Not tested:  Sacral compression     Charges: 1 neuro re-education, 2 there ex     Total Timed Treatment: 38 min  Total Treatment Time: 38 min  Date: 5/7/2024  Tx#: 2 Date: 5/9/2024  Tx#: 3 Date: 5/14/2024  Tx#: 4 Date: 5/16/2024  Tx#: 5 Date: 5/21/2024  Tx#: 6     Ther-Ex 23 minutes:    Prone press up hips to the L x10: more pain, possibility more ROM    Thoracic R rotation: x20x2: more pain, possibility more ROM    Thoracic L rotation x20x2: more pain, possibility more ROM    Flexion in step standing L leg on step x10: more pain, no change ROM    Flexioni n step standing R foot on step x10: no effect    Supine and side lying clams x10: patient reports no worse after    Educated on home program, see below.  Verbal, visual and tactile cues for there ex.     There ex 23 minutes:  prone press up hips to the R x10 worse    Slouch overcorrection x10x2    Clams 15x2    Bridge 10x3    Step forward in doorway 5-10x    Plantar flexion double leg 20x2    Educated on home program, see below.  Verbal, visual and tactile cues for there ex.           There ex 23 minutes:  L side glide (hips to the R)  x5-15x4: a bit better (symptoms and ROM)    Slouch overcorrection true form 10x2: better    Standing hip abduction x5: worse    Sidelying hip abductionx5: too  weak, discontinued    Prone hip abduction x10x2: a bit better    Clams 15x    Bridge 10x    Step forward in doorway 5x    Plantar flexion double leg 20x    Educated on home program, see below.  Verbal, visual and tactile cues for there ex.   There ex 30 minutes:  L side glide (hips to the R)  x15x2: a bit better (symptoms and ROM). Shift correction x10: maybe a bit worse.    Extension in standing 10x5: progressed to fulcrum, L1 better than L5: no effect later in session    Slouch overcorrection true form 10x: no effect (did not do sustained)    Prone hip abduction x12x2: a bit better    Clams 20x2    Straight leg raise x10x2    Bridge 10x2    Step forward in doorway 5x and backwards x5 (forward done with arm x10 and this is worse), others are no effect    Side step with arm in doorway x15: no effect    Plantar flexion double leg 20x2    Educated on home program, see below.  Verbal, visual and tactile cues for there ex.   There ex 30 minutes:    dead lift with purse x10x3    Bridge x20x2    Straight leg raise x20x2     Clam x25x2    Plantar flexion double leg 20xx    Prone hip abduction x12x2: a bit better    Toe flexion standing x10: no effect    Gait with cues visual, verbal 4 minutes    Educated on home program, see below.  Verbal, visual and tactile cues for there ex.              Neuro re-education: 8 minutes:  Re-assessed progresses, discussed plan of care.    Taught form for more advanced exercises. Neuro re-education: 8 minutes:  Re-assessed progresses, discussed plan of care.    Taught form for more advanced exercises.    Slouch overcorrection without slumping 10x: no effect (did not do sustained) x4 sets    Practiced pillow move to chair and back     There act 15 minutes:  Discussed progress and plan of care as well as pathology    Dry needling 2 needles R multifidus   There act 15 minutes:  Discussed progress and plan of care as well as pathology    Dry needling 6 needles R multifidus There act 15  minutes:  Discussed progress and plan of care as well as pathology    Dry needling 1 needle R multifidus lateral and 3 needles greater trochanter

## 2024-05-23 ENCOUNTER — APPOINTMENT (OUTPATIENT)
Dept: PHYSICAL THERAPY | Age: 64
End: 2024-05-23
Attending: FAMILY MEDICINE
Payer: MEDICARE

## 2024-05-24 ENCOUNTER — OFFICE VISIT (OUTPATIENT)
Dept: SURGERY | Facility: CLINIC | Age: 64
End: 2024-05-24

## 2024-05-24 VITALS
SYSTOLIC BLOOD PRESSURE: 118 MMHG | BODY MASS INDEX: 34.55 KG/M2 | WEIGHT: 176 LBS | DIASTOLIC BLOOD PRESSURE: 76 MMHG | HEART RATE: 82 BPM | HEIGHT: 60 IN

## 2024-05-24 DIAGNOSIS — Z98.1 S/P LUMBAR FUSION: Primary | ICD-10-CM

## 2024-05-24 PROCEDURE — 3078F DIAST BP <80 MM HG: CPT | Performed by: NEUROLOGICAL SURGERY

## 2024-05-24 PROCEDURE — 1160F RVW MEDS BY RX/DR IN RCRD: CPT | Performed by: NEUROLOGICAL SURGERY

## 2024-05-24 PROCEDURE — 99213 OFFICE O/P EST LOW 20 MIN: CPT | Performed by: NEUROLOGICAL SURGERY

## 2024-05-24 PROCEDURE — 3008F BODY MASS INDEX DOCD: CPT | Performed by: NEUROLOGICAL SURGERY

## 2024-05-24 PROCEDURE — 3074F SYST BP LT 130 MM HG: CPT | Performed by: NEUROLOGICAL SURGERY

## 2024-05-24 PROCEDURE — 1159F MED LIST DOCD IN RCRD: CPT | Performed by: NEUROLOGICAL SURGERY

## 2024-05-24 RX ORDER — DIAZEPAM 10 MG/1
5 TABLET ORAL NIGHTLY PRN
Qty: 30 TABLET | Refills: 0 | Status: SHIPPED | OUTPATIENT
Start: 2024-05-24

## 2024-05-24 NOTE — PATIENT INSTRUCTIONS
Refill policies:    Allow 2-3 business days for refills; controlled substances may take longer.  Contact your pharmacy at least 5 days prior to running out of medication and have them send an electronic request or submit request through the “request refill” option in your [employer] account.  Refills are not addressed on weekends; covering physicians do not authorize routine medications on weekends.  No narcotics or controlled substances are refilled after noon on Fridays or by on call physicians.  By law, narcotics must be electronically prescribed.  A 30 day supply with no refills is the maximum allowed.  If your prescription is due for a refill, you may be due for a follow up appointment.  To best provide you care, patients receiving routine medications need to be seen at least once a year.  Patients receiving narcotic/controlled substance medications need to be seen at least once every 3 months.  In the event that your preferred pharmacy does not have the requested medication in stock (e.g. Backordered), it is your responsibility to find another pharmacy that has the requested medication available.  We will gladly send a new prescription to that pharmacy at your request.    Scheduling Tests:    If your physician has ordered radiology tests such as MRI or CT scans, please contact Central Scheduling at [phone] right away to schedule the test.  Once scheduled, the [facility] Centralized Referral Team will work with your insurance carrier to obtain pre-certification or prior authorization.  Depending on your insurance carrier, approval may take 3-10 days.  It is highly recommended patients assure they have received an authorization before having a test performed.  If test is done without insurance authorization, patient may be responsible for the entire amount billed.      Precertification and Prior Authorizations:  If your physician has recommended that you have a procedure or additional testing performed the [facility]  Centralized Referral Team will contact your insurance carrier to obtain pre-certification or prior authorization.    You are strongly encouraged to contact your insurance carrier to verify that your procedure/test has been approved and is a COVERED benefit.  Although the [facility] Centralized Referral Team does its due diligence, the insurance carrier gives the disclaimer that \"Although the procedure is authorized, this does not guarantee payment.\"    Ultimately the patient is responsible for payment.   Thank you for your understanding in this matter.  Paperwork Completion:  If you require FMLA or disability paperwork for your recovery, please make sure to either drop it off or have it faxed to our office at [phone]. Be sure the form has your name and date of birth on it.  The form will be faxed to our Forms Department and they will complete it for you.  There is a 25$ fee for all forms that need to be filled out.  Please be aware there is a 10-14 day turnaround time.  You will need to sign a release of information (CHANDRA) form if your paperwork does not come with one.  You may call the Forms Department with any questions at [phone].  Their fax number is [phone].

## 2024-05-24 NOTE — PROGRESS NOTES
Established patient:  Reason for follow up:   F/u after injection    Numeric Rating Scale:        Pain at Present:  1/10       Most recent treatments for Current Pain Condition:   05/20/24 R L5-S1 TFESI- relief for 2 day

## 2024-05-28 ENCOUNTER — OFFICE VISIT (OUTPATIENT)
Dept: PHYSICAL THERAPY | Age: 64
End: 2024-05-28
Attending: FAMILY MEDICINE
Payer: MEDICARE

## 2024-05-28 ENCOUNTER — TELEPHONE (OUTPATIENT)
Dept: SURGERY | Facility: CLINIC | Age: 64
End: 2024-05-28

## 2024-05-28 DIAGNOSIS — E78.2 MIXED HYPERLIPIDEMIA: ICD-10-CM

## 2024-05-28 DIAGNOSIS — M47.816 LUMBAR SPONDYLOSIS: ICD-10-CM

## 2024-05-28 DIAGNOSIS — Z98.1 S/P LUMBAR FUSION: ICD-10-CM

## 2024-05-28 DIAGNOSIS — M54.16 LUMBAR BACK PAIN WITH RADICULOPATHY AFFECTING RIGHT LOWER EXTREMITY: ICD-10-CM

## 2024-05-28 DIAGNOSIS — M47.26 OSTEOARTHRITIS OF SPINE WITH RADICULOPATHY, LUMBAR REGION: ICD-10-CM

## 2024-05-28 PROCEDURE — 97110 THERAPEUTIC EXERCISES: CPT

## 2024-05-28 PROCEDURE — 97140 MANUAL THERAPY 1/> REGIONS: CPT

## 2024-05-28 PROCEDURE — 97112 NEUROMUSCULAR REEDUCATION: CPT

## 2024-05-28 RX ORDER — ATORVASTATIN CALCIUM 20 MG/1
20 TABLET, FILM COATED ORAL DAILY
Qty: 100 TABLET | Refills: 2 | Status: SHIPPED | OUTPATIENT
Start: 2024-05-28

## 2024-05-28 NOTE — PROGRESS NOTES
Progress Summary  Pt has attended 7 visits in Physical Therapy.      Insurance (Authorized # of Visits):  [facility] Medicare         Authorizing Physician: Dr. Huber  Next MD visit: none scheduled    Fall Risk: standard         Precautions: n/a             Diagnosis:   Acute right-sided low back pain with right-sided sciatica (M54.41)        Referring Provider: Curtis Huber   Date of Evaluation:    5/2/2024  Precautions:   Cancer  Next MD visit:   none scheduled  Date of Surgery: n/a    Subjective: Did some of home program. Worse, pain relief from injection lasted only 2 days.   Brinda Max is a 64 year old female who presents to therapy today with complaints of low back pain and down front of right leg toes and right foot swelling.     Constant pain.     Pt describes pain level at worst 6/10.   Current functional limitations include walking, standing, sitting, housework. Sleeping (prefers stomach or side)     Better: flexion in sitting     Describes obstruction     Brinda describes prior level of function no issue prior to 7 weeks ago, now uses a cane or walker due to pain. Pt goals include reduce pain.      Objective:   (Pain with *)  Tested 5/28/2024:  Gait: pt ambulates on level ground with antalgia.     Lumbar AROM: (* denotes performed with pain)  Sidebending: R min; L min (pain with shift correction either direction, min loss)* (tested 5/7/2024)  In doorway 16 inches, 2 fingers L side glide, 3 fingers R side glide    Strength: (* denotes performed with pain)  LE     Hip abduction: R 3-/5; L 3-/5 (tested 5/7/2024)    PF (S1): R 3+/5; L 3+/5        Tested 5/7/2024:  Sacral distraction*    Tested 5/2/2024  Neuro Screen: sensation, 0% great toe sensation on R side.    Assessment: Overall improved, but still only sleeping 5 hours at night and unable to walk 20 minutes due to pain. Will benefit from additional Physical Therapy to reach remaining goals.     Goals: (to be met in 17 visits)   Pt will improve MATHEW score  from 54/100 to 27/100 to display improved ability to walking, standing, sitting, housework MET  Pt will reduce pain at its worst from 9/10 to 6/10 to allow for improved ability to walking, standing, sitting, housework MET  Pt will plantar flexion strength from 2+/5 with pain to 3+/5 without pain allow for improved ability to walking, standing, sitting, housework MET  Pt will be independent with home program to allow for maintenance of goals achieved in therapy. (95% progress 5/28/2024)    New Goals on 5/28/2024:  Pt will improve MATHEW score from 54/100 to 27/100 to display improved ability to walking, standing, sitting, housework  Pt will reduce pain at its worst from 9/10 to 6/10 to allow for improved ability to walking, standing, sitting, housework       Post Oswestry Disability Index Score  Post Score: 16 % (5/28/2024 12:12 PM)    38 % improvement    Plan: Continue skilled Physical Therapy 2 x/week or a total of 17 visits over a 90 day period. Treatment will include: manual therapy, therapeutic exercise, therapeutic activites, neuromuscular reeducation and home program         Patient/Family/Caregiver was advised of these findings, precautions, and treatment options and has agreed to actively participate in planning and for this course of care.    Thank you for your referral. If you have any questions, please contact me at Dept: [phone].    Sincerely,  Electronically signed by therapist: Cricket Merritt PT     Physician's certification required:  Yes  Please co-sign or sign and return this letter via fax as soon as possible to [phone].   I certify the need for these services furnished under this plan of treatment and while under my care.    X___________________________________________________ Date____________________    Certification From: 5/28/2024  To:8/26/2024          Additional ideas:  Reduce limp with gait is a goal    Exhaust flexion/rotation knees to the R, patient forces first  -Then prone lying,  progress from here    Alternative lateral, never done since injection:  Prone press up hips to the L  R side glide (hips to the L)  Thoracic rotation either side    Dead lift more weight  Lunge  Squat  Rows with band    Note worse:  Straight leg raise, cut out not like popping in hip  Standing hip abduction  Sidelying hip abduction: too weak  Step forward with arm movement    Not tested:  Sacral compression     Charges: 1 neuro re-education, 1 there ex, 1 manual therapy    Total Timed Treatment: 38 min  Total Treatment Time: 38 min  Date: 5/14/2024  Tx#: 4 Date: 5/16/2024  Tx#: 5 Date: 5/21/2024  Tx#: 6 5/28/2024  Tx# 7    There ex 23 minutes:  L side glide (hips to the R)  x5-15x4: a bit better (symptoms and ROM)    Slouch overcorrection true form 10x2: better    Standing hip abduction x5: worse    Sidelying hip abductionx5: too weak, discontinued    Prone hip abduction x10x2: a bit better    Clams 15x    Bridge 10x    Step forward in doorway 5x    Plantar flexion double leg 20x    Educated on home program, see below.  Verbal, visual and tactile cues for there ex.   There ex 30 minutes:  L side glide (hips to the R)  x15x2: a bit better (symptoms and ROM). Shift correction x10: maybe a bit worse.    Extension in standing 10x5: progressed to fulcrum, L1 better than L5: no effect later in session    Slouch overcorrection true form 10x: no effect (did not do sustained)    Prone hip abduction x12x2: a bit better    Clams 20x2    Straight leg raise x10x2    Bridge 10x2    Step forward in doorway 5x and backwards x5 (forward done with arm x10 and this is worse), others are no effect    Side step with arm in doorway x15: no effect    Plantar flexion double leg 20x2    Educated on home program, see below.  Verbal, visual and tactile cues for there ex.   There ex 30 minutes:    dead lift with purse x10x3    Bridge x20x2    Straight leg raise x20x2     Clam x25x2    Plantar flexion double leg 20xx    Prone hip abduction  x12x2: a bit better    Toe flexion standing x10: no effect    Gait with cues visual, verbal 4 minutes    Educated on home program, see below.  Verbal, visual and tactile cues for there ex.   There ex 22 minutes:    dead lift with purse x10x    Bridge x20x    Straight leg raise x20x      Plantar flexion double leg 20x    Prone hip abduction x12x    Step forward, sideways and backwards in doorway with or without arm movement 10-20    L side glide (hips to the R) x20 and shift correction: no effect    Extension in standing with overpressure L5 x10: no effect    Flexion/rotation knees to the R 1-3 minutes x4: 2 sets therapist overpressure: a bit better    Flexion/rotation knees to the L 3 minutes: therapist overpressure: worse    Educated on home program, see below.  Verbal, visual and tactile cues for there ex.     Neuro re-education: 8 minutes:  Re-assessed progresses, discussed plan of care.    Taught form for more advanced exercises. Neuro re-education: 8 minutes:  Re-assessed progresses, discussed plan of care.    Taught form for more advanced exercises.    Slouch overcorrection without slumping 10x: no effect (did not do sustained) x4 sets    Practiced pillow move to chair and back Neuro re-education: 8 minutes:  Re-assessed progresses, discussed plan of care.        There act 15 minutes:  Discussed progress and plan of care as well as pathology    Dry needling 1 needle R multifidus lateral and 3 needles greater trochanter          Manual therapy 8 minutes:  Extension/rotation mobilization on L side of back and flexion/rotation with therapist overpressure 3 minutes

## 2024-05-29 ENCOUNTER — TELEPHONE (OUTPATIENT)
Dept: SURGERY | Facility: CLINIC | Age: 64
End: 2024-05-29

## 2024-05-29 ENCOUNTER — TELEPHONE (OUTPATIENT)
Dept: PHYSICAL THERAPY | Facility: HOSPITAL | Age: 64
End: 2024-05-29

## 2024-05-29 NOTE — TELEPHONE ENCOUNTER
Order placed for CT abdomen/pelvis to assess vasculature for planned spine surgery.    MKK    Please have patient do as soon as possible     Thanks

## 2024-05-29 NOTE — PROGRESS NOTES
Formerly [facility], [facility]–1983  Neurological Surgery Clinic Note    Brinda Max  4/18/1960  [id number]  PCP: Curtis Huber DO    REASON FOR VISIT:  Lumbar radiculopathy    HISTORY OF PRESENT ILLNESS:  Brinda Max is a(n) 64 year old female with low back pain and lumbar radiculopathy.  Patient reports falls.  She has numbness and tingling in her legs. She has no bowel/bladder habit changes.  She is ambulatory.  She has attempted PT and meds with no relief of her symptoms.       Location: LBP   Quality: numbness/tilgning and weakness in her legs  Severity: progressive  Duration: months  Timing: worse at night  Context: severe DDD with spondy at L4-S1  Modifying Factors: PT and AZ offer no relief  Associated signs/symptoms: pain      PAST MEDICAL HISTORY:  Past Medical History:    Arthritis    Knees bone on bone    Back problem    Cancer (HCC)    Easy bruising    ASA 81 mg daily    Glomerulonephritis    Hemorrhoids    Pregnancy    History of COVID-19    fatigue. not hospitalized. no continued symptoms    Kidney failure    Transplant 4/25/02    Osteoarthritis    Other and unspecified hyperlipidemia    Pain in joints    Type II or unspecified type diabetes mellitus without mention of complication, not stated as uncontrolled    Unspecified essential hypertension    Visual impairment    glasses / contacts    Wears glasses       PAST SURGICAL HISTORY:  Past Surgical History:   Procedure Laterality Date    Colonoscopy  2013, 2017    Organ transplant  2002    Kidney    Other surgical history  age 42    Renal transplant    Tonsillectomy  1964    Transplantation of kidney      4/25/02 at [facility]       FAMILY HISTORY:  family history includes Diabetes in her mother; Other in her mother and mother; Other (age of onset: 56) in her self.    SOCIAL HISTORY:   reports that she has never smoked. She has never used smokeless tobacco. She reports that she does not drink alcohol and does not use  drugs.    ALLERGIES:  Allergies   Allergen Reactions    Adhesive Tape OTHER (SEE COMMENTS)     Caused redness after being on for a length of time    Ace Inhibitors Coughing       MEDICATIONS:  Current Outpatient Medications on File Prior to Visit   Medication Sig Dispense Refill    traMADol 50 MG Oral Tab Take 1 tablet (50 mg total) by mouth every 6 (six) hours as needed for Pain. 30 tablet 0    losartan 50 MG Oral Tab Take 1 tablet (50 mg total) by mouth daily. 100 tablet 2    cycloSPORINE non-modified 25 MG Oral Cap TAKE 3 CAPSULES BY MOUTH EVERY  MORNING AND 2 CAPSULES EVERY  EVENING 450 capsule 3    predniSONE 5 MG Oral Tab Take 1 tablet (5 mg total) by mouth daily. 90 tablet 1    ASPIRIN LOW DOSE 81 MG Oral Tab EC Take 1 tablet (81 mg total) by mouth daily.      Mycophenolate Mofetil 500 MG Oral Tab Take 2 tablets (1,000 mg total) by mouth 2 (two) times daily. 360 tablet 3    [DISCONTINUED] atorvastatin 20 MG Oral Tab Take 1 tablet (20 mg total) by mouth daily. 90 tablet 3    Glucose Blood In Vitro Strip 1 each by Other route daily. 90 each 3    magnesium oxide 400 MG Oral Tab Take 1 tablet (400 mg total) by mouth 2 (two) times daily.      multivitamin Oral Tab Take 1 tablet by mouth daily.      Cinnamon 500 MG Oral Cap Take 1,000 mg by mouth 2 (two) times daily.      Omega 3 1000 MG Oral Cap Take 2,000 mg by mouth 2 (two) times daily.        Cholecalciferol (VITAMIN D) 1000 UNITS Oral Cap Take 1 capsule by mouth 2 (two) times daily.       No current facility-administered medications on file prior to visit.       REVIEW OF SYSTEMS:  Review of Systems   All other systems reviewed and are negative.       PHYSICAL EXAMINATION:  Neurologic Exam     Mental Status   Oriented to person, place, and time.     Cranial Nerves   Cranial nerves II through XII intact.     Motor Exam   Muscle bulk: normal  Overall muscle tone: normal  Right arm tone: normal  Left arm tone: normal  Right arm pronator drift: absent  Left arm  pronator drift: absent  Right leg tone: normal  Left leg tone: normal    Strength   Strength 5/5 throughout.     Sensory Exam   Light touch normal.   Vibration normal.   Proprioception normal.   Pinprick normal.     Gait, Coordination, and Reflexes     Gait  Gait: normal    Reflexes   Right brachioradialis: 2+  Left brachioradialis: 2+  Right biceps: 2+  Left biceps: 2+  Right triceps: 2+  Left triceps: 2+  Right patellar: 2+  Left patellar: 2+  Right achilles: 2+  Left achilles: 2+  Right : 2+  Left : 2+       IMAGING:  As above    ASSESSMENT:  RLE radiculopathy and LBP    Plan:  Recommend L4-S1 ALIF with posterior perc fixation  Obtain CT abdomen pelvis to assess anatomy prior to surgery    Kole Hdez, DO  Neurological Surgery  [facility]

## 2024-05-30 ENCOUNTER — TELEPHONE (OUTPATIENT)
Dept: PHYSICAL THERAPY | Age: 64
End: 2024-05-30

## 2024-05-30 ENCOUNTER — APPOINTMENT (OUTPATIENT)
Dept: PHYSICAL THERAPY | Age: 64
End: 2024-05-30
Attending: FAMILY MEDICINE
Payer: MEDICARE

## 2024-05-30 ENCOUNTER — HOSPITAL ENCOUNTER (OUTPATIENT)
Dept: CT IMAGING | Age: 64
Discharge: HOME | End: 2024-05-30
Attending: NEUROLOGICAL SURGERY
Payer: MEDICARE

## 2024-05-30 DIAGNOSIS — Z98.1 S/P LUMBAR FUSION: ICD-10-CM

## 2024-05-30 PROCEDURE — 74176 CT ABD & PELVIS W/O CONTRAST: CPT | Performed by: NEUROLOGICAL SURGERY

## 2024-05-30 NOTE — TELEPHONE ENCOUNTER
You are scheduled for L4-S1 ALIF; L4-S1 percutaneous screw placement on 7.11.24 with  at [facility].     PCP clearance is needed.  We have faxed a request for pre-op clearance to your PCP Dr Huber. Please contact their office for appointment.      You will need  pre operative labs which will include MRSA/MSSA testing.  You will need to contact the Pre-admission department at [phone] to schedule an appointment for your labs.     The Pre-Admission nurse will review your health history and give you information from the hospital. The nurse will also get you scheduled for all your pre-op testing required for your surgery.     Do not take any blood thinning medications such as over the counter non-steroidal antiinflammatories (advil, aleve, ibuprofen etc.), Aspirin,herbal supplements (garlic,tumeric etc.), vitamin E, fish oil or krill oil for at least 7 days prior to surgery. You may only take Tylenol, Extra Strength Tylenol, Arthritis Tylenol, or prescription Norco or Tramadol for pain if something is needed.    You should have nothing to eat or drink after 11:00pm the night prior to surgery except for the following:    Do drink 12 ounces of regular Gatorade (NOT RED) 12 hours and 4 hours prior to your scheduled surgery time, Do not drink any other liquids (including water) before your surgery. Do not chew gum or eat candies before surgery.  Take 1000 mg of Tylenol (Acetaminophen) 4 hours before your scheduled surgery time, take this with your scheduled Gatorade.    In order to prevent infection, you will need to purchase Hibiclens soap and use it after your regular body soap for 5 days prior to your procedure.  The last shower should be the night before surgery.  This soap can be found at any pharmacy in the first aid section. See detailed instructions below.      Our office will get prior authorization for surgery through your insurance.     Surgery is usually scheduled as a 1 day admission.  This is  an estimate and varies from person to person.  Ultimately, the surgeon will determine when you are ready to be discharged.    The hospital will contact you 1-2 days before surgery with your arrival time.     If you were on blood thinners (such as Coumadin, Pradaxa, Xarelto, etc) prior to surgery that we had you stop for surgery, please make sure you get instructions about when to resume the medication before you are discharged from the hospital.    Per Dr. Hdez's surgery protocol your appointments are as follows:     2 week pre-op surgical telehealth appointment is on 6.27.24 at 9:45 am with Cortney Harris RN    1 week pre-op surgical review scheduled on 7.3.24 at 11:40 am with Dr. Hdez.    1 week post-op appointment scheduled on 7.19.24 at 10:00 am with DAYNA Hartman    4 week post-op appointment scheduled on 8.9.24 at 10:20 am with Dr. Hdez    3 month post surgery appointment scheduled on 10.2.24 at 10:00 am with Dr. Hdez       Restrictions for after surgery:  Lifting restriction of 10 pounds or less.  No bending or twisting.  No prolonged sitting or standing.  Driving is restricted for the first 1-2 weeks (this will vary from surgeon to surgeon.)  Fusion patients may require bracing such as TLSO or LSO brace. Braces are custom made to fit the patient.  Patient's are to keep their incisions covered for the first 3 days post surgery. After that they may leave the incision open to air. It is better for the healing process if the incision is left open to air.   May shower after the third day.  Do not scrub the incision and avoid any lotions or creams to the incision.    Please make sure to arrive at least 15 minutes prior to your scheduled appointment in order to get checked in and roomed in a timely manner.  Depending on provider availability, late patients may be required to reschedule.  REFILLS:  After surgery, please remember that we do have a 48 hour refill policy that does not include weekends, please make  sure to request your medications in a timely manner so that you do not go days without medication.  *Refills should be requested through your pharmacy or through the refill request in [employer] (log in, go to medications, then select refill request).  [employer] MESSAGING:  Please remember that our office is closed during the weekend and no one is available for [employer] messages. If you have an urgent or emergent matter please go to a walk-in center or the emergency room. Also please remember your [employer] messages are part of your legal medical record and should not be utilized as a personal email with our providers as it is visible to all [facility] employees. Also, Since [employer] messages are not for emergent matters it may be several days before there is a response to your message.  FMLA/PAPERWORK COMPLETED BY OUR MEDICAL FORMS DEPARTMENT:  If you require FMLA paperwork for your surgery, please make sure to either Drop it off or have it faxed to our office at [phone]. Make sure it has your NAME, , and has your signature. You will need to have a Release of Information on file. To facilitate this process we ask that you requested it at the  on your way out and sign it. Without a signed CHANDRA or signature on the form we will not be able to fax it and this will cause a delay with your forms. Fees charged for forms are $25 for initial submission and $15 for recertifications. If you have questions on the status of your forms please call the forms department at [phone].  **We do have a 3 week policy for all forms and paperwork, please make sure to allow plenty of time for completion. Same day paperwork will not be completed. **    Spine Navigator  You will have a 30 minute telehealth visit with our spine navigator approximately 2 weeks before your surgery. This visit is NOT optional. This appointment will get booked when you schedule your spinal surgery.  When speaking with Pre-admissions you will be  told about a spine class as it relates to your surgery, this is an OPTIONAL class. The same or similar information will be discussed with you during your telehealth appointment with the spine navigator (Spine Navigator appointment is not optional). However, if you would like to have this information repeated to you or if you would feel more comfortable with additional information, you can elect to schedule this class during your pre-admissions phone call.  The Pre-op Spine Surgery Class is held at [facility] most Wednesdays from 3:30-4:30 pm. Call Pre-admission Testing (PAT) to register at:  [phone]. Please park in the [facility] Parking garage, check in at registration and meet in the [facility] lobby for your escort to class on the Ortho Spine unit. If unable to attend, but would like additional information, the class is available online at [email or web address].     Regarding current visitor information:    Please visit the following website for the detailed and up-to-date visitor screening and restriction policy at [email or web address].    Hibiclens Bathing  Hibiclens is a body soap that is used before surgery protect you from getting an infection after surgery   Hibiclens comes in a large blue bottle and can be found in most pharmacies in the First Aid supplies  Shower with this daily for FIVE consecutive days before surgery, using the entire bottle over the five days.  The last shower should be the night before surgery.   Steps to bathing with Hibiclens  Do not use Hibiclens on your hair, face or private areas  Wash your hair and face as normal with your usual cleansers  Rinse well  Using a clean wet washcloth apply enough Hibiclens to cover your body. Wash from the neck down avoiding the genital areas and concentrating on the surgical area  Rinse well  Dry yourself with a clean, dry towel  Do not use any powders, creams, lotions or sprays on your body as  these attract bacteria  Deodorant and facial creams are acceptable.   (Laundering/cleaning: Chlorhexidine gluconate skin cleansers will cause stains if used with chlorine releasing products. Rinse completely and use only non-chlorine detergents.)    For Office Use Only:    Medical Clearances Needed: PCP  PA: CLARENCE PINTO  CPT Codes:

## 2024-05-30 NOTE — TELEPHONE ENCOUNTER
Feeling a little better, sleeping better now.    Getting surgery July 11, 2024.     Confirmed next appointment.

## 2024-05-31 ENCOUNTER — OFFICE VISIT (OUTPATIENT)
Dept: PAIN CLINIC | Facility: CLINIC | Age: 64
End: 2024-05-31

## 2024-05-31 VITALS
DIASTOLIC BLOOD PRESSURE: 74 MMHG | HEART RATE: 74 BPM | BODY MASS INDEX: 34 KG/M2 | SYSTOLIC BLOOD PRESSURE: 118 MMHG | WEIGHT: 176 LBS | OXYGEN SATURATION: 98 %

## 2024-05-31 DIAGNOSIS — M54.16 LUMBAR RADICULOPATHY: Primary | ICD-10-CM

## 2024-05-31 PROCEDURE — 3074F SYST BP LT 130 MM HG: CPT | Performed by: PHYSICIAN ASSISTANT

## 2024-05-31 PROCEDURE — 99214 OFFICE O/P EST MOD 30 MIN: CPT | Performed by: PHYSICIAN ASSISTANT

## 2024-05-31 PROCEDURE — 3078F DIAST BP <80 MM HG: CPT | Performed by: PHYSICIAN ASSISTANT

## 2024-05-31 PROCEDURE — 1160F RVW MEDS BY RX/DR IN RCRD: CPT | Performed by: PHYSICIAN ASSISTANT

## 2024-05-31 PROCEDURE — 1159F MED LIST DOCD IN RCRD: CPT | Performed by: PHYSICIAN ASSISTANT

## 2024-05-31 NOTE — PROGRESS NOTES
Last procedure: Right L5 TRANSFORAMINAL LUMBAR EPIDURAL STEROID INJECTION SINGLE LEVEL with local  Date: 05/20/24  Percentage of relief obtained: 100%  Duration of relief:  current    Current Pain Score: 2

## 2024-05-31 NOTE — PATIENT INSTRUCTIONS
Refill policies:    Allow 2-3 business days for refills; controlled substances may take longer.  Contact your pharmacy at least 5 days prior to running out of medication and have them send an electronic request or submit request through the “request refill” option in your [employer] account.  Refills are not addressed on weekends; covering physicians do not authorize routine medications on weekends.  No narcotics or controlled substances are refilled after noon on Fridays or by on call physicians.  By law, narcotics must be electronically prescribed.  A 30 day supply with no refills is the maximum allowed.  If your prescription is due for a refill, you may be due for a follow up appointment.  To best provide you care, patients receiving routine medications need to be seen at least once a year.  Patients receiving narcotic/controlled substance medications need to be seen at least once every 3 months.  In the event that your preferred pharmacy does not have the requested medication in stock (e.g. Backordered), it is your responsibility to find another pharmacy that has the requested medication available.  We will gladly send a new prescription to that pharmacy at your request.    Scheduling Tests:    If your physician has ordered radiology tests such as MRI or CT scans, please contact Central Scheduling at [phone] right away to schedule the test.  Once scheduled, the [facility] Centralized Referral Team will work with your insurance carrier to obtain pre-certification or prior authorization.  Depending on your insurance carrier, approval may take 3-10 days.  It is highly recommended patients assure they have received an authorization before having a test performed.  If test is done without insurance authorization, patient may be responsible for the entire amount billed.      Precertification and Prior Authorizations:  If your physician has recommended that you have a procedure or additional testing performed the [facility]  Centralized Referral Team will contact your insurance carrier to obtain pre-certification or prior authorization.    You are strongly encouraged to contact your insurance carrier to verify that your procedure/test has been approved and is a COVERED benefit.  Although the [facility] Centralized Referral Team does its due diligence, the insurance carrier gives the disclaimer that \"Although the procedure is authorized, this does not guarantee payment.\"    Ultimately the patient is responsible for payment.   Thank you for your understanding in this matter.  Paperwork Completion:  If you require FMLA or disability paperwork for your recovery, please make sure to either drop it off or have it faxed to our office at [phone]. Be sure the form has your name and date of birth on it.  The form will be faxed to our Forms Department and they will complete it for you.  There is a 25$ fee for all forms that need to be filled out.  Please be aware there is a 10-14 day turnaround time.  You will need to sign a release of information (CHANDRA) form if your paperwork does not come with one.  You may call the Forms Department with any questions at [phone].  Their fax number is [phone].

## 2024-05-31 NOTE — PROGRESS NOTES
Patient has been scheduled for L4-S1 ALIF with posterior perc fixation on 7/11/24 with Dr. Hdez at [facility].  Patient Requires a LSO brace to restrict motion of the torso to reduce pain and promote healing.

## 2024-05-31 NOTE — TELEPHONE ENCOUNTER
Patient is scheduled for  L4-S1 ALIF; L4-S1 percutaneous screw placement on 7.11.24 with  at [facility].     Y pre-op apt scheduled (if sx is more than 30 days from last apt)  Ypre-op apt scheduled with RN spine navigator  Y Surgical instructions reviewed by nursing staff with patient  Y  form completed  Y Surgery order signed   Y Placed sx on surgery sheet  Y Placed on outlook calendar  Y Mailsuitet message sent to patient with sx instructions  Y Faxed pre-op clearance request to PCP LAURA   NA Faxed letter to prescribing provider requesting anticoagulants be held for surgery  NA E-mail sent to [email or web address] [id number]  Y Post-op appointments made  Y PA NEEDED. Routed to PA team to initiate.  Y Post-Op outreach pt reminder placed.   Y Entire Neurosurgery Checklist Completed    Clearances: PCP  PA:NEEDED [facility]  CPT Codes: 83207, 59971, 60152, 57521, 82097, 89812, 06241, 23535

## 2024-05-31 NOTE — PROGRESS NOTES
HPI:   Brinda Max presents with complaints of Low back pain radiating to R LE .    The pain is described as severe aching, shooting that is intermittent.  The patient’s activity level has remained the same since last visit.  The pain is worst unrelated to time of day.    Changes in condition/history since last visit: Patient is here today for follow-up, having had right L5-S1 TF-AZ on 5/20/2024.  Procedure was well-tolerated, and had no adverse effects.  Overall, reports 100% improvement in pain, and was very pleased with her response.  Unfortunately, this proved to be quite brief, and after 2 to 3 days, pain had already began to return, and has long since returned to baseline.  She has since followed with her surgeon, Dr. Hdez, who has recommended an L4-S1 ALIF, tentatively scheduled for 7/11/2024.    Last procedure: right L5/S1 TF-AZ    Date: 5/20/24    Percentage of relief experienced from the procedure: 100%    Duration of the relief: 2 to 3 days    The following activities will increase the patient’s pain: walking, standing    The following activities decrease the patient’s pain: limiting activity level    Functional Assessment: Patient reports that they are able to complete all of their ADL's such as eating, bathing, using the toilet, dressing and getting up from a bed or a chair independently.    Current Medications:  Current Outpatient Medications   Medication Sig Dispense Refill    ATORVASTATIN 20 MG Oral Tab TAKE 1 TABLET BY MOUTH DAILY 100 tablet 2    diazePAM 10 MG Oral Tab Take 0.5 tablets (5 mg total) by mouth nightly as needed. 30 tablet 0    traMADol 50 MG Oral Tab Take 1 tablet (50 mg total) by mouth every 6 (six) hours as needed for Pain. 30 tablet 0    losartan 50 MG Oral Tab Take 1 tablet (50 mg total) by mouth daily. 100 tablet 2    cycloSPORINE non-modified 25 MG Oral Cap TAKE 3 CAPSULES BY MOUTH EVERY  MORNING AND 2 CAPSULES EVERY  EVENING 450 capsule 3    predniSONE 5 MG Oral Tab  Take 1 tablet (5 mg total) by mouth daily. 90 tablet 1    ASPIRIN LOW DOSE 81 MG Oral Tab EC Take 1 tablet (81 mg total) by mouth daily.      Mycophenolate Mofetil 500 MG Oral Tab Take 2 tablets (1,000 mg total) by mouth 2 (two) times daily. 360 tablet 3    Glucose Blood In Vitro Strip 1 each by Other route daily. 90 each 3    magnesium oxide 400 MG Oral Tab Take 1 tablet (400 mg total) by mouth 2 (two) times daily.      multivitamin Oral Tab Take 1 tablet by mouth daily.      Cinnamon 500 MG Oral Cap Take 1,000 mg by mouth 2 (two) times daily.      Omega 3 1000 MG Oral Cap Take 2,000 mg by mouth 2 (two) times daily.        Cholecalciferol (VITAMIN D) 1000 UNITS Oral Cap Take 1 capsule by mouth 2 (two) times daily.        Patient requires assistance with: No assistance required    Reviewed Patient History Dated: 5/20/24 no changes noted    Physical Exam:   /74   Pulse 74   Wt 176 lb (79.8 kg)   SpO2 98%   BMI 34.37 kg/m²   VAS Pain Score:  2/10  General Appearance: Well developed, well nourished, normal build, independent body habitus, no apparent physical disabilities, well groomed    Neurological Exam: WNL-Orientation to time, place and person, normal mood & effect, normal concentration & attention span  Inspection: antalgic, no acute distress  Radiology/Lab Test Reviewed: MRI L-spine 4/26/2024:     CONCLUSION:       1. L5-S1 right lateral disc herniation extending into the right neural foramen with mass effect on the right L5 nerve root causing severe right neural foraminal narrowing.  There is moderate bilateral facet joint degenerative changes at the L5-S1 level.      2. L4-5 grade 2 anterolisthesis of L4 on L5 secondary to severe left greater than right facet joint degenerative change.  There is degenerative disc bulge contributing to moderate bilateral subarticular zone and neural foraminal narrowing and moderate central canal stenosis.               Lab Results   Component Value Date    WBC 6.9  04/17/2024    WBC 10.2 08/28/2023    WBC 6.6 07/19/2023   No results found for: \"HEMOGLOBIN\"  Lab Results   Component Value Date    .0 04/17/2024    .0 08/28/2023    .0 07/19/2023     Do you have any known blood/bleeding disorders?  No  Does patient currently take blood thinners?   None  Does patient currently take any antibiotics?   No  Patient educated and verbalized understanding.  Medical Decision Making:   Diagnosis:    Encounter Diagnosis   Name Primary?    Lumbar radiculopathy Yes     Impression: 100% relief of pain with right L5-S1 TF-AZ, though after 2 to 3 days, pain had already began to return, and is long since returned to baseline.  She has followed with her surgeon, Dr. Ramachandran, and is scheduled for surgery on 7/11/2024.  Will have her simply follow-up with surgical recommendations, and we will see her back on an as-needed basis.    Plan: Patient to follow up PRN.  Patient is scheduled for surgery on 7/11/2024, we will see her back on an as-needed basis.    No orders of the defined types were placed in this encounter.      Meds & Refills for this Visit:  Requested Prescriptions      No prescriptions requested or ordered in this encounter       Imaging & Consults:  None    The patient indicates understanding of these issues and agrees to the plan.    JOSÉ MIGUEL Ralph

## 2024-06-04 ENCOUNTER — OFFICE VISIT (OUTPATIENT)
Dept: PHYSICAL THERAPY | Age: 64
End: 2024-06-04
Attending: FAMILY MEDICINE
Payer: MEDICARE

## 2024-06-04 PROCEDURE — 97110 THERAPEUTIC EXERCISES: CPT

## 2024-06-04 PROCEDURE — 97140 MANUAL THERAPY 1/> REGIONS: CPT

## 2024-06-04 NOTE — PROGRESS NOTES
Insurance (Authorized # of Visits):  [facility]         Authorizing Physician: Dr. Huber  Next MD visit: none scheduled    Fall Risk: standard         Precautions: n/a             Diagnosis:   Acute right-sided low back pain with right-sided sciatica (M54.41)        Referring Provider: Curtis Huber   Date of Evaluation:    5/2/2024  Precautions:   Cancer  Next MD visit:   none scheduled  Date of Surgery: n/a    Subjective: Did some of home program. Better, sleeping better and less pain, more just stiffness than pain.  Brinda Max is a 64 year old female who presents to therapy today with complaints of low back pain and down front of right leg toes and right foot swelling.     Constant pain.     Pt describes pain level at worst 2/10.   Current functional limitations include walking, standing, sitting, housework. Sleeping (prefers stomach or side)     Better: flexion in sitting     Describes obstruction    Pt goals include reduce pain.      Objective:   (Pain with *)  Tested 6/4/2024:    Gait: pt ambulates on level ground with antalgia.     Lumbar AROM: (* denotes performed with pain)  Sidebending: R min; L min (pain with shift correction either direction, min loss)* (tested 5/7/2024)  In doorway 16 inches, 2 fingers L side glide, 3 fingers R side glide    Tested 5/28/2024:  Strength: (* denotes performed with pain)  LE     Hip abduction: R 3-/5; L 3-/5 (tested 5/7/2024)    PF (S1): R 3+/5; L 3+/5        Tested 5/7/2024:  Sacral distraction*    Tested 5/2/2024  Neuro Screen: sensation, 0% great toe sensation on R side.    Assessment: Reports less pain and improved ability to sleep. Home program progressed for functional mobility.     Goals: (to be met in 17 visits)   Pt will improve MATHEW score from 54/100 to 27/100 to display improved ability to walking, standing, sitting, housework MET  Pt will reduce pain at its worst from 9/10 to 6/10 to allow for improved ability to walking, standing, sitting, housework MET  Pt  will plantar flexion strength from 2+/5 with pain to 3+/5 without pain allow for improved ability to walking, standing, sitting, housework MET  Pt will be independent with home program to allow for maintenance of goals achieved in therapy. (95% progress 5/28/2024)    New Goals on 5/28/2024:  Pt will improve MATHEW score from 54/100 to 27/100 to display improved ability to walking, standing, sitting, housework  Pt will reduce pain at its worst from 9/10 to 6/10 to allow for improved ability to walking, standing, sitting, housework       Post Oswestry Disability Index Score  Post Score: 16 % (5/28/2024 12:12 PM)    38 % improvement    Plan:   Likely discharge next visit    Reduce limp with gait is a goal    Exhaust flexion/rotation knees to the R, patient forces first  Then prone lying    Alternative lateral, never done since injection:  R side glide (hips to the L)  Thoracic rotation either side    See if marching sitting with opposite arm and leg improves gait,    -what about same leg and same arm, done first by accident last visit and was a bit better gait    Core strength  Lunge  Squat  Rows with band    Note worse:  Straight leg raise, cut out not like popping in hip  Standing hip abduction  Sidelying hip abduction: too weak  Step forward with arm movement    Not tested:  Sacral compression     Charges: 2 there ex, 1 manual therapy    Total Timed Treatment: 38 min  Total Treatment Time: 38 min  Date: 5/21/2024  Tx#: 6 5/28/2024  Tx# 7 6/4/2024  Tx#: 8     There ex 30 minutes:    dead lift with purse x10x3    Bridge x20x2    Straight leg raise x20x2     Clam x25x2    Plantar flexion double leg 20xx    Prone hip abduction x12x2: a bit better    Toe flexion standing x10: no effect    Gait with cues visual, verbal 4 minutes    Educated on home program, see below.  Verbal, visual and tactile cues for there ex.   There ex 22 minutes:    dead lift with purse x10x    Bridge x20x    Straight leg raise x20x      Plantar  flexion double leg 20x    Prone hip abduction x12x    Step forward, sideways and backwards in doorway with or without arm movement 10-20    L side glide (hips to the R) x20 and shift correction: no effect    Extension in standing with overpressure L5 x10: no effect    Flexion/rotation knees to the R 1-3 minutes x4: 2 sets therapist overpressure: a bit better    Flexion/rotation knees to the L 3 minutes: therapist overpressure: worse    Educated on home program, see below.  Verbal, visual and tactile cues for there ex. There ex 30 minutes:  Step forward, sideways and backwards in doorway with or without arm movement 10x    Gait with cues in mirror, visual, verbal 4 minutes    Flexion/rotation knees to the R 1-3 minutes x4: 2 sets therapist overpressure: a bit better    Prone lying 60': a bit better    Prone press up x10: worse      Prone press up hips to the L x10: no effect    Marching opposite arm and leg 30'x3: a bit better    Lower ab strength supine one leg up down 10x2    Bridge single leg bias x10x2: a bit better        Educated on home program, see below.  Verbal, visual and tactile cues for there ex.       Neuro re-education: 8 minutes:  Re-assessed progresses, discussed plan of care.    Taught form for more advanced exercises.    Slouch overcorrection without slumping 10x: no effect (did not do sustained) x4 sets    Practiced pillow move to chair and back Neuro re-education: 8 minutes:  Re-assessed progresses, discussed plan of care.                  Manual therapy 8 minutes:  Extension/rotation mobilization on L side of back and flexion/rotation with therapist overpressure 3 minutes Manual therapy 8 minutes:  Extension/rotation mobilization on L side of back and flexion/rotation with therapist overpressure 3 minutes

## 2024-06-04 NOTE — TELEPHONE ENCOUNTER
Provider has reviewed and signed DME orders for the following:   Bone growth stimulator-lumbar for [facility] and LSO brace for [facility].    BGS order faxed to [facility]  Received fax confirmation   Copy sent to scan     LSO brace order for [facility]  Received fax confirmation   Copy sent to scan

## 2024-06-10 ENCOUNTER — TELEPHONE (OUTPATIENT)
Dept: SURGERY | Facility: CLINIC | Age: 64
End: 2024-06-10

## 2024-06-10 NOTE — TELEPHONE ENCOUNTER
Tremaine detailed written order from Marshall ESPINOZA 06/10/24. Placed in nurse bin for review and endorsement.

## 2024-06-10 NOTE — TELEPHONE ENCOUNTER
Prior Authorization for Inpatient surgery initiated with Kelsie AGUILAR at [facility] [phone].  Requesting coverage for:L4-S1 ALIF; L4-S1 percutaneous screw placement   Date of Service: 07/11/2024   Inpatient days requested:Inpatient   CPT codes: 62110, 12554, 16609, 06561, 07544, 67306, 00745, 40492    Request for surgery pending review, pending reference #[id number]. Clinical reviewer will reach out for clinicals.

## 2024-06-11 ENCOUNTER — OFFICE VISIT (OUTPATIENT)
Dept: PHYSICAL THERAPY | Age: 64
End: 2024-06-11
Attending: FAMILY MEDICINE
Payer: MEDICARE

## 2024-06-11 PROCEDURE — 97112 NEUROMUSCULAR REEDUCATION: CPT

## 2024-06-11 PROCEDURE — 97110 THERAPEUTIC EXERCISES: CPT

## 2024-06-11 PROCEDURE — 97140 MANUAL THERAPY 1/> REGIONS: CPT

## 2024-06-11 NOTE — PROGRESS NOTES
Discharge Summary  Pt has attended 9 visits in Physical Therapy.       Insurance (Authorized # of Visits):  [facility] Medicare         Authorizing Physician: Dr. Huber  Next MD visit: none scheduled    Fall Risk: standard         Precautions: n/a             Diagnosis:   Acute right-sided low back pain with right-sided sciatica (M54.41)        Referring Provider: Curtis Huber   Date of Evaluation:    5/2/2024  Precautions:   Cancer  Next MD visit:   none scheduled  Date of Surgery: n/a    Subjective: Did some of home program. A bit better, sleeping better and less pain, more just stiffness than pain. Walking easier. No longer any issue sleeping or walking.  Brinda Max is a 64 year old female who presents to therapy today with complaints of low back pain and down front of right leg toes and right foot swelling.     Constant pain.     Pt describes pain level at worst 2/10.   Current functional limitations include walking, standing as well as sleeping (prefers stomach or side)     Better: flexion in sitting     Describes obstruction    Pt goals include reduce pain.      Objective:   (Pain with *)  Tested 6/11/2024:  Gait: pt ambulates on level ground with antalgia.     Lumbar AROM: (* denotes performed with pain)  Sidebending: R min; L min (pain with shift correction either direction, min loss)* (tested 5/7/2024)  In doorway 16 inches, 2 fingers L side glide, 4 fingers R side glide    Tested 5/28/2024:  Strength: (* denotes performed with pain)  LE     Hip abduction: R 3-/5; L 3-/5 (tested 5/7/2024)    PF (S1): R 3+/5; L 3+/5        Tested 5/7/2024:  Sacral distraction*    Tested 5/2/2024  Neuro Screen: sensation, 0% great toe sensation on R side.    Assessment: Limited progress last few visits, patient will proceed to surgery next month. Overall less pain and functional score has improved almost 30%. Improved ability to sit and do housework.  Discharged to home program.     Goals: (to be met in 17 visits)   Pt will  improve MATHEW score from 54/100 to 27/100 to display improved ability to walking, standing, sitting, housework (95% progress on 6/11/2024)  Pt will reduce pain at its worst from 9/10 to 6/10 to allow for improved ability to walking, standing, sitting, housework MET  Pt will plantar flexion strength from 2+/5 with pain to 3+/5 without pain allow for improved ability to walking, standing, sitting, housework MET  Pt will be independent with home program to allow for maintenance of goals achieved in therapy. (80% progress 6/11/2024)    New Goals on 5/28/2024:  Pt will improve MATHEW score from 54/100 to 27/100 to display improved ability to walking, standing, sitting, housework IN PROGRESS  Pt will reduce pain at its worst from 6/10 to 3/10 to allow for improved ability to walking, standing, sitting, housework MET      Post Oswestry Disability Index Score  Post Score: 28 % (6/11/2024 10:35 AM)    26 % improvement    Plan: Continue skilled HOME PROGRAM    Patient/Family/Caregiver was advised of these findings, precautions, and treatment options and has agreed to actively participate in planning and for this course of care.    Thank you for your referral. If you have any questions, please contact me at Dept: [phone].    Sincerely,  Electronically signed by therapist: Cricket Merritt, ISAAC     Physician's certification required:  No  Please co-sign or sign and return this letter via fax as soon as possible to [phone].   I certify the need for these services furnished under this plan of treatment and while under my care.    X___________________________________________________ Date____________________    Certification From: 6/11/2024  To:9/9/2024        Additional ideas:  Reduce limp with gait is a goal    Exhaust flexion/rotation knees to the R, patient forces first  Then prone lying    Alternative lateral, never done since injection:  R side glide (hips to the L)  Thoracic rotation either side    See if marching sitting  with opposite arm and leg improves gait,    -what about same leg and same arm, done first by accident last visit and was a bit better gait    Core strength  Lunge  Squat  Rows with band    Note worse:  Straight leg raise, cut out not like popping in hip  Standing hip abduction  Sidelying hip abduction: too weak  Step forward with arm movement    Not tested:  Sacral compression     Charges: 1 neuro re-education, 1 there ex, 1 manual therapy    Total Timed Treatment: 38 min  Total Treatment Time: 38 min  Date: 5/21/2024  Tx#: 6 5/28/2024  Tx# 7 6/4/2024  Tx#: 8 6/11/2024  Tx#: 9    There ex 30 minutes:    dead lift with purse x10x3    Bridge x20x2    Straight leg raise x20x2     Clam x25x2    Plantar flexion double leg 20xx    Prone hip abduction x12x2: a bit better    Toe flexion standing x10: no effect    Gait with cues visual, verbal 4 minutes    Educated on home program, see below.  Verbal, visual and tactile cues for there ex.   There ex 22 minutes:    dead lift with purse x10x    Bridge x20x    Straight leg raise x20x      Plantar flexion double leg 20x    Prone hip abduction x12x    Step forward, sideways and backwards in doorway with or without arm movement 10-20    L side glide (hips to the R) x20 and shift correction: no effect    Extension in standing with overpressure L5 x10: no effect    Flexion/rotation knees to the R 1-3 minutes x4: 2 sets therapist overpressure: a bit better    Flexion/rotation knees to the L 3 minutes: therapist overpressure: worse    Educated on home program, see below.  Verbal, visual and tactile cues for there ex. There ex 30 minutes:  Step forward, sideways and backwards in doorway with or without arm movement 10x    Gait with cues in mirror, visual, verbal 4 minutes    Flexion/rotation knees to the R 1-3 minutes x4: 2 sets therapist overpressure: a bit better    Prone lying 60': a bit better    Prone press up x10: worse      Prone press up hips to the L x10: no effect    Marching  opposite arm and leg 30'x3: a bit better    Lower ab strength supine one leg up down 10x2    Bridge single leg bias x10x2: a bit better        Educated on home program, see below.  Verbal, visual and tactile cues for there ex.   There ex 22 minutes:    Gait with cues in mirror, visual, verbal 2 minutes    Flexion/rotation knees to the R 1-3 minutes x3: 2 sets therapist overpressure: a bit better     Prone press up x10x2: better      Thoracic rotation R x10 overpressure: worse    Marching opposite arm and leg 30'x2: no effect (or with same arm)    Lower ab strength supine one leg up down 10x    Bridge single leg bias x10x: a bit better    Push down on counter x10-20x2    Bird dog one arm 30'x2    Standing hip abduction 5-15x2    Dead lift 12lbs x20: worse (more pain, reduced after lumbar stretching see above)    Educated on home program, see below.  Verbal, visual and tactile cues for there ex.      Neuro re-education: 8 minutes:  Re-assessed progresses, discussed plan of care.    Taught form for more advanced exercises.    Slouch overcorrection without slumping 10x: no effect (did not do sustained) x4 sets    Practiced pillow move to chair and back Neuro re-education: 8 minutes:  Re-assessed progresses, discussed plan of care.      Neuro re-education: 8 minutes:  Re-assessed progresses, discussed plan of care.            Manual therapy 8 minutes:  Extension/rotation mobilization on L side of back and flexion/rotation with therapist overpressure 3 minutes Manual therapy 8 minutes:  Extension/rotation mobilization on L side of back and flexion/rotation with therapist overpressure 3 minutes Manual therapy 8 minutes:  Extension/rotation mobilization on L side of back and flexion/rotation with therapist overpressure 3 minutes    Thoracic R rotation

## 2024-06-13 ENCOUNTER — TELEPHONE (OUTPATIENT)
Dept: SURGERY | Facility: CLINIC | Age: 64
End: 2024-06-13

## 2024-06-13 RX ORDER — ACETAMINOPHEN 500 MG
1000 TABLET ORAL ONCE
Status: CANCELLED | OUTPATIENT
Start: 2024-06-13 | End: 2024-06-13

## 2024-06-13 RX ORDER — SODIUM CHLORIDE, SODIUM LACTATE, POTASSIUM CHLORIDE, CALCIUM CHLORIDE 600; 310; 30; 20 MG/100ML; MG/100ML; MG/100ML; MG/100ML
INJECTION, SOLUTION INTRAVENOUS CONTINUOUS
Status: CANCELLED | OUTPATIENT
Start: 2024-06-13

## 2024-06-13 NOTE — TELEPHONE ENCOUNTER
Received medical records request from [facility] and sent via scan stat for processing .

## 2024-06-19 NOTE — TELEPHONE ENCOUNTER
Rcvd detailed written order from [facility] 06/10/24      paperwork reviewed and signed by provider, paperwork      [facility] D[facility] Order has been faxed to the following # [phone]     DME Order  Face sheet  Last office visit note      Confirmation received

## 2024-06-25 ENCOUNTER — EKG ENCOUNTER (OUTPATIENT)
Dept: LAB | Age: 64
End: 2024-06-25
Attending: NEUROLOGICAL SURGERY
Payer: MEDICARE

## 2024-06-25 ENCOUNTER — LABORATORY ENCOUNTER (OUTPATIENT)
Dept: LAB | Age: 64
End: 2024-06-25
Attending: NEUROLOGICAL SURGERY
Payer: MEDICARE

## 2024-06-25 DIAGNOSIS — Z01.818 PRE-OP TESTING: ICD-10-CM

## 2024-06-25 LAB
ANTIBODY SCREEN: NEGATIVE
APTT PPP: 24.6 SECONDS (ref 23–36)
BILIRUB UR QL STRIP.AUTO: NEGATIVE
CLARITY UR REFRACT.AUTO: CLEAR
COLOR UR AUTO: YELLOW
GLUCOSE UR STRIP.AUTO-MCNC: NORMAL MG/DL
HYALINE CASTS #/AREA URNS AUTO: PRESENT /LPF
INR BLD: 0.95 (ref 0.8–1.2)
KETONES UR STRIP.AUTO-MCNC: NEGATIVE MG/DL
LEUKOCYTE ESTERASE UR QL STRIP.AUTO: 500
NITRITE UR QL STRIP.AUTO: NEGATIVE
PH UR STRIP.AUTO: 5.5 [PH] (ref 5–8)
PROTHROMBIN TIME: 12.7 SECONDS (ref 11.6–14.8)
RBC UR QL AUTO: NEGATIVE
RH BLOOD TYPE: POSITIVE
SP GR UR STRIP.AUTO: 1.02 (ref 1–1.03)
T4 FREE SERPL-MCNC: 1 NG/DL (ref 0.8–1.7)
TSI SER-ACNC: 1.6 MIU/ML (ref 0.36–3.74)
UROBILINOGEN UR STRIP.AUTO-MCNC: NORMAL MG/DL

## 2024-06-25 PROCEDURE — 86901 BLOOD TYPING SEROLOGIC RH(D): CPT

## 2024-06-25 PROCEDURE — 86900 BLOOD TYPING SEROLOGIC ABO: CPT

## 2024-06-25 PROCEDURE — 84443 ASSAY THYROID STIM HORMONE: CPT | Performed by: FAMILY MEDICINE

## 2024-06-25 PROCEDURE — 87086 URINE CULTURE/COLONY COUNT: CPT

## 2024-06-25 PROCEDURE — 87077 CULTURE AEROBIC IDENTIFY: CPT

## 2024-06-25 PROCEDURE — 87081 CULTURE SCREEN ONLY: CPT

## 2024-06-25 PROCEDURE — 86850 RBC ANTIBODY SCREEN: CPT

## 2024-06-25 PROCEDURE — 93005 ELECTROCARDIOGRAM TRACING: CPT

## 2024-06-25 PROCEDURE — 36415 COLL VENOUS BLD VENIPUNCTURE: CPT

## 2024-06-25 PROCEDURE — 93010 ELECTROCARDIOGRAM REPORT: CPT | Performed by: INTERNAL MEDICINE

## 2024-06-25 PROCEDURE — 84439 ASSAY OF FREE THYROXINE: CPT | Performed by: FAMILY MEDICINE

## 2024-06-25 PROCEDURE — 81001 URINALYSIS AUTO W/SCOPE: CPT

## 2024-06-25 PROCEDURE — 85730 THROMBOPLASTIN TIME PARTIAL: CPT

## 2024-06-25 PROCEDURE — 85610 PROTHROMBIN TIME: CPT

## 2024-06-26 ENCOUNTER — OFFICE VISIT (OUTPATIENT)
Dept: FAMILY MEDICINE CLINIC | Facility: CLINIC | Age: 64
End: 2024-06-26

## 2024-06-26 ENCOUNTER — TELEPHONE (OUTPATIENT)
Dept: SURGERY | Facility: CLINIC | Age: 64
End: 2024-06-26

## 2024-06-26 VITALS
TEMPERATURE: 97 F | BODY MASS INDEX: 32.52 KG/M2 | HEART RATE: 110 BPM | HEIGHT: 61 IN | WEIGHT: 172.25 LBS | RESPIRATION RATE: 16 BRPM | SYSTOLIC BLOOD PRESSURE: 124 MMHG | DIASTOLIC BLOOD PRESSURE: 74 MMHG | OXYGEN SATURATION: 97 %

## 2024-06-26 DIAGNOSIS — E11.9 TYPE 2 DIABETES MELLITUS WITHOUT COMPLICATION, WITHOUT LONG-TERM CURRENT USE OF INSULIN (HCC): ICD-10-CM

## 2024-06-26 DIAGNOSIS — Z94.0 RENAL TRANSPLANT RECIPIENT (HCC): ICD-10-CM

## 2024-06-26 DIAGNOSIS — I10 PRIMARY HYPERTENSION: ICD-10-CM

## 2024-06-26 DIAGNOSIS — Z01.818 PREOP EXAMINATION: Primary | ICD-10-CM

## 2024-06-26 DIAGNOSIS — M54.31 SCIATICA, RIGHT SIDE: ICD-10-CM

## 2024-06-26 DIAGNOSIS — M43.16 SPONDYLOLISTHESIS OF LUMBAR REGION: ICD-10-CM

## 2024-06-26 LAB
ATRIAL RATE: 61 BPM
P AXIS: 54 DEGREES
P-R INTERVAL: 204 MS
Q-T INTERVAL: 404 MS
QRS DURATION: 74 MS
QTC CALCULATION (BEZET): 406 MS
R AXIS: -3 DEGREES
T AXIS: 23 DEGREES
VENTRICULAR RATE: 61 BPM

## 2024-06-26 PROCEDURE — 1160F RVW MEDS BY RX/DR IN RCRD: CPT | Performed by: FAMILY MEDICINE

## 2024-06-26 PROCEDURE — 99215 OFFICE O/P EST HI 40 MIN: CPT | Performed by: FAMILY MEDICINE

## 2024-06-26 PROCEDURE — 3074F SYST BP LT 130 MM HG: CPT | Performed by: FAMILY MEDICINE

## 2024-06-26 PROCEDURE — 3078F DIAST BP <80 MM HG: CPT | Performed by: FAMILY MEDICINE

## 2024-06-26 PROCEDURE — 1159F MED LIST DOCD IN RCRD: CPT | Performed by: FAMILY MEDICINE

## 2024-06-26 PROCEDURE — 3008F BODY MASS INDEX DOCD: CPT | Performed by: FAMILY MEDICINE

## 2024-06-26 NOTE — PROGRESS NOTES
Brinda Max is a 64 year old female who presents for a pre-operative physical exam. Patient is to have L4-S1 ALIF, L4-1 percutaneous screw placement on 7/11/24 with Dr Hdez at [facility]       HPI:   Pt complains of having had persistent low back pain, for an extended period of time. She has been doing PT with no improvement, she still has numbness in her foot and has right sided pain. Cannot recall specific trauma. MRI of the lumbar spine showed Grade 2 anterolisthesis of L4 on L5 secondary to severe left greater than right facet joint degenerative change. moderate broad-based degenerative disc bulge causing moderate central canal stenosis of 7.5 mm. Right lateral disc herniation extending to the right neural foramen with mass effect on the exiting right L5 nerve root and severe right neural foraminal narrowing.     Current Outpatient Medications   Medication Sig Dispense Refill    levoFLOXacin (LEVAQUIN) 500 MG Oral Tab Take 1 tablet (500 mg total) by mouth daily. 5 tablet 0    ATORVASTATIN 20 MG Oral Tab TAKE 1 TABLET BY MOUTH DAILY 100 tablet 2    diazePAM 10 MG Oral Tab Take 0.5 tablets (5 mg total) by mouth nightly as needed. 30 tablet 0    losartan 50 MG Oral Tab Take 1 tablet (50 mg total) by mouth daily. 100 tablet 2    cycloSPORINE non-modified 25 MG Oral Cap TAKE 3 CAPSULES BY MOUTH EVERY  MORNING AND 2 CAPSULES EVERY  EVENING 450 capsule 3    predniSONE 5 MG Oral Tab Take 1 tablet (5 mg total) by mouth daily. 90 tablet 1    ASPIRIN LOW DOSE 81 MG Oral Tab EC Take 1 tablet (81 mg total) by mouth daily.      Mycophenolate Mofetil 500 MG Oral Tab Take 2 tablets (1,000 mg total) by mouth 2 (two) times daily. 360 tablet 3    Glucose Blood In Vitro Strip 1 each by Other route daily. 90 each 3    magnesium oxide 400 MG Oral Tab Take 1 tablet (400 mg total) by mouth 2 (two) times daily.      multivitamin Oral Tab Take 1 tablet by mouth daily.      Cinnamon 500 MG Oral Cap Take 1,000 mg by mouth 2  (two) times daily.      Omega 3 1000 MG Oral Cap Take 2,000 mg by mouth 2 (two) times daily.        Cholecalciferol (VITAMIN D) 1000 UNITS Oral Cap Take 1 capsule by mouth 2 (two) times daily.      traMADol 50 MG Oral Tab Take 1 tablet (50 mg total) by mouth every 6 (six) hours as needed for Pain. (Patient not taking: Reported on 6/13/2024) 30 tablet 0      Allergies:   Allergies   Allergen Reactions    Adhesive Tape OTHER (SEE COMMENTS)     Caused redness after being on for a length of time    Ace Inhibitors Coughing      Past Medical History:    Arthritis    Knees bone on bone    Back problem    Cancer (HCC)    SKIN CANCER    Easy bruising    ASA 81 mg daily    Glomerulonephritis    Hemorrhoids    Pregnancy    High blood pressure    History of COVID-19    fatigue. not hospitalized. no continued symptoms    Kidney failure    Transplant 4/25/02    Osteoarthritis    Other and unspecified hyperlipidemia    Pain in joints    Renal disorder    kidney transplant    Type II or unspecified type diabetes mellitus without mention of complication, not stated as uncontrolled    Unspecified essential hypertension    Visual impairment    glasses / contacts    Wears glasses      Past Surgical History:   Procedure Laterality Date    Colonoscopy  2013, 2017    Organ transplant  2002    Kidney    Other surgical history  age 42    Renal transplant    Tonsillectomy  1964    Transplantation of kidney      4/25/02 at [facility]      Family History   Problem Relation Age of Onset    Other (Other) Mother         alzheimers    Diabetes Mother         Type 1    Other Mother         Parkinsons    Other (Other) Self 54        basal skin cancer      Social History:   Social History     Socioeconomic History    Marital status:    Tobacco Use    Smoking status: Never    Smokeless tobacco: Never   Vaping Use    Vaping status: Never Used   Substance and Sexual Activity    Alcohol use: No    Drug use: No   Other Topics Concern    Caffeine  Concern Yes    Exercise No     Social Determinants of Health     Food Insecurity: No Food Insecurity (10/16/2023)    Food Insecurity     Food Insecurity: Never true   Transportation Needs: No Transportation Needs (10/16/2023)    Transportation Needs     Lack of Transportation: No    [facility]    Social Connections   Housing Stability: Low Risk  (10/16/2023)    Housing Stability     Housing Instability: No           REVIEW OF SYSTEMS:   GENERAL: feels well otherwise  SKIN: denies any unusual skin lesions  EYES:denies blurred vision or double vision  HEENT: denies nasal congestion, sinus pain or ST  LUNGS: denies shortness of breath with exertion  CARDIOVASCULAR: denies chest pain on exertion  GI: denies abdominal pain,denies heartburn  : denies dysuria, vaginal discharge or itching,periods regular denies nocturia or changes in stream  MUSCULOSKELETAL: low back pain with right sided radiculopathy , paresthesia of the right foot  NEURO: denies headaches  PSYCHE: denies depression or anxiety  HEMATOLOGIC: denies hx of anemia  ENDOCRINE: denies thyroid history  ALL/ASTHMA: denies hx of allergy or asthma    EXAM:   There were no vitals taken for this visit.  GENERAL: well developed, well nourished,in no apparent distress  SKIN: no rashes,no suspicious lesions  HEENT: atraumatic, normocephalic,ears and throat are clear  EYES:PERRLA, EOMI, normal optic disk,conjunctiva are clear  NECK: supple,no adenopathy,no bruits  CHEST: no chest tenderness  LUNGS: clear to auscultation  CARDIO: RRR without murmur  GI: good BS's,no masses, HSM or tenderness  MUSCULOSKELETAL: back is not tender,FROM of the back  EXTREMITIES: no cyanosis, clubbing or edema  NEURO: Oriented times three,cranial nerves are intact,motor and sensory are grossly intact    ASSESSMENT AND PLAN:     Encounter Diagnoses   Name Primary?    Preop examination Yes    Spondylolisthesis of lumbar region      Sciatica, right side     Type 2 diabetes mellitus without complication, without long-term current use of insulin (HCC)     Primary hypertension     Renal transplant recipient ([facility])    REVIEWED HER LABS AND THEY APPEAR TO BE ACCEPTABLE, EKG  SHOWED NSR , NO NEW OR ACUTE CHANGES  No new RX meds,  can stay on her prednisone and cyclosporine   Brinda Max is a 64 year old female who presents for a pre-operative physical exam. Patient is to haveL4-S1 ALIF, L4-1 percutaneous screw placement on 7/11/24 with Dr Hdez at [facility],     Pt has no significant history of cardiac or pulmonary conditions. Pt is a good surgical candidate. This consult was sent back the referring physician, Dr. Stark . Lemuel  MEDICALLY CLEAR FOR SURGERY

## 2024-06-26 NOTE — TELEPHONE ENCOUNTER
Re-faxed Detailed Written Order and Letter of Medical Necessity for LSO brace from [facility] with Dr Hdez's signature.    Received fax confirmation     Paperwork sent to scan.

## 2024-06-27 ENCOUNTER — NURSE ONLY (OUTPATIENT)
Dept: SURGERY | Facility: CLINIC | Age: 64
End: 2024-06-27

## 2024-06-27 ENCOUNTER — TELEPHONE (OUTPATIENT)
Dept: FAMILY MEDICINE CLINIC | Facility: CLINIC | Age: 64
End: 2024-06-27

## 2024-06-27 RX ORDER — AMOXICILLIN 500 MG/1
500 CAPSULE ORAL 3 TIMES DAILY
Qty: 15 CAPSULE | Refills: 0 | Status: SHIPPED | OUTPATIENT
Start: 2024-06-27 | End: 2024-10-10

## 2024-06-27 NOTE — TELEPHONE ENCOUNTER
PLEASE NOTIFY THE BACTERIA IN HER URINE IS MORE SUSCEPTIBLE TO AMOXICILLIN THAN THE LEVAQUIN, SHE CAN STOP THE LEVAQUIN, I SENT  MG OF AMOXICILLIN THREE TIMES DAILY FOR 5 DAYS, MAYBE ONCE SHE'S DONE LETS GET ANOTHER URINE

## 2024-06-27 NOTE — PROGRESS NOTES
RN Spine Navigator Education for Brinda Max     If you have received instructions from your surgeon that are different from those listed below, please follow your physician's instructions.    You are scheduled for a Lumbar fusion with Dr. Hdez on 7/11/24.      Patient Surgical Goals: Improvement of pain. She would like to be able to walk normally without a limp.    Before Your Surgery    Choose a Care Partner  Patient attended spine navigator visit independently.  Care partner is  Jeff. Your care partner(s) should be able to provide transportation to and from the hospital. They should be able to help at home for the first week after discharge, including helping you with meals, medication, and dressing changes.    Clearance Before Surgery  You will need to see your primary care provider within 30 days before surgery. Please make sure this appointment is at least a week before surgery as more testing or doctor visits may be ordered. Presurgical testing may include labs, nasal swab, imaging, EKG.   Make sure that you complete all preadmission testing so that surgery does not get delayed.    Home Environment  Assessed home status: bedroom and bathroom are on first floor and patient has pets. Suggested arrangements for pet care and help at home.  It is recommended that you prepare your home by putting clean sheets on your bed, freezing meals, and putting frequently used items at waist level.   Prevent falls by removing items that could cause you to trip, adding nightlights and adding a nonskid mat in shower.   Assistive devices can be purchased at a medical supply store or online including canes, walkers, toileting devices (commodes, raised toilet seats, toilet paper wiping aid), long handled sponge, shower chair or tub transfer bench, grabber/reacher tool, sock aid, long handled shoehorn, if needed.      Tobacco, Nicotine and Marijuana Use   Not applicable    Medications to Stop   For 7-10 days before surgery do  not take any blood thinning medications. This includes non-steroidal anti-inflammatories or NSAIDs (Advil, Aleve, Motrin, ibuprofen, naproxen, meloxicam, diclofenac, celecoxib, etc.), aspirin (unless told otherwise by your cardiologist or surgeon), herbal supplements and vitamins (garlic, turmeric, vitamin E, fish oil or krill oil, etc.). You may only take Tylenol or prescribed narcotic medication if needed for pain.   Other medications to stop include: none and anticoagulants/antiplatelet per surgeon or prescribing PCP order    Leading Up to Day of Surgery  Five days before surgery wash with Hibiclens soap after your regular body soap every day. Do not put use Hibiclens on your face, hair or privates. Your last shower should be the night before surgery.  One-two business days before surgery, the preadmission testing staff will call you and let you know what time to arrive, where to park, when to take your Tylenol and Gatorade, and will provide any additional instructions.   After 11pm the day before surgery, do not eat or drink anything (including water, gum, or candies) except for Tylenol and Gatorade.   Drink 12 ounces of regular Gatorade (NOT RED) 12 hours prior to your scheduled surgery time. Drink your second 12 ounces of regular Gatorade and take 1000 mg of Tylenol (acetaminophen) 4 hours before your scheduled surgery time.     Items to Bring to the Hospital   Bring insurance card, ID, advanced directive, or medical power of  paperwork, loose fitting clothing, shoes with a back that can accommodate swollen feet, long phone charging cord, glasses and brace.  Do not bring jewelry, valuables, or medications.   If you take an uncommon medication that the hospital may not have, it must be brought to the hospital in the original container, and you must notify the nurse of this medication.     In the Hospital     Operative Day and Hospital Stay  In the preoperative area, you will change into a gown, have an  IV placed in your hand or arm by the nurse, and sign any consent paperwork that is needed for your procedure. You will speak to the surgeon and anesthesiologist. It is important to tell the doctors and nurses if you have had any significant side effects from pain medications or anesthesia such as a rash or severe nausea.    In the operating room, the anesthesiologist will attach monitors, give you oxygen through a mask, and give you medicine through the IV to fall asleep. After you are sleeping, the breathing tube will be placed. The surgeon may use additional nerve monitoring during your surgery. This is to make sure that the muscles and nerves in your arms and legs are working normally as he operates. The equipment will be hooked up and removed while you are asleep. You will wake up on the hospital bed.     During the surgery, your care partner can sit in the surgical waiting area. There are TV screens in that area that keep them informed of your progress. If the procedure is at [facility], there is a person who can speak to them about your surgical progress.     In the recovery room, monitors will be attached that check your heart rate, blood pressure and oxygen levels. While you may not remember this part, a nurse is with you and constantly checking on your condition. Medications for pain and nausea will be given if you need them. You may have a hooper catheter to empty your bladder or a drain at your surgical site. Your family is not allowed in the recovery room. When you are ready to leave the recovery room, you will be transported on your bed to the inpatient unit accompanied by your family once a room is available.  On the inpatient unit, a team of doctors and advanced practice providers will manage your care. The spine care nurses will check your blood pressure, temperature, heartbeat, breathing, stomach sounds and your incision. They may assess the strength and sensation in your arms and legs. Medications that  are given in the hospital include antibiotics, IV fluids, pain medications, muscle relaxers, stool softeners, and your home medications. You may get blood drawn and another x-ray. Physical and occupational therapists may come to your room to teach you how to move around safely after surgery.     Post Op Plan   The average length of hospital stay is one to three days. A  may visit you to help arrange extra care for you once you leave the hospital. Occasionally, it is recommended that a home health nurse or therapist visit you in your home for a short time. The best place to recover is your own home, but if you need more assistance than home health and your care partner can provide, the  will help you and your family choose other facilities to help you recover your strength.    Preventing surgical complications  It is important to follow all instructions before and after surgery to decrease the risks of surgery and prevent complications.     Blood clots: walk, wear leg compression devices in the hospital, and do ankle pumps at home  Infection: wash with Hibiclens before surgery, wash your hands, don't touch your incision  Pneumonia: take deep breaths and cough and use the breathing exercise (incentive spirometer)   Nausea/vomiting: start with liquids and small meals and do not take pills on an empty stomach  Constipation: drink water and walk frequently    Tell your nurse if you are experiencing nausea, vomiting or constipation as they have medications to help treat these.      At home     Understanding Pain After Surgery  We will do our best to manage your pain after surgery, but it is not possible to be completely pain-free. There will be operative pain in your back or neck. Pain in the arms or legs may be one of the first things to improve. Numbness, weakness, and tingling should improve over time. However, there can be a temporary increase in symptoms in the first few days due to  inflammation from surgery.   Pain medications will be prescribed to take home at discharge. The goal of pain medicine after surgery is to make your pain tolerable, not to make you pain free. We encourage you to use the medication prescribed to you after your surgery, but please take the lowest possible dose to manage your pain. Taking high doses of narcotics can cause side effects. Transition to plain Tylenol when your pain improves. At [facility], your prescriptions can be filled by our pharmacy and brought to you bedside. You may get more continuous pain relief by alternating between medications if you have multiple instead of taking them at the same time. Write down when you have taken a medication as it may be difficult to remember after a few doses.    Post operative medication   Tylenol (acetaminophen): take for pain. Do not take more than 3000 mg - 4000 mg in 24 hours because it can damage your liver.   Narcotics: take for moderate to severe pain. Do not drink alcohol or drive while taking narcotics. Some narcotic medications (Norco, Tylenol #3, Percocet, Vicodin) contain Tylenol (acetaminophen). Make sure to not exceed the maximum dose if you are taking additional Tylenol with these medications.  Muscle relaxers: take for muscle cramping. These can cause drowsiness.    NSAIDS (Advil, Aleve, Motrin, ibuprofen, naproxen, meloxicam, diclofenac, celecoxib, etc.) or aspirin: Do not take these unless your physician says it is ok. For a fusion, it may be several months before you can take NSAIDS.  Stool softeners: take to prevent constipation while you are taking narcotic medications. You can get these over the counter at the pharmacy. You may use laxatives, which are stronger, if needed.    If you believe you will need more of medication your surgeon has prescribed to you, request a refill through your pharmacy or through the refill request in [employer] (log in, go to medications, then select refill request) at  least two business days before you run out of medication.     Nonpharmacologic pain management   You may use ice on your incision for 20 minutes every hour to help with pain and swelling. Do not place ice directly on your skin. You can use heat to sooth your muscles but avoid placing heat directly on your incision. Make frequent position changes. You can do gentle stretching while avoiding significant bending or twisting. Use deep breathing techniques and distractions such as TV, music, reading, or games.     Movement restrictions  After surgery, no bending or twisting your neck or back. Do not lift anything over 10lbs (about the weight of a gallon of milk) or lift anything over your shoulders. Avoid pulling or pushing. You may use stairs while holding the handrail.  It is ok to ride in the car but refrain from driving or traveling long distances until cleared to do so by your surgeon. You may not drive while taking narcotics or muscle relaxants. If you had a fracture or fusion surgery, your doctor may give you a brace. Braces are worn for comfort, when up and moving around, or constantly depending on your doctor's order. Wear your brace as instructed.     Post Op Exercise   Walk frequently. Start with walking short distances and increase as you start to feel better. Do ankle pumps (bending at your ankles, bring your feet towards your head then point them towards the ground) 15-20 times every hour when awake to help prevent blood clots.     Your surgeon will let you know at your post operative appointment if you are ready to decrease your movement restrictions and increase your exercise. If you have questions in between appointments about lessening your restrictions, please contact the office.     You and your doctor will discuss how you are feeling as you heal and decide if outpatient physical therapy or a medical fitness referral is needed.    Caring for your incision  Always wash your hands before touching your  incision. Your incision will be closed with sutures under the skin and skin glue or Steri-Strips (thin white bandages) on top of the skin. Do not attempt to peal off Skin glue/Steri-Strips as they will come off on their own. If the incision is closed with sutures or staples on top of the skin, they will be removed at a post op appointment. The incision may be covered with a gauze dressing that can come off after three days. Once the original gauze dressing is removed, we prefer that you leave your incision open to air (without a gauze dressing). If the incision has drainage or is rubbing against your clothes or brace, you may place gauze and medical tape over it. Change the gauze and medical tape daily. Look at your incision daily to check for signs of infection.   You can shower three days after your surgery or sooner if your surgeon allows. We recommend the care partner be present during the first shower for safety. Let soapy water run over the incision, but do not scrub it or spray it directly. Gently pat it dry after with a clean towel. Do not apply any creams or lotions to the incision. Do not soak in a tub, pool, or any body of water until your incision is fully healed.    Signs of Infection   Check your incision daily for swelling, redness, drainage, pus, bad smell, or opening of the incision.     When to Call for Assistance  Call the Neurosurgery Office ([phone] Option #2) if you experience signs of infection, opening of the incision, continuous nausea or vomiting, poor pain control despite using the pain medication as directed, a sudden increase in pain, temperature over 101F, difficulty swallowing, leg swelling, or with any concerns, unanswered questions, or new problems, such as new numbness/weakness/tingling.  Call 911 or go to the nearest emergency room if you experience chest pain, difficulty breathing, loss of bowel or bladder control, extreme drowsiness, or any other life-threatening situation.      Follow-up Plan   Appointments with Dr. Hdez or Roxana at 1, 4 and 12 weeks    Answered questions regarding: No questions     You can contact the RN Spine Navigator at [phone] or [email or web address] with additional questions or feedback on your care. It may take several business days to receive a reply so please do not call the RN spine navigator for refills or for emergencies.    Spine navigator spent 40 minutes conducting a virtual visit to provide education. Thank you for letting the RN Spine Navigator participate in your care.

## 2024-06-28 ENCOUNTER — TELEPHONE (OUTPATIENT)
Dept: SURGERY | Facility: CLINIC | Age: 64
End: 2024-06-28

## 2024-06-28 NOTE — TELEPHONE ENCOUNTER
U/A showing +Group B strep. I would advise patient contact her PCP for treatment of this prior to surgery.     Please contact or send message to patient's PCP to relay the above.

## 2024-06-28 NOTE — TELEPHONE ENCOUNTER
This has already been addressed by Dr. Huber.  He has prescribed medication and pt has been contacted by his office

## 2024-07-03 ENCOUNTER — OFFICE VISIT (OUTPATIENT)
Dept: SURGERY | Facility: CLINIC | Age: 64
End: 2024-07-03
Payer: COMMERCIAL

## 2024-07-03 VITALS — SYSTOLIC BLOOD PRESSURE: 122 MMHG | DIASTOLIC BLOOD PRESSURE: 70 MMHG | HEART RATE: 100 BPM

## 2024-07-03 DIAGNOSIS — M54.16 LUMBAR BACK PAIN WITH RADICULOPATHY AFFECTING RIGHT LOWER EXTREMITY: ICD-10-CM

## 2024-07-03 DIAGNOSIS — M47.816 LUMBAR SPONDYLOSIS: Primary | ICD-10-CM

## 2024-07-03 NOTE — PROGRESS NOTES
[facility]  Neurosurgery Clinic Visit   24     Brinda Max PCP:  Curtis Huber DO    1960   MRN [id number]       Reason for Visit: Pre-Op Discussion    HPI     Brinda Max  is a very pleasant 64 year old female with lumbar radiculopathy who presents for pre-op discussion.  She presents today with her .  Patient continues to report numbness tingling to BLE, right greater than left.  She has failed PT, [place], and medication therapy with no relief of her symptoms. She is planned to undergo L4-S1 ALID with percutaneous screw fixation by Dr. Hdez on 2024. The risks and benefits of surgery were discussed, including recovery.     Pre-- op labs have been reviewed.  Patient noted to have a positive urinalysis and has completed a course of amoxicillin as prescribed by her PCP.    HISTORY     Past Medical History:    Arthritis    Knees bone on bone    Back problem    Cancer (HCC)    SKIN CANCER    Easy bruising    ASA 81 mg daily    Glomerulonephritis    Hemorrhoids    Pregnancy    High blood pressure    History of COVID-19    fatigue. not hospitalized. no continued symptoms    Kidney failure    Transplant 02    Osteoarthritis    Other and unspecified hyperlipidemia    Pain in joints    Renal disorder    kidney transplant    Type II or unspecified type diabetes mellitus without mention of complication, not stated as uncontrolled    Unspecified essential hypertension    Visual impairment    glasses / contacts    Wears glasses      Past Surgical History:   Procedure Laterality Date    Colonoscopy  ,     Organ transplant  2002    Kidney    Other surgical history  age 42    Renal transplant    Tonsillectomy  1964    Transplantation of kidney      02 at [place]      Family History   Problem Relation Age of Onset    Other (Other) Mother         alzheimers    Diabetes Mother         Type 1    Other Mother         Parkinsons    Other (Other) Self 54         basal skin cancer      Social History     Socioeconomic History    Marital status:    Tobacco Use    Smoking status: Never    Smokeless tobacco: Never   Vaping Use    Vaping status: Never Used   Substance and Sexual Activity    Alcohol use: No    Drug use: No   Other Topics Concern    Caffeine Concern Yes    Exercise No      Allergies   Allergen Reactions    Adhesive Tape OTHER (SEE COMMENTS)     Caused redness after being on for a length of time    Ace Inhibitors Coughing        CURRENT MEDICATIONS     Current Outpatient Medications   Medication Sig Dispense Refill    ATORVASTATIN 20 MG Oral Tab TAKE 1 TABLET BY MOUTH DAILY 100 tablet 2    losartan 50 MG Oral Tab Take 1 tablet (50 mg total) by mouth daily. 100 tablet 2    cycloSPORINE non-modified 25 MG Oral Cap TAKE 3 CAPSULES BY MOUTH EVERY  MORNING AND 2 CAPSULES EVERY  EVENING 450 capsule 3    predniSONE 5 MG Oral Tab Take 1 tablet (5 mg total) by mouth daily. 90 tablet 1    Mycophenolate Mofetil 500 MG Oral Tab Take 2 tablets (1,000 mg total) by mouth 2 (two) times daily. 360 tablet 3    Glucose Blood In Vitro Strip 1 each by Other route daily. 90 each 3    magnesium oxide 400 MG Oral Tab Take 1 tablet (400 mg total) by mouth 2 (two) times daily.      multivitamin Oral Tab Take 1 tablet by mouth daily.      Cinnamon 500 MG Oral Cap Take 1,000 mg by mouth 2 (two) times daily.      Cholecalciferol (VITAMIN D) 1000 UNITS Oral Cap Take 1 capsule by mouth 2 (two) times daily.      diazePAM 10 MG Oral Tab Take 0.5 tablets (5 mg total) by mouth nightly as needed. (Patient not taking: Reported on 7/3/2024) 30 tablet 0    traMADol 50 MG Oral Tab Take 1 tablet (50 mg total) by mouth every 6 (six) hours as needed for Pain. (Patient not taking: Reported on 7/3/2024) 30 tablet 0    ASPIRIN LOW DOSE 81 MG Oral Tab EC Take 1 tablet (81 mg total) by mouth daily. (Patient not taking: Reported on 7/3/2024)      Omega 3 1000 MG Oral Cap Take 2,000 mg by mouth 2 (two)  times daily.   (Patient not taking: Reported on 7/3/2024)          REVIEW OF SYSTEMS     Comprehensive review of systems done. Negative except what is outlined in the above HPI.     PHYSICAL EXAMIMATION     VITALS:  /70 (BP Location: Right arm, Patient Position: Sitting, Cuff Size: large)   Pulse 100     GENERAL: No apparent distress, non-toxic appearing. Sitting comfortably in the examination chair.     MUSCULOSKELETAL: Even tone. Moving bilateral upper and lower extremities spontaneously and against resistance.    NEURO: Awake, alert and oriented x3. Gait intact, non-antalgic. Sensation to light touch on bilateral upper and lower extremities intact. Strength 5/5 B/L    MEDICAL DECISION MAKING:     ASSESSMENT:  1. Lumbar spondylosis    2. Lumbar back pain with radiculopathy affecting right lower extremity      Discussed in detail the risks and benefits of surgery. Possible complications include but are not limited to: death, paralysis, sensory loss, bowel and/or bladder incontinence, infection, hemorrhage, cerebral spinal fluid leak, bone nonfusion/instrumentation failure, the rare complication of blindness and finally no benefit from surgery. All questions have been answered to satisfaction at this time and the patient wishes to proceed.     PLAN:   Proceed with L4-S1 ALIF with posterior perc fixation  Medical clearance from PCP is noted    Total visit time: 30 minutes; More than 50% spent coordinating care, counseling, reviewing imaging and discussing medication therapy.     Roxana Mo, MSN, APRN, FNP-[facility]  Neurosurgery   [address] [place] [address], [address]  [place] [address]  [phone]

## 2024-07-03 NOTE — PATIENT INSTRUCTIONS
Refill policies:    Allow 2-3 business days for refills; controlled substances may take longer.  Contact your pharmacy at least 5 days prior to running out of medication and have them send an electronic request or submit request through the “request refill” option in your [employer] account.  Refills are not addressed on weekends; covering physicians do not authorize routine medications on weekends.  No narcotics or controlled substances are refilled after noon on Fridays or by on call physicians.  By law, narcotics must be electronically prescribed.  A 30 day supply with no refills is the maximum allowed.  If your prescription is due for a refill, you may be due for a follow up appointment.  To best provide you care, patients receiving routine medications need to be seen at least once a year.  Patients receiving narcotic/controlled substance medications need to be seen at least once every 3 months.  In the event that your preferred pharmacy does not have the requested medication in stock (e.g. Backordered), it is your responsibility to find another pharmacy that has the requested medication available.  We will gladly send a new prescription to that pharmacy at your request.    Scheduling Tests:    If your physician has ordered radiology tests such as MRI or CT scans, please contact Central Scheduling at [phone] right away to schedule the test.  Once scheduled, the [facility] Centralized Referral Team will work with your insurance carrier to obtain pre-certification or prior authorization.  Depending on your insurance carrier, approval may take 3-10 days.  It is highly recommended patients assure they have received an authorization before having a test performed.  If test is done without insurance authorization, patient may be responsible for the entire amount billed.      Precertification and Prior Authorizations:  If your physician has recommended that you have a procedure or additional testing performed the [facility]  Centralized Referral Team will contact your insurance carrier to obtain pre-certification or prior authorization.    You are strongly encouraged to contact your insurance carrier to verify that your procedure/test has been approved and is a COVERED benefit.  Although the [facility] Centralized Referral Team does its due diligence, the insurance carrier gives the disclaimer that \"Although the procedure is authorized, this does not guarantee payment.\"    Ultimately the patient is responsible for payment.   Thank you for your understanding in this matter.  Paperwork Completion:  If you require FMLA or disability paperwork for your recovery, please make sure to either drop it off or have it faxed to our office at [phone]. Be sure the form has your name and date of birth on it.  The form will be faxed to our Forms Department and they will complete it for you.  There is a 25$ fee for all forms that need to be filled out.  Please be aware there is a 10-14 day turnaround time.  You will need to sign a release of information (CHANDRA) form if your paperwork does not come with one.  You may call the Forms Department with any questions at [phone].  Their fax number is [phone].

## 2024-07-08 ENCOUNTER — PATIENT MESSAGE (OUTPATIENT)
Dept: SURGERY | Facility: CLINIC | Age: 64
End: 2024-07-08

## 2024-07-08 NOTE — TELEPHONE ENCOUNTER
Noted that patient has messaged and is asking if [employer] stimulator rep is legitimate.      Messaged patient informing her that LACEY did place an order for bone growth stimulator:    \"Comments:  Referred to [employer]  Dispense external bone growth stimulator-Lumbar  Fax #: [phone]  Phone #: [phone]  Patient was prescribed a Lumbar - Bone growth stimulator following Spinal Fusion:  Fusion Surgery Date: 7/11/24 Surgery Levels: L4-S1\"

## 2024-07-08 NOTE — TELEPHONE ENCOUNTER
From: Brinda Max  To: Roxana Mo  Sent: 7/8/2024 2:48 PM CDT  Subject: Spinal surgery     Received a call today from someone who wants to come to my house and deliver a bone growth stimulator. No one has mentioned this to me before and I wondered if it is legitimate.

## 2024-07-11 ENCOUNTER — ANESTHESIA (OUTPATIENT)
Dept: SURGERY | Facility: HOSPITAL | Age: 64
End: 2024-07-11
Payer: MEDICARE

## 2024-07-11 ENCOUNTER — HOSPITAL ENCOUNTER (INPATIENT)
Facility: HOSPITAL | Age: 64
LOS: 1 days | Discharge: HOME | End: 2024-07-12
Attending: NEUROLOGICAL SURGERY | Admitting: NEUROLOGICAL SURGERY
Payer: MEDICARE

## 2024-07-11 ENCOUNTER — APPOINTMENT (OUTPATIENT)
Dept: GENERAL RADIOLOGY | Facility: HOSPITAL | Age: 64
End: 2024-07-11
Attending: NEUROLOGICAL SURGERY
Payer: MEDICARE

## 2024-07-11 ENCOUNTER — ANESTHESIA EVENT (OUTPATIENT)
Dept: SURGERY | Facility: HOSPITAL | Age: 64
End: 2024-07-11
Payer: MEDICARE

## 2024-07-11 DIAGNOSIS — Z98.1 S/P LUMBAR FUSION: Primary | ICD-10-CM

## 2024-07-11 DIAGNOSIS — Z01.818 PRE-OP TESTING: ICD-10-CM

## 2024-07-11 LAB
GLUCOSE BLD-MCNC: 103 MG/DL (ref 70–99)
GLUCOSE BLD-MCNC: 180 MG/DL (ref 70–99)

## 2024-07-11 PROCEDURE — 0TN70ZZ RELEASE LEFT URETER, OPEN APPROACH: ICD-10-PCS | Performed by: SURGERY

## 2024-07-11 PROCEDURE — 82962 GLUCOSE BLOOD TEST: CPT

## 2024-07-11 PROCEDURE — 0SG30A0 FUSION OF LUMBOSACRAL JOINT WITH INTERBODY FUSION DEVICE, ANTERIOR APPROACH, ANTERIOR COLUMN, OPEN APPROACH: ICD-10-PCS | Performed by: NEUROLOGICAL SURGERY

## 2024-07-11 PROCEDURE — 0SG00A0 FUSION OF LUMBAR VERTEBRAL JOINT WITH INTERBODY FUSION DEVICE, ANTERIOR APPROACH, ANTERIOR COLUMN, OPEN APPROACH: ICD-10-PCS | Performed by: NEUROLOGICAL SURGERY

## 2024-07-11 PROCEDURE — 0SB20ZZ EXCISION OF LUMBAR VERTEBRAL DISC, OPEN APPROACH: ICD-10-PCS | Performed by: NEUROLOGICAL SURGERY

## 2024-07-11 PROCEDURE — 0SB40ZZ EXCISION OF LUMBOSACRAL DISC, OPEN APPROACH: ICD-10-PCS | Performed by: NEUROLOGICAL SURGERY

## 2024-07-11 PROCEDURE — 76000 FLUOROSCOPY <1 HR PHYS/QHP: CPT | Performed by: NEUROLOGICAL SURGERY

## 2024-07-11 PROCEDURE — 4A11X4G MONITORING OF PERIPHERAL NERVOUS ELECTRICAL ACTIVITY, INTRAOPERATIVE, EXTERNAL APPROACH: ICD-10-PCS | Performed by: NEUROLOGICAL SURGERY

## 2024-07-11 DEVICE — SCREW SPNL 6.5X45MM CANN POLYAX EXT TAB: Type: IMPLANTABLE DEVICE | Site: BACK | Status: FUNCTIONAL

## 2024-07-11 DEVICE — SCREW
Type: IMPLANTABLE DEVICE | Site: BACK | Status: FUNCTIONAL
Brand: MONTEREY AL

## 2024-07-11 DEVICE — SET SCR SPNL POLYAX ATR EVEREST: Type: IMPLANTABLE DEVICE | Site: BACK | Status: FUNCTIONAL

## 2024-07-11 DEVICE — MAGNETOS EASYPACK PUTTY 10CC 1-2MM USA
Type: IMPLANTABLE DEVICE | Site: BACK | Status: FUNCTIONAL
Brand: MAGNETOS

## 2024-07-11 DEVICE — IMPLANTABLE DEVICE: Type: IMPLANTABLE DEVICE | Site: BACK | Status: FUNCTIONAL

## 2024-07-11 DEVICE — ANTERIOR LUMBAR CAGE
Type: IMPLANTABLE DEVICE | Site: BACK | Status: FUNCTIONAL
Brand: MONTEREY AL

## 2024-07-11 RX ORDER — BUPIVACAINE HYDROCHLORIDE 5 MG/ML
INJECTION, SOLUTION EPIDURAL; INTRACAUDAL AS NEEDED
Status: DISCONTINUED | OUTPATIENT
Start: 2024-07-11 | End: 2024-07-11 | Stop reason: HOSPADM

## 2024-07-11 RX ORDER — HYDROMORPHONE HYDROCHLORIDE 1 MG/ML
0.2 INJECTION, SOLUTION INTRAMUSCULAR; INTRAVENOUS; SUBCUTANEOUS EVERY 5 MIN PRN
Status: DISCONTINUED | OUTPATIENT
Start: 2024-07-11 | End: 2024-07-11 | Stop reason: HOSPADM

## 2024-07-11 RX ORDER — DEXTROSE MONOHYDRATE 25 G/50ML
50 INJECTION, SOLUTION INTRAVENOUS
Status: DISCONTINUED | OUTPATIENT
Start: 2024-07-11 | End: 2024-07-11 | Stop reason: HOSPADM

## 2024-07-11 RX ORDER — NEOSTIGMINE METHYLSULFATE 1 MG/ML
INJECTION, SOLUTION INTRAVENOUS AS NEEDED
Status: DISCONTINUED | OUTPATIENT
Start: 2024-07-11 | End: 2024-07-11 | Stop reason: SURG

## 2024-07-11 RX ORDER — NALOXONE HYDROCHLORIDE 0.4 MG/ML
0.08 INJECTION, SOLUTION INTRAMUSCULAR; INTRAVENOUS; SUBCUTANEOUS
Status: DISCONTINUED | OUTPATIENT
Start: 2024-07-11 | End: 2024-07-12

## 2024-07-11 RX ORDER — SODIUM CHLORIDE 9 MG/ML
INJECTION, SOLUTION INTRAVENOUS CONTINUOUS PRN
Status: DISCONTINUED | OUTPATIENT
Start: 2024-07-11 | End: 2024-07-11 | Stop reason: SURG

## 2024-07-11 RX ORDER — CYCLOSPORINE 25 MG/1
2 CAPSULE, GELATIN COATED ORAL EVERY EVENING
COMMUNITY

## 2024-07-11 RX ORDER — OMEGA-3 FATTY ACIDS/FISH OIL 300-1000MG
2000 CAPSULE ORAL 2 TIMES DAILY
Status: DISCONTINUED | OUTPATIENT
Start: 2024-07-11 | End: 2024-07-11 | Stop reason: RX

## 2024-07-11 RX ORDER — ACETAMINOPHEN 500 MG
1000 TABLET ORAL ONCE
Status: DISCONTINUED | OUTPATIENT
Start: 2024-07-11 | End: 2024-07-11 | Stop reason: HOSPADM

## 2024-07-11 RX ORDER — MIDAZOLAM HYDROCHLORIDE 1 MG/ML
1 INJECTION INTRAMUSCULAR; INTRAVENOUS EVERY 5 MIN PRN
Status: DISCONTINUED | OUTPATIENT
Start: 2024-07-11 | End: 2024-07-11 | Stop reason: HOSPADM

## 2024-07-11 RX ORDER — KETOROLAC TROMETHAMINE 30 MG/ML
15 INJECTION, SOLUTION INTRAMUSCULAR; INTRAVENOUS EVERY 6 HOURS PRN
Status: DISCONTINUED | OUTPATIENT
Start: 2024-07-11 | End: 2024-07-12

## 2024-07-11 RX ORDER — NALOXONE HYDROCHLORIDE 0.4 MG/ML
80 INJECTION, SOLUTION INTRAMUSCULAR; INTRAVENOUS; SUBCUTANEOUS AS NEEDED
Status: DISCONTINUED | OUTPATIENT
Start: 2024-07-11 | End: 2024-07-11 | Stop reason: HOSPADM

## 2024-07-11 RX ORDER — ONDANSETRON 2 MG/ML
4 INJECTION INTRAMUSCULAR; INTRAVENOUS EVERY 6 HOURS PRN
Status: DISCONTINUED | OUTPATIENT
Start: 2024-07-11 | End: 2024-07-12

## 2024-07-11 RX ORDER — DEXAMETHASONE SODIUM PHOSPHATE 4 MG/ML
VIAL (ML) INJECTION AS NEEDED
Status: DISCONTINUED | OUTPATIENT
Start: 2024-07-11 | End: 2024-07-11 | Stop reason: SURG

## 2024-07-11 RX ORDER — METHOCARBAMOL 100 MG/ML
INJECTION, SOLUTION INTRAMUSCULAR; INTRAVENOUS
Status: COMPLETED
Start: 2024-07-11 | End: 2024-07-11

## 2024-07-11 RX ORDER — DIPHENHYDRAMINE HYDROCHLORIDE 50 MG/ML
12.5 INJECTION INTRAMUSCULAR; INTRAVENOUS EVERY 4 HOURS PRN
Status: DISCONTINUED | OUTPATIENT
Start: 2024-07-11 | End: 2024-07-12

## 2024-07-11 RX ORDER — CYCLOSPORINE 25 MG/1
50 CAPSULE, GELATIN COATED ORAL EVERY EVENING
Status: DISCONTINUED | OUTPATIENT
Start: 2024-07-11 | End: 2024-07-12

## 2024-07-11 RX ORDER — MEPERIDINE HYDROCHLORIDE 25 MG/ML
12.5 INJECTION INTRAMUSCULAR; INTRAVENOUS; SUBCUTANEOUS AS NEEDED
Status: DISCONTINUED | OUTPATIENT
Start: 2024-07-11 | End: 2024-07-11 | Stop reason: HOSPADM

## 2024-07-11 RX ORDER — DOXEPIN HYDROCHLORIDE 50 MG/1
1 CAPSULE ORAL DAILY
Status: DISCONTINUED | OUTPATIENT
Start: 2024-07-12 | End: 2024-07-12

## 2024-07-11 RX ORDER — HYDROMORPHONE HYDROCHLORIDE 1 MG/ML
0.4 INJECTION, SOLUTION INTRAMUSCULAR; INTRAVENOUS; SUBCUTANEOUS EVERY 5 MIN PRN
Status: DISCONTINUED | OUTPATIENT
Start: 2024-07-11 | End: 2024-07-11 | Stop reason: HOSPADM

## 2024-07-11 RX ORDER — ROCURONIUM BROMIDE 10 MG/ML
INJECTION, SOLUTION INTRAVENOUS AS NEEDED
Status: DISCONTINUED | OUTPATIENT
Start: 2024-07-11 | End: 2024-07-11 | Stop reason: SURG

## 2024-07-11 RX ORDER — SODIUM CHLORIDE, SODIUM LACTATE, POTASSIUM CHLORIDE, CALCIUM CHLORIDE 600; 310; 30; 20 MG/100ML; MG/100ML; MG/100ML; MG/100ML
INJECTION, SOLUTION INTRAVENOUS CONTINUOUS
Status: DISCONTINUED | OUTPATIENT
Start: 2024-07-11 | End: 2024-07-12

## 2024-07-11 RX ORDER — DEXAMETHASONE SODIUM PHOSPHATE 4 MG/ML
4 VIAL (ML) INJECTION EVERY 6 HOURS
Status: DISCONTINUED | OUTPATIENT
Start: 2024-07-11 | End: 2024-07-12

## 2024-07-11 RX ORDER — ATORVASTATIN CALCIUM 20 MG/1
20 TABLET, FILM COATED ORAL DAILY
Status: DISCONTINUED | OUTPATIENT
Start: 2024-07-11 | End: 2024-07-12

## 2024-07-11 RX ORDER — ENEMA 19; 7 G/133ML; G/133ML
1 ENEMA RECTAL ONCE AS NEEDED
Status: DISCONTINUED | OUTPATIENT
Start: 2024-07-11 | End: 2024-07-12

## 2024-07-11 RX ORDER — NICOTINE POLACRILEX 4 MG
15 LOZENGE BUCCAL
Status: DISCONTINUED | OUTPATIENT
Start: 2024-07-11 | End: 2024-07-11 | Stop reason: HOSPADM

## 2024-07-11 RX ORDER — HYDROMORPHONE HYDROCHLORIDE 1 MG/ML
INJECTION, SOLUTION INTRAMUSCULAR; INTRAVENOUS; SUBCUTANEOUS
Status: COMPLETED
Start: 2024-07-11 | End: 2024-07-11

## 2024-07-11 RX ORDER — CYCLOSPORINE 25 MG/1
75 CAPSULE, GELATIN COATED ORAL EVERY MORNING
Status: DISCONTINUED | OUTPATIENT
Start: 2024-07-12 | End: 2024-07-12

## 2024-07-11 RX ORDER — ACETAMINOPHEN 10 MG/ML
INJECTION, SOLUTION INTRAVENOUS
Status: COMPLETED
Start: 2024-07-11 | End: 2024-07-11

## 2024-07-11 RX ORDER — POLYETHYLENE GLYCOL 3350 17 G/17G
17 POWDER, FOR SOLUTION ORAL DAILY PRN
Status: DISCONTINUED | OUTPATIENT
Start: 2024-07-11 | End: 2024-07-12

## 2024-07-11 RX ORDER — SODIUM CHLORIDE 9 MG/ML
INJECTION, SOLUTION INTRAVENOUS CONTINUOUS
Status: DISCONTINUED | OUTPATIENT
Start: 2024-07-11 | End: 2024-07-12

## 2024-07-11 RX ORDER — EPHEDRINE SULFATE 50 MG/ML
INJECTION INTRAVENOUS AS NEEDED
Status: DISCONTINUED | OUTPATIENT
Start: 2024-07-11 | End: 2024-07-11 | Stop reason: SURG

## 2024-07-11 RX ORDER — LOSARTAN POTASSIUM 50 MG/1
50 TABLET ORAL DAILY
Status: DISCONTINUED | OUTPATIENT
Start: 2024-07-11 | End: 2024-07-12

## 2024-07-11 RX ORDER — MAGNESIUM OXIDE 400 MG/1
400 TABLET ORAL 2 TIMES DAILY
Status: DISCONTINUED | OUTPATIENT
Start: 2024-07-11 | End: 2024-07-12

## 2024-07-11 RX ORDER — MYCOPHENOLATE MOFETIL 250 MG/1
1000 CAPSULE ORAL 2 TIMES DAILY
Status: DISCONTINUED | OUTPATIENT
Start: 2024-07-11 | End: 2024-07-12

## 2024-07-11 RX ORDER — CYCLOSPORINE 25 MG/1
3 CAPSULE, GELATIN COATED ORAL EVERY MORNING
COMMUNITY

## 2024-07-11 RX ORDER — ONDANSETRON 2 MG/ML
INJECTION INTRAMUSCULAR; INTRAVENOUS AS NEEDED
Status: DISCONTINUED | OUTPATIENT
Start: 2024-07-11 | End: 2024-07-11 | Stop reason: SURG

## 2024-07-11 RX ORDER — SODIUM CHLORIDE, SODIUM LACTATE, POTASSIUM CHLORIDE, CALCIUM CHLORIDE 600; 310; 30; 20 MG/100ML; MG/100ML; MG/100ML; MG/100ML
INJECTION, SOLUTION INTRAVENOUS CONTINUOUS
Status: DISCONTINUED | OUTPATIENT
Start: 2024-07-11 | End: 2024-07-11 | Stop reason: HOSPADM

## 2024-07-11 RX ORDER — MELATONIN
1000 2 TIMES DAILY
Status: DISCONTINUED | OUTPATIENT
Start: 2024-07-11 | End: 2024-07-12

## 2024-07-11 RX ORDER — SENNOSIDES 8.6 MG
17.2 TABLET ORAL NIGHTLY
Status: DISCONTINUED | OUTPATIENT
Start: 2024-07-11 | End: 2024-07-12

## 2024-07-11 RX ORDER — DIPHENHYDRAMINE HYDROCHLORIDE 50 MG/ML
25 INJECTION INTRAMUSCULAR; INTRAVENOUS EVERY 4 HOURS PRN
Status: DISCONTINUED | OUTPATIENT
Start: 2024-07-11 | End: 2024-07-12

## 2024-07-11 RX ORDER — BISACODYL 10 MG
10 SUPPOSITORY, RECTAL RECTAL
Status: DISCONTINUED | OUTPATIENT
Start: 2024-07-11 | End: 2024-07-12

## 2024-07-11 RX ORDER — ONDANSETRON 2 MG/ML
4 INJECTION INTRAMUSCULAR; INTRAVENOUS EVERY 6 HOURS PRN
Status: DISCONTINUED | OUTPATIENT
Start: 2024-07-11 | End: 2024-07-11 | Stop reason: HOSPADM

## 2024-07-11 RX ORDER — DIAZEPAM 5 MG/1
5 TABLET ORAL EVERY 6 HOURS PRN
Status: DISCONTINUED | OUTPATIENT
Start: 2024-07-11 | End: 2024-07-12

## 2024-07-11 RX ORDER — LIDOCAINE HCL/EPINEPHRINE/PF 2%-1:200K
VIAL (ML) INJECTION AS NEEDED
Status: DISCONTINUED | OUTPATIENT
Start: 2024-07-11 | End: 2024-07-11 | Stop reason: HOSPADM

## 2024-07-11 RX ORDER — METHOCARBAMOL 100 MG/ML
750 INJECTION, SOLUTION INTRAMUSCULAR; INTRAVENOUS ONCE
Status: COMPLETED | OUTPATIENT
Start: 2024-07-11 | End: 2024-07-11

## 2024-07-11 RX ORDER — HYDROMORPHONE HYDROCHLORIDE 1 MG/ML
0.6 INJECTION, SOLUTION INTRAMUSCULAR; INTRAVENOUS; SUBCUTANEOUS EVERY 5 MIN PRN
Status: DISCONTINUED | OUTPATIENT
Start: 2024-07-11 | End: 2024-07-11 | Stop reason: HOSPADM

## 2024-07-11 RX ORDER — LABETALOL HYDROCHLORIDE 5 MG/ML
5 INJECTION, SOLUTION INTRAVENOUS EVERY 5 MIN PRN
Status: DISCONTINUED | OUTPATIENT
Start: 2024-07-11 | End: 2024-07-11 | Stop reason: HOSPADM

## 2024-07-11 RX ORDER — DIPHENHYDRAMINE HCL 25 MG
25 CAPSULE ORAL EVERY 4 HOURS PRN
Status: DISCONTINUED | OUTPATIENT
Start: 2024-07-11 | End: 2024-07-12

## 2024-07-11 RX ORDER — PHENYLEPHRINE HCL 10 MG/ML
VIAL (ML) INJECTION AS NEEDED
Status: DISCONTINUED | OUTPATIENT
Start: 2024-07-11 | End: 2024-07-11 | Stop reason: SURG

## 2024-07-11 RX ORDER — ACETAMINOPHEN 10 MG/ML
1000 INJECTION, SOLUTION INTRAVENOUS ONCE
Status: COMPLETED | OUTPATIENT
Start: 2024-07-11 | End: 2024-07-11

## 2024-07-11 RX ORDER — METOCLOPRAMIDE HYDROCHLORIDE 5 MG/ML
10 INJECTION INTRAMUSCULAR; INTRAVENOUS EVERY 8 HOURS PRN
Status: DISCONTINUED | OUTPATIENT
Start: 2024-07-11 | End: 2024-07-12

## 2024-07-11 RX ORDER — LIDOCAINE HYDROCHLORIDE 10 MG/ML
INJECTION, SOLUTION EPIDURAL; INFILTRATION; INTRACAUDAL; PERINEURAL AS NEEDED
Status: DISCONTINUED | OUTPATIENT
Start: 2024-07-11 | End: 2024-07-11 | Stop reason: SURG

## 2024-07-11 RX ORDER — NICOTINE POLACRILEX 4 MG
30 LOZENGE BUCCAL
Status: DISCONTINUED | OUTPATIENT
Start: 2024-07-11 | End: 2024-07-11 | Stop reason: HOSPADM

## 2024-07-11 RX ORDER — GLYCOPYRROLATE 0.2 MG/ML
INJECTION, SOLUTION INTRAMUSCULAR; INTRAVENOUS AS NEEDED
Status: DISCONTINUED | OUTPATIENT
Start: 2024-07-11 | End: 2024-07-11 | Stop reason: SURG

## 2024-07-11 RX ORDER — DOCUSATE SODIUM 100 MG/1
100 CAPSULE, LIQUID FILLED ORAL 2 TIMES DAILY
Status: DISCONTINUED | OUTPATIENT
Start: 2024-07-11 | End: 2024-07-12

## 2024-07-11 RX ORDER — ONDANSETRON 2 MG/ML
4 INJECTION INTRAMUSCULAR; INTRAVENOUS EVERY 6 HOURS PRN
Status: DISCONTINUED | OUTPATIENT
Start: 2024-07-11 | End: 2024-07-11

## 2024-07-11 RX ORDER — METOCLOPRAMIDE HYDROCHLORIDE 5 MG/ML
10 INJECTION INTRAMUSCULAR; INTRAVENOUS EVERY 8 HOURS PRN
Status: DISCONTINUED | OUTPATIENT
Start: 2024-07-11 | End: 2024-07-11 | Stop reason: HOSPADM

## 2024-07-11 RX ADMIN — ROCURONIUM BROMIDE 20 MG: 10 INJECTION, SOLUTION INTRAVENOUS at 13:50:00

## 2024-07-11 RX ADMIN — SODIUM CHLORIDE: 9 INJECTION, SOLUTION INTRAVENOUS at 16:59:00

## 2024-07-11 RX ADMIN — SODIUM CHLORIDE: 9 INJECTION, SOLUTION INTRAVENOUS at 11:30:00

## 2024-07-11 RX ADMIN — SODIUM CHLORIDE: 9 INJECTION, SOLUTION INTRAVENOUS at 13:01:00

## 2024-07-11 RX ADMIN — ROCURONIUM BROMIDE 20 MG: 10 INJECTION, SOLUTION INTRAVENOUS at 12:48:00

## 2024-07-11 RX ADMIN — PHENYLEPHRINE HCL 100 MCG: 10 MG/ML VIAL (ML) INJECTION at 15:52:00

## 2024-07-11 RX ADMIN — ONDANSETRON 4 MG: 2 INJECTION INTRAMUSCULAR; INTRAVENOUS at 16:58:00

## 2024-07-11 RX ADMIN — PHENYLEPHRINE HCL 100 MCG: 10 MG/ML VIAL (ML) INJECTION at 12:48:00

## 2024-07-11 RX ADMIN — DEXAMETHASONE SODIUM PHOSPHATE 8 MG: 4 MG/ML VIAL (ML) INJECTION at 11:18:00

## 2024-07-11 RX ADMIN — LIDOCAINE HYDROCHLORIDE 50 MG: 10 INJECTION, SOLUTION EPIDURAL; INFILTRATION; INTRACAUDAL; PERINEURAL at 11:20:00

## 2024-07-11 RX ADMIN — NEOSTIGMINE METHYLSULFATE 2 MG: 1 INJECTION, SOLUTION INTRAVENOUS at 16:54:00

## 2024-07-11 RX ADMIN — SODIUM CHLORIDE: 9 INJECTION, SOLUTION INTRAVENOUS at 15:32:00

## 2024-07-11 RX ADMIN — SODIUM CHLORIDE, SODIUM LACTATE, POTASSIUM CHLORIDE, CALCIUM CHLORIDE: 600; 310; 30; 20 INJECTION, SOLUTION INTRAVENOUS at 13:01:00

## 2024-07-11 RX ADMIN — GLYCOPYRROLATE 0.4 MG: 0.2 INJECTION, SOLUTION INTRAMUSCULAR; INTRAVENOUS at 16:54:00

## 2024-07-11 RX ADMIN — PHENYLEPHRINE HCL 100 MCG: 10 MG/ML VIAL (ML) INJECTION at 15:13:00

## 2024-07-11 RX ADMIN — PHENYLEPHRINE HCL 100 MCG: 10 MG/ML VIAL (ML) INJECTION at 12:22:00

## 2024-07-11 RX ADMIN — PHENYLEPHRINE HCL 100 MCG: 10 MG/ML VIAL (ML) INJECTION at 13:19:00

## 2024-07-11 RX ADMIN — ROCURONIUM BROMIDE 20 MG: 10 INJECTION, SOLUTION INTRAVENOUS at 11:56:00

## 2024-07-11 RX ADMIN — SODIUM CHLORIDE, SODIUM LACTATE, POTASSIUM CHLORIDE, CALCIUM CHLORIDE: 600; 310; 30; 20 INJECTION, SOLUTION INTRAVENOUS at 14:01:00

## 2024-07-11 RX ADMIN — SODIUM CHLORIDE, SODIUM LACTATE, POTASSIUM CHLORIDE, CALCIUM CHLORIDE: 600; 310; 30; 20 INJECTION, SOLUTION INTRAVENOUS at 15:32:00

## 2024-07-11 RX ADMIN — SODIUM CHLORIDE: 9 INJECTION, SOLUTION INTRAVENOUS at 14:00:00

## 2024-07-11 RX ADMIN — ROCURONIUM BROMIDE 50 MG: 10 INJECTION, SOLUTION INTRAVENOUS at 11:21:00

## 2024-07-11 RX ADMIN — SODIUM CHLORIDE, SODIUM LACTATE, POTASSIUM CHLORIDE, CALCIUM CHLORIDE: 600; 310; 30; 20 INJECTION, SOLUTION INTRAVENOUS at 11:15:00

## 2024-07-11 RX ADMIN — EPHEDRINE SULFATE 5 MG: 50 INJECTION INTRAVENOUS at 16:07:00

## 2024-07-11 NOTE — BRIEF OP NOTE
Pre-Operative Diagnosis: Lumbar back pain with radiculopathy affecting right lower extremity [M54.16]  Lumbar spondylosis [M47.816]  S/P lumbar fusion [Z98.1]  Osteoarthritis of spine with radiculopathy, lumbar region [M47.26]     Post-Operative Diagnosis: Lumbar back pain with radiculopathy affecting right lower extremity [M54.16]Lumbar spondylosis [M47.816]S/P lumbar fusion [Z98.1]Osteoarthritis of spine with radiculopathy, lumbar region [M47.26]      Procedure Performed:   anterior lumbar 4- lumbar 5, lumbar 5-sacral 1 discectomy and fusion with bone graft, cage, and screws    Posterior percutaneous fixation with pedicle screws from lumbar 4-sacral 1    Surgeons and Role:     * ELY Hdez DO - Primary     * Manuelito Luo MD - Assisting Surgeon    Assistant(s):  Surgical Assistant.: Liset Brown     Surgical Findings: none     Specimen: none     Estimated Blood Loss: Blood Output: 160 mL (7/11/2024  4:56 PM)      Dictation Number:  to follow    Kole Hdez DO  7/11/2024  5:17 PM

## 2024-07-11 NOTE — ANESTHESIA PROCEDURE NOTES
Peripheral IV  Date/Time: 7/11/2024 11:30 AM  Inserted by: Wicho Poe MD    Placement  Needle size: 18 G  Laterality: right  Location: hand  Local anesthetic: none  Site prep: alcohol  Technique: anatomical landmarks  Attempts: 1

## 2024-07-11 NOTE — ANESTHESIA POSTPROCEDURE EVALUATION
[facility]    Brinda Max Patient Status:  Inpatient   Age/Gender 64 year old female MRN [id number]   Location [facility] POST ANESTHESIA CARE UNIT Attending ELY Hdez DO   Hosp Day # 0 PCP Curtis Huber DO       Anesthesia Post-op Note    anterior lumbar 4- lumbar 5, lumbar 5-sacral 1 discectomy and fusion with bone graft, cage, and screws    Procedure Summary       Date: 07/11/24 Room / Location:  MAIN OR [address] [place] [address] MAIN OR    Anesthesia Start: 1115 Anesthesia Stop: 1719    Procedures:       anterior lumbar 4- lumbar 5, lumbar 5-sacral 1 discectomy and fusion with bone graft, cage, and screws (Left: Spine Lumbar)      INTRAOPERATIVE NEURO MONITORING (Bilateral: Back) Diagnosis:       Lumbar back pain with radiculopathy affecting right lower extremity      Lumbar spondylosis      S/P lumbar fusion      Osteoarthritis of spine with radiculopathy, lumbar region      (Lumbar back pain with radiculopathy affecting right lower extremity [M54.16]Lumbar spondylosis [M47.816]S/P lumbar fusion [Z98.1]Osteoarthritis of spine with radiculopathy, lumbar region [M47.26])    Surgeons: ELY Hdez DO Responsible Provider: Wicho Poe MD    Anesthesia Type: general ASA Status: 3            Anesthesia Type: general    Vitals Value Taken Time   /65 07/11/24 1720   Temp 97.5 °F (36.4 °C) 07/11/24 1720   Pulse 77 07/11/24 1720   Resp 16 07/11/24 1720   SpO2 93 % 07/11/24 1720   Vitals shown include unfiled device data.    Patient Location: PACU    Anesthesia Type: general    Airway Patency: patent and extubated    Postop Pain Control: adequate    Mental Status: mildly sedated but able to meaningfully participate in the post-anesthesia evaluation    Nausea/Vomiting: none    Cardiopulmonary/Hydration status: stable euvolemic    Complications: no apparent anesthesia related complications    Postop vital signs: stable    Dental Exam: Unchanged from Preop    Patient to be discharged from PACU when  criteria met.

## 2024-07-11 NOTE — INTERVAL H&P NOTE
Pre-op Diagnosis: Lumbar back pain with radiculopathy affecting right lower extremity [M54.16]  Lumbar spondylosis [M47.816]  S/P lumbar fusion [Z98.1]  Osteoarthritis of spine with radiculopathy, lumbar region [M47.26]    The above referenced H&P was reviewed by Kole Hdez DO on 7/11/2024, the patient was examined and no significant changes have occurred in the patient's condition since the H&P was performed.  I discussed with the patient and/or legal representative the potential benefits, risks and side effects of this procedure; the likelihood of the patient achieving goals; and potential problems that might occur during recuperation.  I discussed reasonable alternatives to the procedure, including risks, benefits and side effects related to the alternatives and risks related to not receiving this procedure.  We will proceed with procedure as planned.

## 2024-07-11 NOTE — ANESTHESIA PROCEDURE NOTES
Airway  Date/Time: 7/11/2024 11:24 AM  Urgency: elective    Airway not difficult    General Information and Staff    Patient location during procedure: OR  Anesthesiologist: Wicho Poe MD  Performed: anesthesiologist   Performed by: Wicho Poe MD  Authorized by: Wicho Poe MD      Indications and Patient Condition  Indications for airway management: anesthesia  Sedation level: deep  Preoxygenated: yes  Patient position: sniffing  Mask difficulty assessment: 1 - vent by mask    Final Airway Details  Final airway type: endotracheal airway      Successful airway: ETT  Cuffed: yes   Successful intubation technique: direct laryngoscopy  Endotracheal tube insertion site: oral  Blade: Gonzales  Blade size: #3  ETT size (mm): 7.0    Cormack-Lehane Classification: grade I - full view of glottis  Placement verified by: capnometry   Cuff volume (mL): 6  Measured from: lips  ETT to lips (cm): 21  Number of attempts at approach: 1  Number of other approaches attempted: 0

## 2024-07-11 NOTE — ANESTHESIA PREPROCEDURE EVALUATION
PRE-OP EVALUATION    Patient Name: Brinda Max    Admit Diagnosis: Lumbar back pain with radiculopathy affecting right lower extremity [M54.16]  Lumbar spondylosis [M47.816]  S/P lumbar fusion [Z98.1]  Osteoarthritis of spine with radiculopathy, lumbar region [M47.26]    Pre-op Diagnosis: Lumbar back pain with radiculopathy affecting right lower extremity [M54.16]  Lumbar spondylosis [M47.816]  S/P lumbar fusion [Z98.1]  Osteoarthritis of spine with radiculopathy, lumbar region [M47.26]    anterior lumbar 4- lumbar 5, lumbar 5-sacral 1 discectomy and fusion with bone graft, cage, and screws    Anesthesia Procedure: anterior lumbar 4- lumbar 5, lumbar 5-sacral 1 discectomy and fusion with bone graft, cage, and screws (Left: Spine Lumbar)  INTRAOPERATIVE NEURO MONITORING (Bilateral)  . (Spine Lumbar)    Surgeons and Role:     * ELY Hdez DO - Primary     * Manuelito Luo MD - Assisting Surgeon    Pre-op vitals reviewed.        Body mass index is 32.5 kg/m².    Current medications reviewed.  Hospital Medications:  No current facility-administered medications on file as of 7/11/2024.       Outpatient Medications:     Medications Prior to Admission   Medication Sig Dispense Refill Last Dose   • ATORVASTATIN 20 MG Oral Tab TAKE 1 TABLET BY MOUTH DAILY 100 tablet 2    • diazePAM 10 MG Oral Tab Take 0.5 tablets (5 mg total) by mouth nightly as needed. (Patient not taking: Reported on 7/3/2024) 30 tablet 0    • losartan 50 MG Oral Tab Take 1 tablet (50 mg total) by mouth daily. 100 tablet 2    • cycloSPORINE non-modified 25 MG Oral Cap TAKE 3 CAPSULES BY MOUTH EVERY  MORNING AND 2 CAPSULES EVERY  EVENING 450 capsule 3    • predniSONE 5 MG Oral Tab Take 1 tablet (5 mg total) by mouth daily. 90 tablet 1    • ASPIRIN LOW DOSE 81 MG Oral Tab EC Take 1 tablet (81 mg total) by mouth daily. (Patient not taking: Reported on 7/3/2024)      • Mycophenolate Mofetil 500 MG Oral Tab Take 2 tablets (1,000 mg total) by mouth 2  (two) times daily. 360 tablet 3    • magnesium oxide 400 MG Oral Tab Take 1 tablet (400 mg total) by mouth 2 (two) times daily.      • multivitamin Oral Tab Take 1 tablet by mouth daily.      • Cinnamon 500 MG Oral Cap Take 1,000 mg by mouth 2 (two) times daily.      • Omega 3 1000 MG Oral Cap Take 2,000 mg by mouth 2 (two) times daily.   (Patient not taking: Reported on 7/3/2024)      • Cholecalciferol (VITAMIN D) 1000 UNITS Oral Cap Take 1 capsule by mouth 2 (two) times daily.      • traMADol 50 MG Oral Tab Take 1 tablet (50 mg total) by mouth every 6 (six) hours as needed for Pain. (Patient not taking: Reported on 7/3/2024) 30 tablet 0 Not Taking   • Glucose Blood In Vitro Strip 1 each by Other route daily. 90 each 3        Allergies: Adhesive tape and Ace inhibitors      Anesthesia Evaluation    Patient summary reviewed.    Anesthetic Complications  (-) history of anesthetic complications         GI/Hepatic/Renal  Comment: S/p kidney transplant (glomerulosclerosis) 22yrs ago.   Negative GI/hepatic/renal ROS.         (+) chronic renal disease (Stage 3 CKD)                    Cardiovascular                (+) obesity  (+) hypertension   (+) hyperlipidemia                                  Endo/Other      (+) diabetes (no meds)  type 2, not using insulin                  (+) arthritis       Pulmonary    Negative pulmonary ROS.  (-) asthma           (-) recent URI   (-) sleep apnea       Neuro/Psych  Comment: Lumbar back pain with radiculopathy affecting right lower extremity [M54.16]   Lumbar spondylosis [M47.816]   S/P lumbar fusion [Z98.1]   Osteoarthritis of spine with radiculopathy, lumbar region [M47.26]   Right leg symptoms/foot numbness                                Past Surgical History:   Procedure Laterality Date   • Colonoscopy  2013, 2017   • Organ transplant  2002    Kidney   • Other surgical history  age 42    Renal transplant   • Tonsillectomy  1964   • Transplantation of kidney      4/25/02 at  [facility]     Social History     Socioeconomic History   • Marital status:    Tobacco Use   • Smoking status: Never   • Smokeless tobacco: Never   Vaping Use   • Vaping status: Never Used   Substance and Sexual Activity   • Alcohol use: No   • Drug use: No   Other Topics Concern   • Caffeine Concern Yes   • Exercise No     History   Drug Use No     Available pre-op labs reviewed.  Lab Results   Component Value Date    WBC 6.9 04/17/2024    RBC 4.03 04/17/2024    HGB 11.9 (L) 04/17/2024    HCT 38.6 04/17/2024    MCV 95.8 04/17/2024    MCH 29.5 04/17/2024    MCHC 30.8 (L) 04/17/2024    RDW 13.6 04/17/2024    .0 04/17/2024     Lab Results   Component Value Date     04/17/2024    K 4.2 04/17/2024     04/17/2024    CO2 27.0 04/17/2024    BUN 25 (H) 04/17/2024    CREATSERUM 1.41 (H) 04/17/2024    GLU 96 04/17/2024    CA 9.5 04/17/2024     Lab Results   Component Value Date    INR 0.95 06/25/2024         Airway      Mallampati: I  Mouth opening: >3 FB  TM distance: > 6 cm  Neck ROM: full Cardiovascular      Rhythm: regular  Rate: normal     Dental  Comment: No loose teeth           Pulmonary      Breath sounds clear to auscultation bilaterally.               Other findings        ASA: 3   Plan: general  NPO status verified and patient meets guidelines.        Comment:     Plan/risks discussed with: patient and spouse  Use of blood product(s) discussed with: spouse and patient          Present on Admission:  **None**

## 2024-07-12 ENCOUNTER — APPOINTMENT (OUTPATIENT)
Dept: GENERAL RADIOLOGY | Facility: HOSPITAL | Age: 64
End: 2024-07-12
Attending: NEUROLOGICAL SURGERY
Payer: MEDICARE

## 2024-07-12 VITALS
SYSTOLIC BLOOD PRESSURE: 126 MMHG | HEART RATE: 88 BPM | WEIGHT: 173.75 LBS | DIASTOLIC BLOOD PRESSURE: 75 MMHG | HEIGHT: 61 IN | TEMPERATURE: 98 F | BODY MASS INDEX: 32.8 KG/M2 | OXYGEN SATURATION: 97 % | RESPIRATION RATE: 18 BRPM

## 2024-07-12 PROBLEM — E78.5 DYSLIPIDEMIA: Status: ACTIVE | Noted: 2024-07-12

## 2024-07-12 PROBLEM — I10 BENIGN ESSENTIAL HTN: Status: ACTIVE | Noted: 2017-04-06

## 2024-07-12 LAB
ANION GAP SERPL CALC-SCNC: 5 MMOL/L (ref 0–18)
BUN BLD-MCNC: 26 MG/DL (ref 9–23)
CALCIUM BLD-MCNC: 8.8 MG/DL (ref 8.5–10.1)
CHLORIDE SERPL-SCNC: 112 MMOL/L (ref 98–112)
CO2 SERPL-SCNC: 24 MMOL/L (ref 21–32)
CREAT BLD-MCNC: 1.57 MG/DL
EGFRCR SERPLBLD CKD-EPI 2021: 36 ML/MIN/1.73M2 (ref 60–?)
GLUCOSE BLD-MCNC: 154 MG/DL (ref 70–99)
HCT VFR BLD AUTO: 29.3 %
HGB BLD-MCNC: 9.6 G/DL
OSMOLALITY SERPL CALC.SUM OF ELEC: 300 MOSM/KG (ref 275–295)
POTASSIUM SERPL-SCNC: 4.9 MMOL/L (ref 3.5–5.1)
SODIUM SERPL-SCNC: 141 MMOL/L (ref 136–145)

## 2024-07-12 PROCEDURE — 72100 X-RAY EXAM L-S SPINE 2/3 VWS: CPT | Performed by: NEUROLOGICAL SURGERY

## 2024-07-12 PROCEDURE — 97530 THERAPEUTIC ACTIVITIES: CPT

## 2024-07-12 PROCEDURE — 80048 BASIC METABOLIC PNL TOTAL CA: CPT | Performed by: NEUROLOGICAL SURGERY

## 2024-07-12 PROCEDURE — 97535 SELF CARE MNGMENT TRAINING: CPT

## 2024-07-12 PROCEDURE — 85014 HEMATOCRIT: CPT | Performed by: NEUROLOGICAL SURGERY

## 2024-07-12 PROCEDURE — 97165 OT EVAL LOW COMPLEX 30 MIN: CPT

## 2024-07-12 PROCEDURE — 85018 HEMOGLOBIN: CPT | Performed by: NEUROLOGICAL SURGERY

## 2024-07-12 PROCEDURE — 97161 PT EVAL LOW COMPLEX 20 MIN: CPT

## 2024-07-12 RX ORDER — HYDROCODONE BITARTRATE AND ACETAMINOPHEN 5; 325 MG/1; MG/1
2 TABLET ORAL EVERY 4 HOURS PRN
Status: DISCONTINUED | OUTPATIENT
Start: 2024-07-12 | End: 2024-07-12

## 2024-07-12 RX ORDER — HYDROCODONE BITARTRATE AND ACETAMINOPHEN 5; 325 MG/1; MG/1
1-2 TABLET ORAL EVERY 4 HOURS PRN
Qty: 60 TABLET | Refills: 0 | Status: SHIPPED | OUTPATIENT
Start: 2024-07-12

## 2024-07-12 RX ORDER — ASPIRIN 81 MG/1
81 TABLET, COATED ORAL DAILY
Status: SHIPPED | COMMUNITY
Start: 2024-07-16

## 2024-07-12 RX ORDER — HYDROCODONE BITARTRATE AND ACETAMINOPHEN 5; 325 MG/1; MG/1
1 TABLET ORAL EVERY 4 HOURS PRN
Status: DISCONTINUED | OUTPATIENT
Start: 2024-07-12 | End: 2024-07-12

## 2024-07-12 RX ORDER — PSEUDOEPHEDRINE HCL 30 MG
100 TABLET ORAL 2 TIMES DAILY
Qty: 30 CAPSULE | Refills: 0 | Status: CANCELLED
Start: 2024-07-12

## 2024-07-12 RX ORDER — PSEUDOEPHEDRINE HCL 30 MG
100 TABLET ORAL 2 TIMES DAILY
Qty: 30 CAPSULE | Refills: 0 | Status: SHIPPED | OUTPATIENT
Start: 2024-07-12

## 2024-07-12 RX ORDER — DIAZEPAM 5 MG/1
5 TABLET ORAL EVERY 6 HOURS PRN
Qty: 30 TABLET | Refills: 0 | Status: SHIPPED | OUTPATIENT
Start: 2024-07-12

## 2024-07-12 NOTE — PLAN OF CARE
Pt admitted from ED, AOx4. VSS. Physical assessment completed. On PCA pump. Denies pain at this time. SCDs maintained , LSO when OOB instructed, ICE gel in placed. Encourage IS. Safety precaution maintained, Call light within reach. Will monitor

## 2024-07-12 NOTE — CM/SW NOTE
07/12/24 0900   CM/SW Referral Data   Referral Source Social Work (self-referral)   Reason for Referral Discharge planning   Informant EMR;Clinical Staff Member   Discharge Needs   Anticipated D/C needs No anticipated discharge needs       Order received for pt needing assistance accessing medications and/or supplies.  Pt is a 63 y/o woman admitted s/p lumbar fusion.  PT indicating pt is appropriate for DC to home.  Spoke with RN who indicated no concerns related to medications/supplies.  / to remain available for support and/or discharge planning.     Miriam Miranda, [facility]  Discharge Planner  [phone]

## 2024-07-12 NOTE — OPERATIVE REPORT
Date of surgery: 7/11/24    Preoperative diagnosis: L4-5 and L5-S1 spondylolisthesis    Postop diagnosis: Same    Procedure (this is stage II operative dictation for a 2 staged operation)  1.  Posterior percutaneous placement of pedicle screws from L4-S1  2.  Wiltsie approach for placement of percutaneous pedicle screws  3.  Use of Elizabeth minimally invasive system and specialized tools for placement of pedicle screws from  and rods  4.  Direct stimulation of guidewires and screws with neuromonitoring  5.  AP and Lat fluoro to ensure proper hardware placement    Surgeon: Lemuel    Assistant: none    Anesthesia: GETA    IV fluids: 1200    EBL: 50                       urine output: 250    Specimen: None    Complications: None    Dispo: Extubated to PACU    Brief history:    This is a 64-year-old female with low back pain and right lower extremity radiculopathy who successfuly underwent an ALIF from L4-S1 and planned for posterior percutaneous fixation all risks and benefits of the surgery were explained in full to the patient and his family and they wish to proceed with the operative intervention.    Please note that this is the operative dictation for the stage II of these 2 staged operation.    Operative report  :  After successfully undergoing the anterior lumbar interbody fusion and closure the patient was then positioned prone upon the Hansel table.  All bony prominences well-padded.  At this point in time utilizing localization with fluoroscopy in AP and lateral we localized our incision for the percutaneous placement of pedicle screws at L4-S1 bilaterally.  Once the incision was marked upon the patient's back this area was prepped and draped in normal sterile fashion.    A 10 blade knife was used to carry out to paramedian vertical longitudinal incisions with dissection completed using monopolar electrocautery as well as dissection through the fascia bilaterally.  At this point in time utilizing Andie  needles we then cannulated the pedicles at L4-S1 bilaterally we confirmed appropriate placement of our Jamshidi needles with direct stimulation and AP and lateral fluoroscopy.  Guidewires were then passed safely through the Jamshidi needles into the vertebral bodies.  Then utilizing the [employer] system we placed pedicle screws at L4-S1 bilaterally utilizing MIS technique.  Once the pedicle screws were in place and confirmation confirmed using AP and lateral fluoroscopic imaging we then carefully passed to appropriately sized lordotic rods within the towers and setscrews were placed and final tightened.  With all hardware in place final AP and lateral fluoroscopic imaging was obtained and at this point in time the towers were then carefully removed.  We then injected the paraspinal musculature with 20 to 30 cc of lidocaine with epinephrine closed the fascia in a simple interrupted fashion using 0 Vicryl stitch in a simple interrupted fashion.  We then closed the subdermal layers of each incision using 2-0 Vicryl stitch in a simple interrupted fashion and 4-0 Monocryl in a running subcuticular fashion to reapproximate the skin edges bilaterally.    Once the incisions were closed Mastisol and Steri-Strips were applied to the wound edges dressing was applied to the patient's back.  All needle and sponge counts were correct.  At this point in time the patient was returned to the supine position upon the hospital bed resuscitated by the anesthesia service and taken to the recovery room extubated having tolerated the operative procedure well.

## 2024-07-12 NOTE — PROGRESS NOTES
NURSING DISCHARGE NOTE    Discharged Home via Wheelchair.  Accompanied by Support staff  Belongings Taken by patient/family.    Patient cleared for discharge by neurosurg, hospitliast, and PT/OT. IV removed. Patient educated on all AVS discharge information. Instructed to resume aspirin on POD 5 per neurosurgery. All questions answered. Instructed to  medications from pharmacy. Patient brought down to [facility] with all belongings and paper work to be driven home by .

## 2024-07-12 NOTE — OPERATIVE REPORT
[facility]   part of [facility]     Operative Report    Patient Name:  Brinda Max  MR:  [id number]  :  1960  DOS:  24    Preop Dx:    Lumbar spondylolisthesis at L4-5 and L5-S1   Postop Dx:    Lumbar spondylolisthesis at L4-5 and L5-S1     Procedure:    Anterior lumbar interbody fusion, L4-L5 (08626-83, 22)  Anterior lumbar interbody fusion L5 to S1 (44941-35)  Anterior lumbar instrumentation (50562-18)  Anterior insertion of interbody biomechanical device at L4-L5 level (14961-68)  Anterior insertion of interbody biomechanical device at L5-S1 level (15535-99)  Ureterolysis (58575)  Real-time intraoperative C-arm fluoroscopy with image guidance and surgeon interpretation (06334)    Surgeon:  Manuelito Luo MD, Lincoln Hdez DO  Surgical Assistant.: Liset Brown CSA  EBL: Blood Output: 160 mL (2024  4:56 PM)    Complication:  None    Description:    Pt is a 64 year old female who is undergoing ALIFs with Dr. Lincoln Hdez.  General surgery was asked to assist in exposing the anterior disc spaces.    The patient was taken to the OR and placed in supine position.  General anesthesia was established and the abdomen was prepped in standard fashion.  Fluoroscopy was used to ann the L4-S1 levels.  A vertical incision was made over the left lower quadrant.  The anterior left rectus fascia was incised and the left rectus muscle was mobilized medially.  A vertical incision was made in the transversalis fascia and the retroperitoneum was entered.  The peritoneum and left ureter were identified and mobilized medially.  We were able to free the left ureter from the retroperitoneum and the iliac vessels.  The left ureter was mobilized to the right side of the disc spaces.  Left ureterolysis was performed safely without any obvious injury seen.  Just medial to the left iliac vein, we dissected the prevertebral space.  The median sacral vessels were divided to further expose the L5-S1 disc space.   The retractors were adjusted cephalad.  The left iliac vessels were mobilized medially.  The ascending lumbar vein was identified and divided to allow further medial mobilization of the iliac vessels.  This portion of the procedure is tedious and laborious compared to the L5-S1 exposure thus warrants a modifier 22.  The retractors were adjusted to expose the L4-L5 disc space.  Fluoroscopy was used to confirm the L4-L5 level.  The case was turned over to Dr. Lincoln Hdez and I assisted with the discectomy and implant placement.    After fluoroscopic confirmation of the implant placement at L4-L5, the retractors were adjusted caudad to expose the anterior disc space at L5-S1.  Fluoroscopy was used to confirm the L5-S1 level.  The case was turned over to Dr. Lincoln Hdez and I assisted with the discectomy and implant placement.    After fluoroscopic confirmation of the implant placement at L5-S1, the operative field was irrigated with saline.  Adequate hemostasis was seen.  The retractors were slowly removed and the operative field remained hemostatic.  There were no injury to the left ureter, peritoneal contents or the sympathetic chain during our dissection.  The pulse ox on the left toe showed a normal signal at the end of the procedure.  A left transversus abdominus plane block was performed using 0.5% marcaine.  The anterior fascia was closed using 0 looped PDS.  We closed the subcutaneous tissue and skin. All instrument and sponge counts were correct.  I was present to expose the anterior disc spaces.    Manuelito Luo MD

## 2024-07-12 NOTE — PROGRESS NOTES
[facility]  Neurosurgery Progress Note    Brinda Max Patient Status:  Inpatient    1960 MRN [id number]   Location [facility] 3SW-A Attending ELY Hdez,    Hosp Day # 1 PCP Curtis Huber DO     PRINCIPLE PROBLEM:   S/p L4-S1 ALIF, L4-S1 percutaneous screw fixation POD #1    SUBJECTIVE     Pt examined, sitting in chair. Reports expected post-operative back pain but is otherwise doing well. Legs feel great. Denies any new numbness, tingling, weakness or pain.  She would like to go home today.    OBJECTIVE/PHYSICAL EXAM     VITALS:  /70 (BP Location: Right arm)   Pulse 89   Temp 98.8 °F (37.1 °C) (Oral)   Resp 16   Ht 61\"   Wt 173 lb 11.6 oz (78.8 kg)   SpO2 99%   BMI 32.82 kg/m²     GENERAL: No acute distress, non-toxic appearing, mood appropriate    HEENT: Normocephalic, atraumatic    RESP:  Respirations non-labored, even    CV:  NSR on tele    NEURO: Alert and oriented x 3.  Sensation to light touch is intact bilaterally.  Strength 5/5 in all extremities. HERNANDEZ x 4. Gait deferred.     SKIN: Surgical dressing C/D/I     LABS     Lab Results   Component Value Date    HGB 9.6 2024    HCT 29.3 2024    CREATSERUM 1.57 2024    BUN 26 2024     2024    K 4.9 2024     2024    CO2 24.0 2024     2024    CA 8.8 2024    PGLU 180 2024     IMAGING     XR FLUOROSCOPY C-ARM TIME LESS THAN 1 HOUR (CPT=76000)    Result Date: 2024  CONCLUSION:  Fluoroscopy and images are provided for lumbar fusion L4 through S1.  Pedicle screws and plate devices in good position.  Disc spacers from anterior broach noted in good position.   LOCATION:  [id number]    Dictated by (CST): Kristian Villarreal MD on 2024 at 5:10 PM     Finalized by (CST): Kristian Villarreal MD on 2024 at 5:11 PM        ASSESSMENT & PLAN     ASSESSMENT:  S/p L4-S1 ALIF, L4-S1 percutaneous screw fixation POD #1    PLAN:  DC PCA, transition to  PO pain medications  DC hooper catheter  LSO brace when OOB  Post-op XR lumbar spine pending  PT/OT  Pain medications as ordered  Medical management per hospitalist  DVT prophylaxis - SCDs, TEDs  Okay to discharge from a neurosurgical standpoint, pending pain control and XR L spine  DC instructions reviewed with patient   Medications sent to pharmacy  Follow-up appointment scheduled    Plan was discussed with Dr. Hdez.    Roxana Mo, APRN-NP  [facility]  7/12/2024, 8:11 AM

## 2024-07-12 NOTE — DISCHARGE INSTRUCTIONS
**Hold Aspirin until post-op day 5.    Lumbar Fusion: Discharge Instructions     Activity Restrictions   No twisting, pulling, pushing or lifting > 5 lbs.  No lifting overhead.  No bending at the waist - bend at the knees but keep your back straight.    Sitting  Sit with your knees at about the same level as your hips; use a footstool if needed.  Do not sit in soft or overstuffed chairs. Firm chairs with straight backs give better support.   Recliners are OK but may need to add pillows in order to not sink into the chair.  Use a raised toilet seat if needed.  Change position every 20-30 minutes when sitting (example: after sitting, stand, then you can sit again for another 20-30 minutes).    Exercise  Walk several times a day - walking is your best exercise after surgery.  Walking shorter distances is better. Your goal is to increase the distance you walk each day.  Remember to listen to your body!    Sex  After 2-3 weeks, when comfortable and approved by your surgeon.  Stop if causing pain.  Check with surgeon for more information.    Sleeping  Use a firm mattress.  Lay on side or back, not stomach.  May use pillow under knees, between legs or behind back if lying on your side.  Log roll to turn.    Driving  Do Not drive while taking narcotics or muscle relaxants.  Driving is usually allowed after 2-3 weeks; check with physician at first office visit.  Adhere to sitting restrictions and take breaks as needed.    Stairs  You may climb stairs as needed.    Vito Hose  Wear until you are able to be up and walking.  May take off hose at night and for bathing.  Hand wash hose with mild soap, allow to air dry.    Back Brace  Wear brace when up and about, out of bed.  May remove when showering. Place brace back on after showering.  You do not need to wear brace while sitting or lying on couch.  Brace is worn for 10-14 days after surgery. We will discuss discontinuing use of brace at your 1-week post op visit.     Incisional  Care  You may remove the outer dressing and leave your incision open to air after post-op day 2.  If your incision is rubbing on clothing, you may apply a dry dressing using dry sterile gauze and paper tape.  Your incision is closed with dissolvable sutures on the inside, and steri strips on the outside.   Steri strips will be removed at your 1 week visit. It is okay if some steri strips fall off on their own.  Monitor incision for any redness, drainage, warmth, swelling, or opening. Call surgeon immediately if you notice any of these.  Do not apply lotions, serums, oils or ointments on or near incision.  Always wash hands before and after dressing changes.    Bathing & Showering  No submerging incision in water until cleared by surgeon (no baths, hot tubs, pools, etc.)  You may shower on post op day 2.  After showering, pat incision dry and leave incision open to air.  If you have a brace, you may remove it to shower.     Diet and constipation prevention  Drink six to eight glasses liquid (water, juice) per day.  Eat a high fiber diet (bran, vegetables, fruit).  Use an over the counter stool softener such as Colace or Senakot while taking narcotics to prevent constipation. You may also use laxatives, such as Miralax or Milk of Magnesia, as needed.  An enema or suppository may be needed if above measures do not work.    Smoking  Smoking is prohibited after surgery, as it will inhibit the spine from healing with a solid fusion.  Even one cigarette a day will cause problems.  Chewing tobacco, nicotine gum, or using nicotine patches will also inhibit bone healing.    Pain Management  Relaxation techniques:  A way to focus your attention other than on your pain. Your brain makes endorphins that are a natural body chemical that can decrease pain. Some examples of relaxation techniques are deep breathing, listening to music or meditating.  Ice Packs:  You may apply ice packs to your incision for 15 minute intervals,  multiple times per day.    Be sure there is a cloth barrier between skin and ice pack.     Medications:  You will be prescribed a pain medication and muscle relaxant after surgery to aid in controlling your post-operative pain.  Avoid NSAIDS until cleared by your surgeon. NSAIDS (non-steroidal anti- inflammatory) include: Motrin, ibuprofen, Advil, Aleve, Naprosyn, Indocin, Nuprin, Vioxx, Celebrex, Bextra  You may use Tylenol (acetaminophen) as needed. Use caution if your pain medication contains Tylenol (acetaminophen). Do not exceed 4,000 mg of Tylenol (acetaminophen) in 24 hours.  Take muscle relaxants and pain medications as prescribed allowing 30-45 minutes to take effect.  DO NOT drink alcohol, drive, or operate machinery while taking pain medication and muscle relaxants.  Monitor need for pain medication closely.  Call pharmacy or physician office at least five days before running out of pain medication. If calling physician office after 3 pm, it will be handled on the next business day (excluding weekends).     Post-Op Follow Up  You will have a follow-up with the Nurse Practitioner 1 week after surgery to check your incision and to evaluate your progress.   You will have a follow-up with the Surgeon 1 month after surgery.   Schedule one week follow up with Primary Care Physician to review all medications. This is a standard practice, as your PCP manages your care extensively outside of your surgical care.      Return to Work  When cleared by surgeon. Discuss specific work activities with your surgeon.    When to contact your surgeon  Temp > 101F; Take your temperature twice a day  Increased pain, swelling, redness, or drainage to incision  Separation of incision  New numbness or weakness to arms or legs - some numbness, tingling and pain is common after surgery as your nerves continue to heal. Discuss this with your surgeon to determine what is considered normal versus abnormal after surgery.   Difficulty  urinating or having bowel movements  Severe headaches    Go directly to the ER or CALL 911 if you:  become short of breath  have chest pain  cough up blood  have unexplained anxiety with breathing  notice pain, redness, or swelling in your calves

## 2024-07-12 NOTE — PROGRESS NOTES
[facility]   part of [facility]     Hospitalist Progress Note     Brinda Max Patient Status:  Inpatient    1960 MRN [id number]   Location [facility] 3SW-A Attending ELY Hdez,    Hosp Day # 1 PCP Curtis Huber DO     Chief Complaint: med management    Subjective:     Patient without acute events overnight. Pain controlled. Feels her leg is feeling better this morning. Hopes to go home later.     Objective:    Review of Systems:   A comprehensive review of systems was completed; pertinent positive and negatives stated in subjective.    Vital signs:  Temp:  [97.5 °F (36.4 °C)-98.8 °F (37.1 °C)] 98.8 °F (37.1 °C)  Pulse:  [47-89] 89  Resp:  [10-18] 16  BP: (116-140)/() 120/70  SpO2:  [95 %-100 %] 99 %    Physical Exam:    General: No acute distress  Respiratory: No wheezes, no rhonchi  Cardiovascular: S1, S2, regular rate and rhythm  Abdomen: Soft, Non-tender, non-distended, positive bowel sounds  Neuro: No new focal deficits.   Extremities: No edema      Diagnostic Data:    Labs:  Recent Labs   Lab 24  0549   HGB 9.6*       Recent Labs   Lab 24  0549   *   BUN 26*   CREATSERUM 1.57*   CA 8.8      K 4.9      CO2 24.0       Estimated Creatinine Clearance: 26.6 mL/min (A) (based on SCr of 1.57 mg/dL (H)).    No results for input(s): \"TROP\", \"TROPHS\", \"CK\" in the last 168 hours.    No results for input(s): \"PTP\", \"INR\" in the last 168 hours.               Microbiology    No results found for this visit on 24.      Imaging: Reviewed in Epic.    Medications:    sennosides  17.2 mg Oral Nightly    docusate sodium  100 mg Oral BID    dexamethasone  4 mg Intravenous Q6H    atorvastatin  20 mg Oral Daily    cholecalciferol  1,000 Units Oral BID    cycloSPORINE non-modified  75 mg Oral QAM    cycloSPORINE non-modified  50 mg Oral QPM    losartan  50 mg Oral Daily    magnesium oxide  400 mg Oral BID    multivitamin  1 tablet Oral Daily     mycophenolate mofetil  1,000 mg Oral BID       Assessment & Plan:      #Post ALIF L4-S1 with posterior percutaneous fixation, hx L4-5 and L5-S1 spondylolisthesis  -primary management per neurosurgical team  -pain control: Diluadid PCA, valium, methocarbamol, transition to orals  -decadron     #renal transplant  -cont home cyclosporine, mycophenolate  -hold home prednisone while on decadron     #HLD  -cont home statin     #HTN  -cont home losartan      Terrence Rebolledo MD    Supplementary Documentation:     Quality:  DVT Mechanical Prophylaxis: VERONIKA hose, SCDs,    DVT Pharmacologic Prophylaxis   Medication   None                Code Status: Not on file  Price: Price catheter in place  Price Duration (in days): 2  Central line:    MARTHA: 7/12/2024    Discharge is dependent on: progress  At this point Ms. Max is expected to be discharge to: home    The 21st Century Cures Act makes medical notes like these available to patients in the interest of transparency. Please be advised this is a medical document. Medical documents are intended to carry relevant information, facts as evident, and the clinical opinion of the practitioner. The medical note is intended as peer to peer communication and may appear blunt or direct. It is written in medical language and may contain abbreviations or verbiage that are unfamiliar.

## 2024-07-12 NOTE — OPERATIVE REPORT
Date of surgery  7/11/24     Preoperative dx  lumbar spondylolisthesis at L4-5 and L5-S1     Postoperative dx  same     Procedure [please list them in numbered format]   1. Anterior right-sided paraumbilical retroperitoneal approach to lumbar spine from L4-5 and L5-S1   2. Anterior disc resections at L4-5 and L5-S1   3. Anterior interbody fusion Seligman titanium implant at L4-5 and L5-S1   4. Use of Magnetos within implant for fusion at L4-5 and L5-S1   5. Anterior instrumentation from L4-5 and L5-S1 with Elizabeth screws   6. Radiographs for identification of surgical level and for confirmation of appropriate placement of grafts [use of fluoroscopy for duration of instrumentation]   7. Modifier for higher than normal complexity due to prior abdominal surgery      Surgeon[s]  Lemuel     Co surgeon Dr. Luo    Assistant: Chris Ann     Anesthesia GETA     IVF  1500     EBL  50     Uout  250     Specimen  none     Drains  none     Complications  none     Condition stable for Stage 2 (prone)     Indications     This is a 63 YO F with intractable back pain and RLE radiculopathy having failed [all nonoperative management]. It was mutually decided that surgical intervention was the best option at this point. All risks and benefits of the surgery were explained to the patient, and he/she wished to proceed with the surgery.     Details of surgery     Patient was placed supine on the OR table [with a small bump placed under the small of his back/flank] to increase lordosis of the lumbar spine]. [S]He was then placed under general anesthesia and intubated. All bony prominences were well-padded. His abdomen was then prepped and draped in standard fashion. Dr Luo then performed a left-sided anterior paraumbilical retroperitoneal approach to the lumbar spine. Please refer to his dictated operative note for details on the approach. After adequately and safely exposing the desired intervetebral disc spaces, a confirmatory lateral  radiograph was taken with a needle in the disc space. [He] then placed self-retaining retractors for continuous exposure.   I then began the discectomy procedure. [Discectomy was performed identically at each level]. Annulotomy was performed using an 11-blade scalpel. Then pituitary rongeurs, curettes, kerrison rongeurs, and puneet were used to remove disc material. The hubbard was used to completely denude the endplates. [An angled curette was used to free up the posterior edges of the vertebral bodies by releasing the insertion of the PLL there]. [Posteriorly herniated disc material was carefully removed with curettes and pituitary rongeurs].   [Additional preparation of the disc space was then performed using the appropriate housing guide, rasps, and box osteotomes provided for the system being used]. A trial implant was then malleted into the disc space to assess for the appropriate size of the implant/graft. [Fluoroscopy was used to verify this]. [Magnetos was stuffed into the intramedullar space of the graft being used]. Then the implant/graft was then carefully malleted in to the appropriate depth. [Again fluoroscopy was used to verify this]. [Then instrumentation was performed across the graft].   Discectomy and implant/graft placement was performed similary at each level, with Dr Luo adjusting the retractors as needed. After all implants/grafts were in place, abundant irrigation of the wound was performed with antibiotic-containing irrigation. All bleeding was controlled with bipolar cautery, thrombin-soaked gelfoam, and floseal. Dr Luo then began the closure. The posterior rectus sheath was closed using 0-vicryl in running fashion. The anterior rectus sheath was closed using 0-vicryl in running fashion. The dermis was closed using 2-0 vicryl in running fashion.  [The skin was closed using 3-0 Biosyn in subcuticular fashion]. Xeroform gauze was applied, followed by 4x4 gauze, then microporous tape.   All needle  and sponge counts were correct. There were no complication during the surgery.  The patient was then positioned for Stage 2 which was the percutaneous pedicle screw fixation on the Hansel table. (Please see separate operative report)

## 2024-07-12 NOTE — PLAN OF CARE
Patient alert and oriented x4. VSS on RA. PCA discontinued. Pain controlled with PO PRN pain meds. Denies n/t. Gauze and tegaderm to lower abdomen dry and intact. Telfa and tegaderm to lower back, CDI. TEDs and SCDs in place, ankle pumps encouraged, IS encouraged. Pt up x1 with the walker and LSO brace. Price removed, DTV by 1530. Tolerating diet, no c/o n/v.     Plan: DTV, PT/OT, discharge when cleared by all servcies

## 2024-07-12 NOTE — CONSULTS
[facility] HOSPITALIST  CONSULT     Brinda Max Patient Status:  Inpatient    1960 MRN [id number]   Location [facility] 3SW-A Attending ELY Hdez,    Hosp Day # 0 PCP Curtis Huber DO     Reason for consult: medical management    Requested by: Neurosurgery    Subjective:   History of Present Illness:     Brinda Max is a 64 year old female with PMHx HTN/ DM/ renal transplant/ HLD who presented to the hospital for a scheduled ALIF L4-S1 with posterior percutaneous fixation. She was seen post-operatively. She reports some mild pain with coughing but is pain free at rest. She otherwise feels at her baseline state of health.    History/Other:    Past Medical History:  Past Medical History:    Arthritis    Knees bone on bone    Back problem    Cancer (HCC)    SKIN CANCER    Easy bruising    ASA 81 mg daily    Glomerulonephritis    Hemorrhoids    Pregnancy    High blood pressure    History of COVID-19    fatigue. not hospitalized. no continued symptoms    Kidney failure    Transplant 02    Osteoarthritis    Other and unspecified hyperlipidemia    Pain in joints    Renal disorder    kidney transplant    Type II or unspecified type diabetes mellitus without mention of complication, not stated as uncontrolled    Unspecified essential hypertension    Visual impairment    glasses / contacts    Wears glasses     Past Surgical History:   Past Surgical History:   Procedure Laterality Date    Colonoscopy  , 2017    Organ transplant  2002    Kidney    Other surgical history  age 42    Renal transplant    Tonsillectomy  1964    Transplantation of kidney      02 at [place]      Family History:   Family History   Problem Relation Age of Onset    Other (Other) Mother         alzheimers    Diabetes Mother         Type 1    Other Mother         Parkinsons    Other (Other) Self 54        basal skin cancer     Social History:    reports that she has never smoked. She has never used smokeless  tobacco. She reports that she does not drink alcohol and does not use drugs.     Allergies:   Allergies   Allergen Reactions    Adhesive Tape OTHER (SEE COMMENTS)     Caused redness after being on for a length of time    Ace Inhibitors Coughing       Medications:    No current facility-administered medications on file prior to encounter.     Current Outpatient Medications on File Prior to Encounter   Medication Sig Dispense Refill    cycloSPORINE non-modified 25 MG Oral Cap Take 3 capsules (75 mg total) by mouth every morning.      cycloSPORINE non-modified 25 MG Oral Cap Take 2 capsules (50 mg total) by mouth every evening.      ATORVASTATIN 20 MG Oral Tab TAKE 1 TABLET BY MOUTH DAILY 100 tablet 2    losartan 50 MG Oral Tab Take 1 tablet (50 mg total) by mouth daily. 100 tablet 2    predniSONE 5 MG Oral Tab Take 1 tablet (5 mg total) by mouth daily. 90 tablet 1    ASPIRIN LOW DOSE 81 MG Oral Tab EC Take 1 tablet (81 mg total) by mouth daily.      Mycophenolate Mofetil 500 MG Oral Tab Take 2 tablets (1,000 mg total) by mouth 2 (two) times daily. 360 tablet 3    magnesium oxide 400 MG Oral Tab Take 1 tablet (400 mg total) by mouth 2 (two) times daily.      multivitamin Oral Tab Take 1 tablet by mouth daily.      Cinnamon 500 MG Oral Cap Take 1,000 mg by mouth 2 (two) times daily.      Omega 3 1000 MG Oral Cap Take 2,000 mg by mouth 2 (two) times daily.      Cholecalciferol (VITAMIN D) 1000 UNITS Oral Cap Take 1 capsule by mouth 2 (two) times daily.      [DISCONTINUED] cycloSPORINE non-modified 25 MG Oral Cap TAKE 3 CAPSULES BY MOUTH EVERY  MORNING AND 2 CAPSULES EVERY  EVENING 450 capsule 3    Glucose Blood In Vitro Strip 1 each by Other route daily. 90 each 3       Review of Systems:   A comprehensive review of systems was completed.    Pertinent positives and negatives noted in the HPI.    Objective:   Physical Exam:    /64 (BP Location: Right arm)   Pulse 73   Temp 98 °F (36.7 °C) (Oral)   Resp 18   Ht  5' 1\" (1.549 m)   Wt 173 lb 11.6 oz (78.8 kg)   SpO2 100%   BMI 32.82 kg/m²   General: No acute distress, Alert  Respiratory: No rhonchi, no wheezes, on room air  Cardiovascular: S1, S2. Regular rate and rhythm  Abdomen: Soft, NT/ND, +BS  Neuro: No new focal deficits  Extremities: trace bilateral pre-tibial edema    Results:    Labs:      Labs Last 24 Hours:  No results for input(s): \"WBC\", \"HGB\", \"MCV\", \"PLT\", \"BAND\", \"INR\" in the last 168 hours.    Invalid input(s): \"LYM#\", \"MONO#\", \"BASOS#\", \"EOSIN#\"    No results for input(s): \"GLU\", \"BUN\", \"CREATSERUM\", \"GFRAA\", \"GFRNAA\", \"CA\", \"ALB\", \"NA\", \"K\", \"CL\", \"CO2\", \"ALKPHO\", \"AST\", \"ALT\", \"BILT\", \"TP\" in the last 168 hours.    No results for input(s): \"PTP\", \"INR\" in the last 168 hours.    No results for input(s): \"TROP\", \"CK\" in the last 168 hours.      Imaging: Imaging data reviewed in Epic.    Assessment & Plan:      #Post ALIF L4-S1 with posterior percutaneous fixation, hx L4-5 and L5-S1 spondylolisthesis  -primary management per neurosurgical team  -pain control: Diluadid PCA, valium, methocarbamol  -decadron    #renal transplant  -cont home cyclosporine, mycophenolate  -hold home prednisone while on decadron    #HLD  -cont home statin    #HTN  -cont home losartan          Ryanne Horan,   7/11/2024    The 21st Century Cures Act makes medical notes like these available to patients in the interest of transparency. Please be advised this is a medical document. Medical documents are intended to carry relevant information, facts as evident, and the clinical opinion of the practitioner. The medical note is intended as peer to peer communication and may appear blunt or direct. It is written in medical language and may contain abbreviations or verbiage that are unfamiliar.

## 2024-07-12 NOTE — OCCUPATIONAL THERAPY NOTE
OCCUPATIONAL THERAPY EVALUATION - INPATIENT    Room Number: 361/361-A  Evaluation Date: 7/12/2024     Type of Evaluation: Initial  Presenting Problem: s/p anterior lumbar 4- lumbar 5, lumbar 5-sacral 1 discectomy and fusion with bone graft, cage, and screws     Posterior percutaneous fixation with pedicle screws from lumbar 4-sacral 1  7/12/24    Physician Order: IP Consult to Occupational Therapy  Reason for Therapy:  ADL/IADL Dysfunction and Discharge Planning      OCCUPATIONAL THERAPY ASSESSMENT   Patient is a 64 year old female admitted on 7/11/2024 with Presenting Problem: s/p anterior lumbar 4- lumbar 5, lumbar 5-sacral 1 discectomy and fusion with bone graft, cage, and screws     Posterior percutaneous fixation with pedicle screws from lumbar 4-sacral 1  7/12/24. Co-Morbidities : DM, renal transplant, L TKA 2023  Patient is currently functioning near baseline with ADLs and functional mobility.  Prior to admission, patient's baseline is IND.  Patient met all OT goals at or close to baseline level.  Patient reports no further questions/concerns at this time.     Patient  is cleared for discharge from acute OT services viewpoint. OT will sign off.     WEIGHT BEARING RESTRICTION  Weight Bearing Restriction: None                Recommendations for nursing staff:   Transfers: MOD I with RW  Toileting location: Toilet    EVALUATION SESSION:  Patient at start of session: semi-supine in bed  FUNCTIONAL TRANSFER ASSESSMENT  Sit to Stand: Edge of Bed  Edge of Bed: Independent    BED MOBILITY  Supine to Sit : Independent  Sit to Supine (OT): Not Tested  Scooting: IND    BALANCE ASSESSMENT  Static Sitting: Independent  Sitting Bilateral: Independent  Static Standing: Modified Independent  Standing Unilateral: Modified Independent    FUNCTIONAL ADL ASSESSMENT  UB Dressing Seated: Supervision  LB Dressing Seated: Modified Independent  LB Dressing Standing: Modified Independent      ACTIVITY TOLERANCE: WFL                          O2 SATURATIONS       COGNITION  Overall Cognitive Status:  WFL - within functional limits  COGNITION ASSESSMENTS       Upper Extremity:   ROM: within functional limits   Strength: is within functional limits   Coordination:  Gross motor: WFL  Fine motor: WFL  Sensation: Light touch:  intact    EDUCATION PROVIDED  Patient: Role of Occupational Therapy; Plan of Care; Functional Transfer Techniques; Surgical Precautions; Posture/Positioning; Compensatory ADL Techniques; Proper Body Mechanics; Energy Conservation  Patient's Response to Education: Verbalized Understanding; Returned Demonstration    Equipment used: RW  Demonstrates functional use    Therapist comments: Pt is pleasant and cooperative throughout session.     Patient End of Session: In bed;Needs met;Call light within reach;RN aware of session/findings;All patient questions and concerns addressed;Family present    OCCUPATIONAL PROFILE    HOME SITUATION  Type of Home: House  Home Layout: One level  Lives With: Spouse    Toilet and Equipment: Toilet riser  Shower/Tub and Equipment: Walk-in shower;Shower chair  Other Equipment:  (RW)    Occupation/Status: walking  Hand Dominance: Right  Drives: Yes  Patient Regularly Uses: Glasses    Prior Level of Function: Pt is IND with BADLs, IADLs, driving.    SUBJECTIVE  \"I'm ready to go home.\"    PAIN ASSESSMENT  Ratin  Location: denies       OBJECTIVE  Precautions: Spine;Lumbar brace  Fall Risk: Standard fall risk    WEIGHT BEARING RESTRICTION  Weight Bearing Restriction: None                AM-PAC ‘6-Clicks’ Inpatient Daily Activity Short Form  -   Putting on and taking off regular lower body clothing?: None  -   Bathing (including washing, rinsing, drying)?: None  -   Toileting, which includes using toilet, bedpan or urinal? : None  -   Putting on and taking off regular upper body clothing?: None  -   Taking care of personal grooming such as brushing teeth?: None  -   Eating meals?: None    AM-PAC  Score:  Score: 24  Approx Degree of Impairment: 0%  Standardized Score (AM-PAC Scale): 57.54      ADDITIONAL TESTS     NEUROLOGICAL FINDINGS        PLAN   Patient has been evaluated and presents with no skilled Occupational Therapy needs at this time.  Patient discharged from Occupational Therapy services.  Please re-order if a new functional limitation presents during this admission.      Patient Evaluation Complexity Level:   Occupational Profile/Medical History LOW - Brief history including review of medical or therapy records    Specific performance deficits impacting engagement in ADL/IADL LOW  1 - 3 performance deficits    Client Assessment/Performance Deficits MODERATE - Comorbidities and min to mod modifications of tasks    Clinical Decision Making LOW - Analysis of occupational profile, problem-focused assessments, limited treatment options    Overall Complexity LOW     OT Session Time: 19 minutes  Self-Care Home Management: 15 minutes

## 2024-07-12 NOTE — PHYSICAL THERAPY NOTE
PHYSICAL THERAPY EVALUATION - INPATIENT     Room Number: 361/361-A  Evaluation Date: 2024  Type of Evaluation: Initial  Physician Order: PT Eval and Treat    Presenting Problem: s/p L4-S1 ALIF 24  Co-Morbidities : DM, renal transplant, L TKA   Reason for Therapy: Mobility Dysfunction and Discharge Planning    PHYSICAL THERAPY ASSESSMENT   Patient is a 64 year old female admitted 2024 for elective L4-S1 ALIF.   Patient is currently functioning near baseline with bed mobility, transfers, and gait. Prior to admission, patient's baseline is MOD I.       PLAN  Patient has been evaluated and presents with no skilled Physical Therapy needs at this time.  Patient discharged from Physical Therapy services.  Please re-order if a new functional limitation presents during this admission.    GOALS  Patient was able to achieve the following goals ...    Patient was able to transfer Safely and independently   Patient able to ambulate on level surfaces Safely and independently         HOME SITUATION  Type of Home: House   Home Layout: One level                Lives With: Spouse  Drives: Yes  Patient Owned Equipment: Rolling walker       Prior Level of Slope: Pt lives with spouse in ranch style home. Pt independent with ADL and mobility. Pt does not use RW or cane. Pt enjoys gardening.     SUBJECTIVE  \"Wow I don't have much pain.\"       OBJECTIVE  Precautions: Spine;Lumbar brace  Fall Risk: Standard fall risk    WEIGHT BEARING RESTRICTION  Weight Bearing Restriction: None                PAIN ASSESSMENT  Ratin  Location: back  Management Techniques: Activity promotion;Repositioning    COGNITION  Overall Cognitive Status:  WFL - within functional limits    RANGE OF MOTION AND STRENGTH ASSESSMENT  Upper extremity ROM and strength are within functional limits     Lower extremity ROM is within functional limits     Lower extremity strength is within functional limits       BALANCE  Static Sitting:  Good  Dynamic Sitting: Good  Static Standing: Fair  Dynamic Standing: Fair -    ADDITIONAL TESTS                                    ACTIVITY TOLERANCE                         O2 WALK       NEUROLOGICAL FINDINGS                        AM-PAC '6-Clicks' INPATIENT SHORT FORM - BASIC MOBILITY  How much difficulty does the patient currently have...  Patient Difficulty: Turning over in bed (including adjusting bedclothes, sheets and blankets)?: A Little   Patient Difficulty: Sitting down on and standing up from a chair with arms (e.g., wheelchair, bedside commode, etc.): A Little   Patient Difficulty: Moving from lying on back to sitting on the side of the bed?: A Little   How much help from another person does the patient currently need...   Help from Another: Moving to and from a bed to a chair (including a wheelchair)?: A Little   Help from Another: Need to walk in hospital room?: A Little   Help from Another: Climbing 3-5 steps with a railing?: A Little       AM-PAC Score:  Raw Score: 18   Approx Degree of Impairment: 46.58%   Standardized Score (AM-PAC Scale): 43.63   CMS Modifier (G-Code): CK    FUNCTIONAL ABILITY STATUS  Gait Assessment   Functional Mobility/Gait Assessment  Gait Assistance: Supervision  Distance (ft): 200  Assistive Device: Rolling walker    Skilled Therapy Provided   VC for log roll.   Supervision for bed mobility.   Pt assisted with donning brace.   VC for transfer set up.   Supervision for sit-stand.   Ambulatory with supervision using RW.   Ambulates with steady step through gait pattern and good omar.   VC for posture and sequencing.   Returned to room up in bedside chair.     Bed Mobility:  Rolling: supervision  Supine to sit: supervision   Sit to supine: NT     Transfer Mobility:  Sit to stand: supervision   Stand to sit: supervision  Gait = supervision     Therapist's comments: Reviewed spine precautions, log roll, activity recommendations, and brace use.     Exercise/Education  Provided:  Bed mobility  Energy conservation  Functional activity tolerated  Gait training  Posture  Strengthening  Transfer training    Patient End of Session: Up in chair;Needs met;Call light within reach;RN aware of session/findings;All patient questions and concerns addressed;Alarm set    Patient Evaluation Complexity Level:  History Moderate - 1 or 2 personal factors and/or co-morbidities   Examination of body systems Low - addressing 1-2 elements   Clinical Presentation Low - Stable   Clinical Decision Making Low Complexity       PT Session Time: 30 minutes  Gait Training:  minutes  Therapeutic Activity: 10 minutes  Neuromuscular Re-education:  minutes  Therapeutic Exercise: minutes

## 2024-07-15 ENCOUNTER — PATIENT OUTREACH (OUTPATIENT)
Dept: CASE MANAGEMENT | Age: 64
End: 2024-07-15

## 2024-07-15 ENCOUNTER — TELEPHONE (OUTPATIENT)
Dept: SURGERY | Facility: CLINIC | Age: 64
End: 2024-07-15

## 2024-07-15 DIAGNOSIS — Z94.0 RENAL TRANSPLANT RECIPIENT (HCC): Primary | ICD-10-CM

## 2024-07-15 DIAGNOSIS — Z02.9 ENCOUNTERS FOR ADMINISTRATIVE PURPOSE: ICD-10-CM

## 2024-07-15 PROCEDURE — 1159F MED LIST DOCD IN RCRD: CPT

## 2024-07-15 PROCEDURE — 1111F DSCHRG MED/CURRENT MED MERGE: CPT

## 2024-07-15 NOTE — TELEPHONE ENCOUNTER
Patient had L4-S1 ALIF; L4-S1 percutaneous screw placement on 7.11.24 with Dr. Hdez at [facility].     Post op day: 4    1)Asked how Brinda is feeling in general she stated:  \"Not bad! I'm doing better than I thought I would be!\"    2) Discussed Dressing with patient, informed them dressing can be removed on day 3.  Pt acknowledged dressing removed.    3)Site check for redness, drainage, swelling, discoloration, fever, etc.  Pt states incision site absent of all s/s of infection at this time. Advised to call in with any changes. Denies fever/chills.    4) Discussed Current Pain Level:  Pt states pain currently 4/10    5) Discussed Symptom improvement with patient they stated:  \"My foot is better, less numbness.\"    6) Discussed medication and asked if he/she currently needs any refills, Pt stated:   Pt is tolerating all medications well at this time, denies need for any refills, informed of refill protocol.    7) Confirm post-op appointments with patient:  1 week: 7.19.24 at 10:00am with DAYNA Hartman.  4 week: 8.9.24 at 10:20am with Dr. Hdez.  3 month: 10.2.24 at 10:00am with DAYNA Hartman.    8) Discussed Surgical Restrictions with patient:  No baths/pool/hot tub  No bending, lifting, twisting at least until 2 week follow up  Pt acknowledged.    9)Verified if pt had any other issues they needed to discuss:   N/A     10)Provided update on FMLA (received, initiated, need signature, completed etc)  N/A        Patient appreciative of outreach. Routed to provider for update.

## 2024-07-15 NOTE — PROGRESS NOTES
Initial Post Discharge Follow Up   Discharge Date: 7/12/24  Contact Date: 7/15/2024    Consent Verification:  Assessment Completed With: Patient  HIPAA Verified?  Yes    Discharge Dx:   Lumbar fusion    General:   How have you been since your discharge from the hospital?  I'm doing pretty good.  Do you have any pain since discharge?  Yes  Where: back and front.    Rating on pain scale 1-10, 10 being the worst pain you have ever experienced, what is current pain: 4  Alleviating factors: none  Aggravating factors: none  Is the pain manageable at home? Yes  When you were leaving the hospital were your discharge instructions reviewed with you? Yes  How well were your discharge instructions explained to you?   On a scale of 1-5   1- Very Poor and 5- Very well   Very Well  Do you have any questions about your discharge instructions?  No  Before leaving the hospital was your diagnoses explained to you? Yes  Do you have any questions about your diagnoses? No  Are you able to perform normal daily activities of living as you have prior to your hospital stay (dressing, bathing, ambulating to the bathroom, etc)? yes  (NCM) Was patient given a different diet per AVS? no    Medications:   Current Outpatient Medications   Medication Sig Dispense Refill    diazePAM 5 MG Oral Tab Take 1 tablet (5 mg total) by mouth every 6 (six) hours as needed (to be given if spasms not relieved by methocarbamol). 30 tablet 0    docusate sodium 100 MG Oral Cap Take 100 mg by mouth 2 (two) times daily. 30 capsule 0    HYDROcodone-acetaminophen 5-325 MG Oral Tab Take 1-2 tablets by mouth every 4 (four) hours as needed. 60 tablet 0    [START ON 7/16/2024] ASPIRIN LOW DOSE 81 MG Oral Tab EC Take 1 tablet (81 mg total) by mouth daily.      cycloSPORINE non-modified 25 MG Oral Cap Take 3 capsules (75 mg total) by mouth every morning.      cycloSPORINE non-modified 25 MG Oral Cap Take 2 capsules (50 mg total) by mouth every evening.      ATORVASTATIN 20  MG Oral Tab TAKE 1 TABLET BY MOUTH DAILY 100 tablet 2    losartan 50 MG Oral Tab Take 1 tablet (50 mg total) by mouth daily. 100 tablet 2    predniSONE 5 MG Oral Tab Take 1 tablet (5 mg total) by mouth daily. 90 tablet 1    Mycophenolate Mofetil 500 MG Oral Tab Take 2 tablets (1,000 mg total) by mouth 2 (two) times daily. 360 tablet 3    Glucose Blood In Vitro Strip 1 each by Other route daily. 90 each 3    magnesium oxide 400 MG Oral Tab Take 1 tablet (400 mg total) by mouth 2 (two) times daily.      multivitamin Oral Tab Take 1 tablet by mouth daily.      Cinnamon 500 MG Oral Cap Take 1,000 mg by mouth 2 (two) times daily.      Omega 3 1000 MG Oral Cap Take 2,000 mg by mouth 2 (two) times daily.      Cholecalciferol (VITAMIN D) 1000 UNITS Oral Cap Take 1 capsule by mouth 2 (two) times daily.       Were there any changes to your current medication(s) noted on the AVS? Yes  If so, were these medication changes discussed with you prior to leaving the hospital? Yes  If a new medication was prescribed:    Was the new medication's purpose & side effects reviewed? Yes  Do you have any questions about your new medication? No  Did you  your discharge medications when you left the hospital? Yes  Let's go over your medications together to make sure we are not missing anything. Medications Reviewed  Are there any reasons that keep you from taking your medication as prescribed? No  Are you having any concerns with constipation? No      Discharge medications reviewed/discussed/and reconciled against outpatient medications with patient.  Any changes or updates to medications sent to PCP.  Patient Acknowledged     Referrals/orders at D/C:  Referrals/orders placed at D/C? no    DME ordered at D/C? No      Discharge orders, AVS reviewed and discussed with patient. Any changes or updates to orders sent to PCP.  Patient Acknowledged      SDOH:   Transportation Needs: No Transportation Needs (7/11/2024)    Transportation Needs      Lack of Transportation: No     Car Seat: Not on file     Financial Resource Strain: Low Risk  (7/15/2024)    Financial Resource Strain     Difficulty of Paying Living Expenses: Not hard at all     Med Affordability: No         Diagnosis specifics: Ortho Spine:  Has there been a change in your incision/wound since d/c?  no  Are you following your exercise program (exercises for total joints and walking only for spine surgery)  yes  Did you understand when to take your pain medication at home? yes  On a scale of 1-5   1- Very Poor and 5- Very well   Very well  Comments:  Was it clear how to use less narcotic pain medication as your pain decreased at home? yes  Were you satisfied with the discharge process?  yes  (For SPINE PATIENTS only)      Did your surgeon order a brace or cervical collar for you after surgery? yes  While in the hospital, did you receive instruction so that you knew how to put your brace or collar on and take it off yourself?  yes  Comments:   Did you understand how to care for your brace or collar?      yes  (COLLAR: Can mention removing/replacing liner, washing liner;  BRACE:  Can mention attaching ripcords to brace so they don't' tangle when not wearing)   Were you instructed to watch your skin under brace or collar for any concerns? yes      Follow up appointments:      Your appointments       Date & Time Appointment Department ([place])    Jul 19, 2024 10:00 AM CDT Post Op Visit with Roxana Mo APRN [facility] ([facility])        Jul 25, 2024 11:20 AM CDT Hospital Follow Up with Curtis Huber DO [facility] ([facility])        Aug 09, 2024 10:20 AM CDT Post Op Visit with ELY Hdez DO [facility] ([facility])        Oct 02, 2024 10:00 AM CDT Post Op Visit with Roxana Mo,  APRN [facility] ([facility])        Dec 23, 2024 9:20 AM CST Exam - Established with Wicho Espinoza MD [facility] ([facility])              [facility]  [facility]  [address]  [facility] [id number] [phone] [facility]  [address] [facility] [address]  [phone] [facility]  [address]  [facility] [id number] [phone]            TCC  Was TCC ordered: No    PCP (If no TCC appointment)  Does patient already have a PCP appointment scheduled? Yes  NCM Confirmed PCP office TCM appointment with patient      Specialist    Does the patient have any other follow up appointment(s) needing to be scheduled? Yes  If yes: NCM reviewed upcoming specialist appointment with patient: Yes  Does the patient need assistance scheduling appointment(s): No, pt already has her post op scheduled for 7/19    Is there any reason as to why you cannot make your appointment(s)?  No     Needs post D/C:   Now that you are home, are there any needs or concerns you need addressed before your next visit with your PCP?  (DME, meds, questions, etc.): No    Interventions by NCM:   NCM reviewed discharge instructions and when to seek medical attention with the patient. She states that she is doing really good. Pain is controlled. She states that she has not had to use the Valium as she has not had any spasms. She denied having any s/s of infection in the incisions and states that it looks good. She does wear her brace. She has not checked her bp or bs. She denied having any fever,n/v/c/d, sob, lightheadedness, HA, any new numbness/tingling or weakness or any new or worsening symptoms. Med review  completed. She denied having any questions or concerns at this time.     CCM referral placed:    No    BOOK BY DATE: 7/26/24

## 2024-07-19 ENCOUNTER — OFFICE VISIT (OUTPATIENT)
Dept: SURGERY | Facility: CLINIC | Age: 64
End: 2024-07-19
Payer: COMMERCIAL

## 2024-07-19 VITALS
HEIGHT: 61 IN | SYSTOLIC BLOOD PRESSURE: 120 MMHG | WEIGHT: 179 LBS | DIASTOLIC BLOOD PRESSURE: 72 MMHG | HEART RATE: 80 BPM | BODY MASS INDEX: 33.79 KG/M2

## 2024-07-19 DIAGNOSIS — Z98.1 S/P LUMBAR FUSION: Primary | ICD-10-CM

## 2024-07-19 PROCEDURE — 1111F DSCHRG MED/CURRENT MED MERGE: CPT

## 2024-07-19 PROCEDURE — 3074F SYST BP LT 130 MM HG: CPT

## 2024-07-19 PROCEDURE — 3008F BODY MASS INDEX DOCD: CPT

## 2024-07-19 PROCEDURE — 3078F DIAST BP <80 MM HG: CPT

## 2024-07-19 PROCEDURE — 1160F RVW MEDS BY RX/DR IN RCRD: CPT

## 2024-07-19 PROCEDURE — 99024 POSTOP FOLLOW-UP VISIT: CPT

## 2024-07-19 PROCEDURE — 1159F MED LIST DOCD IN RCRD: CPT

## 2024-07-19 NOTE — PROGRESS NOTES
[facility]  Neurosurgery Clinic Visit: Post-Op Follow Up  24     Brinda Max PCP: Curtis Huber DO    1960 MRN [id number]     REASON FOR VISIT: 1 week post-op follow up    SUBJECTIVE     Brinda Max is a pleasant 64 year old female who presents for follow up s/p L4-S1 ALIF with posterior percutaneous fixation by Dr. Hdez on 24. She is accompanied by her .     Since surgery, patient states she has been doing well. She continues to report numbness to the right foot which was present prior to surgery. Back pain is improving. She takes 1 tablet of Norco twice daily. She is no longer taking muscle relaxants. She wakes with swelling to the dorsal aspect of both feet, improves once she is up and about - also present prior to surgery. She otherwise denies any new pain, paresthesias or weakness. There are no reported incisional issues.    MEDICAL HISTORY     Past Medical History:    Arthritis    Knees bone on bone    Back problem    Cancer (HCC)    SKIN CANCER    Easy bruising    ASA 81 mg daily    Glomerulonephritis    Hemorrhoids    Pregnancy    High blood pressure    History of COVID-19    fatigue. not hospitalized. no continued symptoms    Kidney failure    Transplant 02    Osteoarthritis    Other and unspecified hyperlipidemia    Pain in joints    Renal disorder    kidney transplant    Type II or unspecified type diabetes mellitus without mention of complication, not stated as uncontrolled    Unspecified essential hypertension    Visual impairment    glasses / contacts    Wears glasses      Past Surgical History:   Procedure Laterality Date    Colonoscopy  ,     Organ transplant  2002    Kidney    Other surgical history  age 42    Renal transplant    Tonsillectomy  1964    Transplantation of kidney      02 at [place]      Family History   Problem Relation Age of Onset    Other (Other) Mother         alzheimers    Diabetes Mother         Type 1     Other Mother         Parkinsons    Other (Other) Self 54        basal skin cancer      Social History     Socioeconomic History    Marital status:    Tobacco Use    Smoking status: Never    Smokeless tobacco: Never   Vaping Use    Vaping status: Never Used   Substance and Sexual Activity    Alcohol use: No    Drug use: No   Other Topics Concern    Caffeine Concern Yes    Exercise No      Allergies   Allergen Reactions    Adhesive Tape OTHER (SEE COMMENTS)     Caused redness after being on for a length of time    Ace Inhibitors Coughing        MEDICATIONS      docusate sodium 100 MG Oral Cap Take 100 mg by mouth 2 (two) times daily. 30 capsule 0    HYDROcodone-acetaminophen 5-325 MG Oral Tab Take 1-2 tablets by mouth every 4 (four) hours as needed. 60 tablet 0    ASPIRIN LOW DOSE 81 MG Oral Tab EC Take 1 tablet (81 mg total) by mouth daily.      cycloSPORINE non-modified 25 MG Oral Cap Take 3 capsules (75 mg total) by mouth every morning.      cycloSPORINE non-modified 25 MG Oral Cap Take 2 capsules (50 mg total) by mouth every evening.      ATORVASTATIN 20 MG Oral Tab TAKE 1 TABLET BY MOUTH DAILY 100 tablet 2    losartan 50 MG Oral Tab Take 1 tablet (50 mg total) by mouth daily. 100 tablet 2    predniSONE 5 MG Oral Tab Take 1 tablet (5 mg total) by mouth daily. 90 tablet 1    Mycophenolate Mofetil 500 MG Oral Tab Take 2 tablets (1,000 mg total) by mouth 2 (two) times daily. 360 tablet 3    Glucose Blood In Vitro Strip 1 each by Other route daily. 90 each 3    magnesium oxide 400 MG Oral Tab Take 1 tablet (400 mg total) by mouth 2 (two) times daily.      multivitamin Oral Tab Take 1 tablet by mouth daily.      Cinnamon 500 MG Oral Cap Take 1,000 mg by mouth 2 (two) times daily.      Omega 3 1000 MG Oral Cap Take 2,000 mg by mouth 2 (two) times daily.      Cholecalciferol (VITAMIN D) 1000 UNITS Oral Cap Take 1 capsule by mouth 2 (two) times daily.         REVIEW OF SYSTEMS     Denies fever, chills, shortness  of breath, chest pain, or calf pain.     PHYSICAL EXAMINATION     VITALS: /72 (BP Location: Right arm, Patient Position: Sitting, Cuff Size: large)   Pulse 80   Ht 61\"   Wt 179 lb (81.2 kg)   BMI 33.82 kg/m²     GENERAL:  No acute distress, non-toxic appearing    HEENT:  Normocephalic, atraumatic    SKIN: Warm, dry. Posterior lumbar incisions are approximated with strips. Incisions are clean, dry and intact without signs of drainage, edema, or erythema. Anterior abdominal incision is clear, dry, and intact without drainage, edema, or erythema    NEUROLOGICAL: Alert and oriented x3.  Sensation to light touch is intact bilaterally. Gait intact, non-antalgic. DTRs 2/4 bilaterally.      LOWER EXTREMITY STRENGTH:    Iliospoas  Hamstrings  Quads  D-flexion  P-flexion EHL     Right 5 5 5 5 5 5     Left 5 5 5 5 5 5     IMAGING     No new imaging to review    ASSESSMENT AND PLAN     ASSESSMENT:   1. S/P lumbar fusion       PLAN:   Continue to minimize bending, lifting, twisting  No lifting greater than 5 lbs  Wean out of LSO brace  Steri strips removed. Reviewed activity restrictions and incisional care  Repeat XR lumbar spine prior to next visit  F/U with Dr. Hdez in 3 weeks    Roxana Mo, MSN, APRN, FNP-[facility]  Neurosurgery   [address] [place] [address], [address]  [place], [place] [address] [phone]

## 2024-07-19 NOTE — PROGRESS NOTES
Established patient:  Reason for follow up: 1 week post op   anterior lumbar 4- lumbar 5, lumbar 5-sacral 1 discectomy and fusion with bone graft, cage, and screws Left General   INTRAOPERATIVE NEURO MONITORING Bilatera        Numeric Rating Scale:      Pain at Present:  2/10       Distribution of Pain:    bilateral and left     Most recent treatments for Current Pain Condition:   Surgery  Response to treatment: complete resolution

## 2024-07-19 NOTE — PATIENT INSTRUCTIONS
Refill policies:    Allow 2-3 business days for refills; controlled substances may take longer.  Contact your pharmacy at least 5 days prior to running out of medication and have them send an electronic request or submit request through the “request refill” option in your [employer] account.  Refills are not addressed on weekends; covering physicians do not authorize routine medications on weekends.  No narcotics or controlled substances are refilled after noon on Fridays or by on call physicians.  By law, narcotics must be electronically prescribed.  A 30 day supply with no refills is the maximum allowed.  If your prescription is due for a refill, you may be due for a follow up appointment.  To best provide you care, patients receiving routine medications need to be seen at least once a year.  Patients receiving narcotic/controlled substance medications need to be seen at least once every 3 months.  In the event that your preferred pharmacy does not have the requested medication in stock (e.g. Backordered), it is your responsibility to find another pharmacy that has the requested medication available.  We will gladly send a new prescription to that pharmacy at your request.    Scheduling Tests:    If your physician has ordered radiology tests such as MRI or CT scans, please contact Central Scheduling at [phone] right away to schedule the test.  Once scheduled, the [facility] Centralized Referral Team will work with your insurance carrier to obtain pre-certification or prior authorization.  Depending on your insurance carrier, approval may take 3-10 days.  It is highly recommended patients assure they have received an authorization before having a test performed.  If test is done without insurance authorization, patient may be responsible for the entire amount billed.      Precertification and Prior Authorizations:  If your physician has recommended that you have a procedure or additional testing performed the [facility]  Centralized Referral Team will contact your insurance carrier to obtain pre-certification or prior authorization.    You are strongly encouraged to contact your insurance carrier to verify that your procedure/test has been approved and is a COVERED benefit.  Although the [facility] Centralized Referral Team does its due diligence, the insurance carrier gives the disclaimer that \"Although the procedure is authorized, this does not guarantee payment.\"    Ultimately the patient is responsible for payment.   Thank you for your understanding in this matter.  Paperwork Completion:  If you require FMLA or disability paperwork for your recovery, please make sure to either drop it off or have it faxed to our office at [phone]. Be sure the form has your name and date of birth on it.  The form will be faxed to our Forms Department and they will complete it for you.  There is a 25$ fee for all forms that need to be filled out.  Please be aware there is a 10-14 day turnaround time.  You will need to sign a release of information (CHANDRA) form if your paperwork does not come with one.  You may call the Forms Department with any questions at [phone].  Their fax number is [phone].

## 2024-07-25 ENCOUNTER — OFFICE VISIT (OUTPATIENT)
Dept: FAMILY MEDICINE CLINIC | Facility: CLINIC | Age: 64
End: 2024-07-25
Payer: COMMERCIAL

## 2024-07-25 VITALS
OXYGEN SATURATION: 98 % | BODY MASS INDEX: 33 KG/M2 | SYSTOLIC BLOOD PRESSURE: 120 MMHG | HEART RATE: 75 BPM | DIASTOLIC BLOOD PRESSURE: 68 MMHG | TEMPERATURE: 97 F | WEIGHT: 176.38 LBS | RESPIRATION RATE: 16 BRPM

## 2024-07-25 DIAGNOSIS — M43.16 SPONDYLOLISTHESIS OF LUMBAR REGION: Primary | ICD-10-CM

## 2024-07-25 DIAGNOSIS — R20.2 PARESTHESIA OF BOTH FEET: ICD-10-CM

## 2024-07-25 DIAGNOSIS — M54.31 SCIATICA, RIGHT SIDE: ICD-10-CM

## 2024-07-25 PROCEDURE — 1159F MED LIST DOCD IN RCRD: CPT | Performed by: FAMILY MEDICINE

## 2024-07-25 PROCEDURE — 1111F DSCHRG MED/CURRENT MED MERGE: CPT | Performed by: FAMILY MEDICINE

## 2024-07-25 PROCEDURE — 3078F DIAST BP <80 MM HG: CPT | Performed by: FAMILY MEDICINE

## 2024-07-25 PROCEDURE — 99214 OFFICE O/P EST MOD 30 MIN: CPT | Performed by: FAMILY MEDICINE

## 2024-07-25 PROCEDURE — 3074F SYST BP LT 130 MM HG: CPT | Performed by: FAMILY MEDICINE

## 2024-07-25 NOTE — PROGRESS NOTES
Brinda Max is a 64 year old female.  HPI:   Brinad is here for Psot-op follow up after she had back surgery, for spondylolisthesis, she was doing relatively well but this oast week she has had more leg pain, and paresthesia in her foot, using ice and heat , her BLOOD SUGAR readings have been good and no temps and no bowel or bladder issues.  Taking pain meds  relatively infrequently, is trying to drink more.  Current Outpatient Medications   Medication Sig Dispense Refill    docusate sodium 100 MG Oral Cap Take 100 mg by mouth 2 (two) times daily. 30 capsule 0    HYDROcodone-acetaminophen 5-325 MG Oral Tab Take 1-2 tablets by mouth every 4 (four) hours as needed. 60 tablet 0    ASPIRIN LOW DOSE 81 MG Oral Tab EC Take 1 tablet (81 mg total) by mouth daily.      cycloSPORINE non-modified 25 MG Oral Cap Take 3 capsules (75 mg total) by mouth every morning.      cycloSPORINE non-modified 25 MG Oral Cap Take 2 capsules (50 mg total) by mouth every evening.      ATORVASTATIN 20 MG Oral Tab TAKE 1 TABLET BY MOUTH DAILY 100 tablet 2    losartan 50 MG Oral Tab Take 1 tablet (50 mg total) by mouth daily. 100 tablet 2    predniSONE 5 MG Oral Tab Take 1 tablet (5 mg total) by mouth daily. 90 tablet 1    Mycophenolate Mofetil 500 MG Oral Tab Take 2 tablets (1,000 mg total) by mouth 2 (two) times daily. 360 tablet 3    Glucose Blood In Vitro Strip 1 each by Other route daily. 90 each 3    magnesium oxide 400 MG Oral Tab Take 1 tablet (400 mg total) by mouth 2 (two) times daily.      multivitamin Oral Tab Take 1 tablet by mouth daily.      Cinnamon 500 MG Oral Cap Take 1,000 mg by mouth 2 (two) times daily.      Omega 3 1000 MG Oral Cap Take 2,000 mg by mouth 2 (two) times daily.      Cholecalciferol (VITAMIN D) 1000 UNITS Oral Cap Take 1 capsule by mouth 2 (two) times daily.      diazePAM 5 MG Oral Tab Take 1 tablet (5 mg total) by mouth every 6 (six) hours as needed (to be given if spasms not relieved by methocarbamol).  (Patient not taking: Reported on 7/19/2024) 30 tablet 0      Past Medical History:    Arthritis    Knees bone on bone    Back problem    Cancer (HCC)    SKIN CANCER    Easy bruising    ASA 81 mg daily    Glomerulonephritis    Hemorrhoids    Pregnancy    High blood pressure    History of COVID-19    fatigue. not hospitalized. no continued symptoms    Kidney failure    Transplant 4/25/02    Osteoarthritis    Other and unspecified hyperlipidemia    Pain in joints    Renal disorder    kidney transplant    Type II or unspecified type diabetes mellitus without mention of complication, not stated as uncontrolled    Unspecified essential hypertension    Visual impairment    glasses / contacts    Wears glasses      Social History:  Social History     Socioeconomic History    Marital status:    Tobacco Use    Smoking status: Never    Smokeless tobacco: Never   Vaping Use    Vaping status: Never Used   Substance and Sexual Activity    Alcohol use: No    Drug use: No   Other Topics Concern    Caffeine Concern Yes    Exercise No     Social Determinants of Health     Financial Resource Strain: Low Risk  (7/15/2024)    Financial Resource Strain     Difficulty of Paying Living Expenses: Not hard at all     Med Affordability: No   Food Insecurity: No Food Insecurity (7/11/2024)    Food Insecurity     Food Insecurity: Never true   Transportation Needs: No Transportation Needs (7/11/2024)    Transportation Needs     Lack of Transportation: No    [facility], [facility]    Social Connections   Housing Stability: Low Risk  (7/11/2024)    Housing Stability     Housing Instability: No        REVIEW OF SYSTEMS:   GENERAL HEALTH: feels well otherwise  SKIN: denies any unusual skin lesions or rashes  RESPIRATORY: denies shortness of breath with exertion  CARDIOVASCULAR: denies chest pain on exertion  GI: denies abdominal pain and denies heartburn  NEURO: denies headaches, s/p lumbar  fusion, anterior approach with placement of hardware    EXAM:   /68   Pulse 75   Temp 97.3 °F (36.3 °C) (Temporal)   Resp 16   Wt 176 lb 6 oz (80 kg)   SpO2 98%   BMI 33.33 kg/m²   GENERAL: well developed, well nourished,in no apparent distress  SKIN: incision on both sides of the lower back  HEENT: atraumatic, normocephalic,ears and throat are clear  NECK: supple,no adenopathy,no bruits  LUNGS: clear to auscultation  CARDIO: RRR without murmur  GI: good BS's,no masses, HSM or tenderness, there is a midline incision which is healing well, but not completely healed  EXTREMITIES: no cyanosis, clubbing  thee is some visible edema of both LE, Bilateral barefoot skin diabetic exam is abnormal with diabetic monofilament/sensation testing abnormal    ASSESSMENT AND PLAN:     Encounter Diagnoses   Name Primary?    Spondylolisthesis of lumbar region Yes    Sciatica, right side     Paresthesia of both feet    DON;T GET TOO AMBITIOUS WITH ACTIVITY  WATCH SALT INTAKE AND WEAR COMPRESSION STOCKINGS   PAIN MEDS as NEEDED, keep feet elevated  The patient indicates understanding of these issues and agrees to the plan.  The patient is asked to return in 4 weeks.

## 2024-08-05 ENCOUNTER — HOSPITAL ENCOUNTER (OUTPATIENT)
Dept: GENERAL RADIOLOGY | Facility: HOSPITAL | Age: 64
Discharge: HOME | End: 2024-08-05
Payer: MEDICARE

## 2024-08-05 ENCOUNTER — OFFICE VISIT (OUTPATIENT)
Dept: SURGERY | Facility: CLINIC | Age: 64
End: 2024-08-05
Payer: COMMERCIAL

## 2024-08-05 VITALS — HEART RATE: 75 BPM | OXYGEN SATURATION: 98 % | SYSTOLIC BLOOD PRESSURE: 116 MMHG | DIASTOLIC BLOOD PRESSURE: 72 MMHG

## 2024-08-05 DIAGNOSIS — Z98.1 S/P LUMBAR SPINAL FUSION: Primary | ICD-10-CM

## 2024-08-05 DIAGNOSIS — Z98.1 S/P LUMBAR FUSION: ICD-10-CM

## 2024-08-05 PROCEDURE — 72110 X-RAY EXAM L-2 SPINE 4/>VWS: CPT

## 2024-08-05 NOTE — PATIENT INSTRUCTIONS
Refill policies:    Allow 2-3 business days for refills; controlled substances may take longer.  Contact your pharmacy at least 5 days prior to running out of medication and have them send an electronic request or submit request through the “request refill” option in your [employer] account.  Refills are not addressed on weekends; covering physicians do not authorize routine medications on weekends.  No narcotics or controlled substances are refilled after noon on Fridays or by on call physicians.  By law, narcotics must be electronically prescribed.  A 30 day supply with no refills is the maximum allowed.  If your prescription is due for a refill, you may be due for a follow up appointment.  To best provide you care, patients receiving routine medications need to be seen at least once a year.  Patients receiving narcotic/controlled substance medications need to be seen at least once every 3 months.  In the event that your preferred pharmacy does not have the requested medication in stock (e.g. Backordered), it is your responsibility to find another pharmacy that has the requested medication available.  We will gladly send a new prescription to that pharmacy at your request.    Scheduling Tests:    If your physician has ordered radiology tests such as MRI or CT scans, please contact Central Scheduling at [phone] right away to schedule the test.  Once scheduled, the [facility] Centralized Referral Team will work with your insurance carrier to obtain pre-certification or prior authorization.  Depending on your insurance carrier, approval may take 3-10 days.  It is highly recommended patients assure they have received an authorization before having a test performed.  If test is done without insurance authorization, patient may be responsible for the entire amount billed.      Precertification and Prior Authorizations:  If your physician has recommended that you have a procedure or additional testing performed the [facility]  Centralized Referral Team will contact your insurance carrier to obtain pre-certification or prior authorization.    You are strongly encouraged to contact your insurance carrier to verify that your procedure/test has been approved and is a COVERED benefit.  Although the [facility] Centralized Referral Team does its due diligence, the insurance carrier gives the disclaimer that \"Although the procedure is authorized, this does not guarantee payment.\"    Ultimately the patient is responsible for payment.   Thank you for your understanding in this matter.  Paperwork Completion:  If you require FMLA or disability paperwork for your recovery, please make sure to either drop it off or have it faxed to our office at [phone]. Be sure the form has your name and date of birth on it.  The form will be faxed to our Forms Department and they will complete it for you.  There is a 25$ fee for all forms that need to be filled out.  Please be aware there is a 10-14 day turnaround time.  You will need to sign a release of information (CHANDRA) form if your paperwork does not come with one.  You may call the Forms Department with any questions at [phone].  Their fax number is [phone].

## 2024-08-05 NOTE — PROGRESS NOTES
S: Patient doing well s/p L spine fusion.  Denies any new symptoms.      O: AVSS   Neuro: stable    A/P s/p L spine fusion  -XR reviewed  -start PT  -F/U in 3 months

## 2024-08-05 NOTE — PROGRESS NOTES
Patient is here 3 weeks Post Op - 7/11/2024-- Sx: Left anterior lumbar 4- lumbar 5, lumbar 5-sacral 1 discectomy and fusion with bone graft, cage, and screws and bilateral posterior percutaneous pedicle screw fixation with screws and rods from lumbar 4-sacral 1    Pain Scale:  0/10  pt states still having some intermittent \"numbness to the right foot\"     Treatments: Diazepam and Norco 5-325 mg, not taking as much anymore, taking Tylenol PM prn.  No longer wearing LSO brace.    Imaging:  TODAY 8/5/2024 XR Lumbar Spine

## 2024-08-15 ENCOUNTER — OFFICE VISIT (OUTPATIENT)
Dept: FAMILY MEDICINE CLINIC | Facility: CLINIC | Age: 64
End: 2024-08-15
Payer: COMMERCIAL

## 2024-08-15 ENCOUNTER — TELEPHONE (OUTPATIENT)
Dept: PHYSICAL THERAPY | Facility: HOSPITAL | Age: 64
End: 2024-08-15

## 2024-08-15 VITALS
HEART RATE: 107 BPM | SYSTOLIC BLOOD PRESSURE: 96 MMHG | RESPIRATION RATE: 16 BRPM | WEIGHT: 174 LBS | OXYGEN SATURATION: 99 % | TEMPERATURE: 97 F | BODY MASS INDEX: 33 KG/M2 | DIASTOLIC BLOOD PRESSURE: 58 MMHG

## 2024-08-15 DIAGNOSIS — M43.16 SPONDYLOLISTHESIS OF LUMBAR REGION: Primary | ICD-10-CM

## 2024-08-15 DIAGNOSIS — M54.31 SCIATICA, RIGHT SIDE: ICD-10-CM

## 2024-08-15 PROCEDURE — 3078F DIAST BP <80 MM HG: CPT | Performed by: FAMILY MEDICINE

## 2024-08-15 PROCEDURE — 3074F SYST BP LT 130 MM HG: CPT | Performed by: FAMILY MEDICINE

## 2024-08-15 PROCEDURE — 99214 OFFICE O/P EST MOD 30 MIN: CPT | Performed by: FAMILY MEDICINE

## 2024-08-15 PROCEDURE — 1159F MED LIST DOCD IN RCRD: CPT | Performed by: FAMILY MEDICINE

## 2024-08-15 NOTE — PROGRESS NOTES
Brinda Max is a 64 year old female.  HPI:   Brinda is here for Psot-op follow up after she had back surgery, for spondylolisthesis, she was doing relatively well but then  she  had more leg pain, and  the paresthesia in her foot is improved still has some paresthesia in her foot.   her BLOOD SUGAR readings have been good and no temps and no bowel or bladder issues. Not Taking pain meds has an appt scheduled for PT 8/27, sleeping well. No radicular pain, and using a cane to ambulate about..    Current Outpatient Medications   Medication Sig Dispense Refill    mupirocin 2 % External Ointment MIX WITH VASELINE THEN APPLY TO SURGICAL SITE TWICE DAILY      ASPIRIN LOW DOSE 81 MG Oral Tab EC Take 1 tablet (81 mg total) by mouth daily.      cycloSPORINE non-modified 25 MG Oral Cap Take 3 capsules (75 mg total) by mouth every morning.      cycloSPORINE non-modified 25 MG Oral Cap Take 2 capsules (50 mg total) by mouth every evening.      ATORVASTATIN 20 MG Oral Tab TAKE 1 TABLET BY MOUTH DAILY 100 tablet 2    losartan 50 MG Oral Tab Take 1 tablet (50 mg total) by mouth daily. 100 tablet 2    predniSONE 5 MG Oral Tab Take 1 tablet (5 mg total) by mouth daily. 90 tablet 1    Mycophenolate Mofetil 500 MG Oral Tab Take 2 tablets (1,000 mg total) by mouth 2 (two) times daily. 360 tablet 3    Glucose Blood In Vitro Strip 1 each by Other route daily. 90 each 3    magnesium oxide 400 MG Oral Tab Take 1 tablet (400 mg total) by mouth 2 (two) times daily.      multivitamin Oral Tab Take 1 tablet by mouth daily.      Cinnamon 500 MG Oral Cap Take 1,000 mg by mouth 2 (two) times daily.      Omega 3 1000 MG Oral Cap Take 2,000 mg by mouth 2 (two) times daily.      Cholecalciferol (VITAMIN D) 1000 UNITS Oral Cap Take 1 capsule by mouth 2 (two) times daily.        Past Medical History:    Arthritis    Knees bone on bone    Back problem    Cancer (HCC)    SKIN CANCER    Easy bruising    ASA 81 mg daily    Glomerulonephritis     Hemorrhoids    Pregnancy    High blood pressure    History of COVID-19    fatigue. not hospitalized. no continued symptoms    Kidney failure    Transplant 4/25/02    Osteoarthritis    Other and unspecified hyperlipidemia    Pain in joints    Renal disorder    kidney transplant    Type II or unspecified type diabetes mellitus without mention of complication, not stated as uncontrolled    Unspecified essential hypertension    Visual impairment    glasses / contacts    Wears glasses      Social History:  Social History     Socioeconomic History    Marital status:    Tobacco Use    Smoking status: Never    Smokeless tobacco: Never   Vaping Use    Vaping status: Never Used   Substance and Sexual Activity    Alcohol use: No    Drug use: No   Other Topics Concern    Caffeine Concern Yes    Exercise No     Social Determinants of Health     Financial Resource Strain: Low Risk  (7/15/2024)    Financial Resource Strain     Difficulty of Paying Living Expenses: Not hard at all     Med Affordability: No   Food Insecurity: No Food Insecurity (7/11/2024)    Food Insecurity     Food Insecurity: Never true   Transportation Needs: No Transportation Needs (7/11/2024)    Transportation Needs     Lack of Transportation: No    [facility]    Social Connections   Housing Stability: Low Risk  (7/11/2024)    Housing Stability     Housing Instability: No        REVIEW OF SYSTEMS:   GENERAL HEALTH: feels well otherwise  SKIN: denies any unusual skin lesions or rashes  RESPIRATORY: denies shortness of breath with exertion  CARDIOVASCULAR: denies chest pain on exertion  GI: denies abdominal pain and denies heartburn  NEURO: denies headaches, s/p lumbar fusion, anterior approach with placement of hardware, paresthesia of right foot  MUSC: low back pain, markedly improved  EXAM:   BP 96/58   Pulse 107   Temp 97.4 °F (36.3 °C) (Temporal)   Resp 16   Wt 174 lb (78.9 kg)    SpO2 99%   BMI 32.88 kg/m²   GENERAL: well developed, well nourished,in no apparent distress  SKIN: incision on both sides of the lower back  HEENT: atraumatic, normocephalic,ears and throat are clear  NECK: supple,no adenopathy,no bruits  LUNGS: clear to auscultation  CARDIO: RRR without murmur  GI: good BS's,no masses, HSM no tenderness, midline incision which is  completely healed  EXTREMITIES: no cyanosis, clubbing  there is no edema of both LE, Bilateral barefoot skin diabetic exam is abnormal with diabetic monofilament/sensation testing abnormal    ASSESSMENT AND PLAN:     Encounter Diagnoses   Name Primary?    Spondylolisthesis of lumbar region Yes    Sciatica, right side    DON;T GET TOO AMBITIOUS WITH ACTIVITY  WATCH SALT INTAKE AND WEAR COMPRESSION STOCKINGS   PAIN MEDS as NEEDED, keep feet elevated  The patient indicates understanding of these issues and agrees to the plan.  The patient is asked to return after she finishes PT

## 2024-08-15 NOTE — PROGRESS NOTES
SPINE EVALUATION:     Diagnosis:   S/P lumbar discectomy and fusion L4-S1      Referring Provider: ELY Hdez  Date of Evaluation:    8/19/2024    Precautions:  None Next MD visit:   none scheduled  Date of Surgery: 7/11/24     PATIENT SUMMARY   Brinda Max is a 64 year old female who presents to therapy today with complaints of limping, weakness and balance difficulty. Lumbar fusion performed on 7/11/24. Feel like I have weakness in the R leg.  Pt describes pain level current 1/10, at best 0/10, at worst 1/10.   Current functional limitations include difficulty with community ambulation, difficulty lifting and carrying items.     Brinda describes prior level of function as limited by pain prior to surgery. Pt goals include resume normal ADLs.  Past medical history was reviewed with Brinda. Significant findings include  has a past medical history of Arthritis (Mid 2006 ?), Back problem, Cancer (HCC), Easy bruising, Glomerulonephritis, Hemorrhoids (1981), High blood pressure, History of COVID-19 (09/18/2023), Kidney failure (Pregnancy post op 1983), Osteoarthritis, Other and unspecified hyperlipidemia, Pain in joints, Renal disorder, Type II or unspecified type diabetes mellitus without mention of complication, not stated as uncontrolled, Unspecified essential hypertension, Visual impairment, and Wears glasses (1980).  has a past surgical history that includes other surgical history (age 42); transplantation of kidney; colonoscopy (2013, 2017); tonsillectomy (1964); and organ transplant (2002).   Pt denies diplopia, dysarthria, dysphasia, dizziness, drop attacks, bowel/bladder changes, saddle anesthesia, and KATHERINE LE N/T.    ASSESSMENT  Brinda presents to physical therapy evaluation with primary c/o LBP and LE weakness. The results of the objective tests and measures show decreased balance and decreased strength.  Functional deficits include but are not limited to difficulty with community ambulation,  difficulty lifting and carrying items.  Signs and symptoms are consistent with diagnosis of S/P lumbar fusion. Pt and PT discussed evaluation findings, pathology, POC and HEP.  Pt voiced understanding and performs HEP correctly without reported pain. Skilled Physical Therapy is medically necessary to address the above impairments and reach functional goals.     OBJECTIVE:   Observation/Posture: incision areas healing well  Neuro Screen: Decreased sensation R foot.    Lumbar AROM: (* denotes performed with pain)  Flexion: 90  Extension: 20  Sidebending: R 30; L 30  Rotation: R 70; L 70    Strength: (* denotes performed with pain)  LE   Hip flexion (L2): R 5/5; L 5/5  Hip abduction: R 5/5; L 5/5  Hip Extension: R 5/5; L 5/5   Hip ER: R 4+/5; L 4+/5  Hip IR: R 5/5; L 5/5  Knee Flexion: R 5/5; L 5/5   Knee extension (L3): R 4+/5; L 5/5   DF (L4): R 4+/5; L 5/5  Great Toe Ext (L5): R 5/5, L 5/5  PF (S1): R 4/5; L 5/5     Flexibility:   LE   Hip Flexor: R Moderate tightness, L Moderate  Hamstrings: R Moderate tightness; L Moderate tightness  Piriformis: R WNL; L WNL  Quads: R WNL; L WNL  Gastroc-soleus: R Mild tightness; L Mild tightness         Gait: pt ambulates on level ground with antalgia.  Balance: SLS R 1\", L 2\"    Today’s Treatment and Response:   Pt education was provided on exam findings, treatment diagnosis, treatment plan, expectations, and prognosis. Pt was also provided recommendations for activity modifications, possible soreness after evaluation, and modalities as needed [ice/heat]  Patient was instructed in and issued a HEP for: TA bracing, TA brace with hip add squeeze, TA bracing with BKFO    Charges: PT Eval Moderate Complexity, therapeutic ex      Total Timed Treatment: 13 min     Total Treatment Time: 40 min     Based on clinical rationale and outcome measures, this evaluation involved Moderate Complexity decision making due to 1-2 personal factors/comorbidities, 3 body structures involved/activity  limitations, and evolving symptoms including changing pain levels.  PLAN OF CARE:    Goals: (to be met in 20 visits)   Pt will improve transversus abdominis recruitment to perform proper isometric contraction without requiring verbal or tactile cuing to promote advancement of therex   Pt will demonstrate good understanding of proper posture and body mechanics to decrease pain and improve spinal safety   Pt will have decreased paraspinal mm tension to tolerate standing >60 minutes for work and home activities   Pt will demonstrate improved core strength to be able to perform lift and carry with <2/10 pain   Increase SLS to 15\" B to improve balance and stability with walking activity.   Pt will be independent and compliant with comprehensive HEP to maintain progress achieved in PT      Frequency / Duration: Patient will be seen for 2 x/week or a total of 10 visits over a 90 day period. Treatment will include: Manual Therapy, Neuromuscular Re-education, Therapeutic Exercise, and Home Exercise Program instruction    Education or treatment limitation: None  Rehab Potential:good      LEFS Score  LEFS Score: 67.5 % (8/15/2024  5:02 PM)        Patient/Family/Caregiver was advised of these findings, precautions, and treatment options and has agreed to actively participate in planning and for this course of care.    Thank you for your referral. Please co-sign or sign and return this letter via fax as soon as possible to [phone]. If you have any questions, please contact me at Dept: [phone]    Sincerely,  Electronically signed by therapist: Roman Copeland, PT    Physician's certification required: Yes  I certify the need for these services furnished under this plan of treatment and while under my care.    X___________________________________________________ Date____________________    Certification From: 8/19/2024  To:11/17/2024

## 2024-08-19 ENCOUNTER — OFFICE VISIT (OUTPATIENT)
Dept: PHYSICAL THERAPY | Age: 64
End: 2024-08-19
Attending: NEUROLOGICAL SURGERY
Payer: MEDICARE

## 2024-08-19 PROCEDURE — 97110 THERAPEUTIC EXERCISES: CPT

## 2024-08-19 PROCEDURE — 97161 PT EVAL LOW COMPLEX 20 MIN: CPT

## 2024-08-26 NOTE — PROGRESS NOTES
Diagnosis:   S/P lumbar discectomy and fusion L4-S1       Referring Provider: ELY Hdez  Date of Evaluation:    8/19/2024    Precautions:  None Next MD visit:   none scheduled  Date of Surgery: 7/11/24   Insurance Primary/Secondary: [facility] MEDICARE / N/A     # Auth Visits: med necessity            Subjective: Continue to feel good overall. Still have numbness in foot.     Pain: 1/10      Objective:     Gait: pt ambulates on level ground with antalgia.  Balance: SLS R 1\", L 2\"      Assessment: Good TA activation. Added Clamshells to aid core stability.       Goals:    (to be met in 20 visits)   Pt will improve transversus abdominis recruitment to perform proper isometric contraction without requiring verbal or tactile cuing to promote advancement of therex   Pt will demonstrate good understanding of proper posture and body mechanics to decrease pain and improve spinal safety   Pt will have decreased paraspinal mm tension to tolerate standing >60 minutes for work and home activities   Pt will demonstrate improved core strength to be able to perform lift and carry with <2/10 pain   Increase SLS to 15\" B to improve balance and stability with walking activity.   Pt will be independent and compliant with comprehensive HEP to maintain progress achieved in PT    Plan: Progress core stability  Date: 8/26/2024  TX#: 2/20 Date:                 TX#: 3/ Date:                 TX#: 4/ Date:                 TX#: 5/ Date:   Tx#: 6/   THERAPEUTIC EX 25'  Nu-step L5 8'  Gastroc stretch 3x30\" B  Hamstring stretch 3x30\" B  Piriformis stretch 3x30\" B  Clamshells 3x10 B  SL hip flexor stretch 3x30\" B       NEURO RE-ED 13'  TA bracing with hip add squeeze 5\" 3x10  TA bracing with BKFO 3x10 blue  TA bracing with standing shoulder ext 3x10 grn                     HEP: TA bracing, TA brace with hip add squeeze, TA bracing with BKFO     Charges: therapeutic ex 2, neuro re-ed       Total Timed Treatment: 38 min  Total Treatment  Time: 38 min

## 2024-08-27 ENCOUNTER — OFFICE VISIT (OUTPATIENT)
Dept: PHYSICAL THERAPY | Age: 64
End: 2024-08-27
Attending: NEUROLOGICAL SURGERY
Payer: MEDICARE

## 2024-08-27 PROCEDURE — 97112 NEUROMUSCULAR REEDUCATION: CPT

## 2024-08-27 PROCEDURE — 97110 THERAPEUTIC EXERCISES: CPT

## 2024-09-03 ENCOUNTER — HOSPITAL ENCOUNTER (OUTPATIENT)
Dept: MAMMOGRAPHY | Age: 64
Discharge: HOME | End: 2024-09-03
Attending: FAMILY MEDICINE
Payer: MEDICARE

## 2024-09-03 DIAGNOSIS — N18.30 DIABETES MELLITUS WITH STAGE 3 CHRONIC KIDNEY DISEASE (HCC): ICD-10-CM

## 2024-09-03 DIAGNOSIS — Z00.00 MEDICARE ANNUAL WELLNESS VISIT, SUBSEQUENT: ICD-10-CM

## 2024-09-03 DIAGNOSIS — E11.22 DIABETES MELLITUS WITH STAGE 3 CHRONIC KIDNEY DISEASE (HCC): ICD-10-CM

## 2024-09-03 DIAGNOSIS — Z12.31 VISIT FOR SCREENING MAMMOGRAM: ICD-10-CM

## 2024-09-03 PROCEDURE — 77067 SCR MAMMO BI INCL CAD: CPT | Performed by: FAMILY MEDICINE

## 2024-09-03 PROCEDURE — 77063 BREAST TOMOSYNTHESIS BI: CPT | Performed by: FAMILY MEDICINE

## 2024-09-04 ENCOUNTER — OFFICE VISIT (OUTPATIENT)
Dept: PHYSICAL THERAPY | Age: 64
End: 2024-09-04
Attending: NEUROLOGICAL SURGERY
Payer: MEDICARE

## 2024-09-04 PROCEDURE — 97110 THERAPEUTIC EXERCISES: CPT

## 2024-09-04 PROCEDURE — 97112 NEUROMUSCULAR REEDUCATION: CPT

## 2024-09-04 NOTE — PROGRESS NOTES
Diagnosis:   S/P lumbar discectomy and fusion L4-S1       Referring Provider: ELY Hdez  Date of Evaluation:    8/19/2024    Precautions:  None Next MD visit:   none scheduled  Date of Surgery: 7/11/24   Insurance Primary/Secondary: [facility] MEDICARE / N/A     # Auth Visits: med necessity            Subjective: No issues with back, HEP going well.     Pain: 1/10      Objective:       Flexibility:   LE   Hip Flexor: R Moderate tightness, L Moderate  Hamstrings: R Moderate tightness; L Moderate tightness  Piriformis: R WNL; L WNL  Quads: R WNL; L WNL  Gastroc-soleus: R Mild tightness; L Mild tightness        Assessment: Progressing well with core stability exercises. Continued hamstring and hip flexor tightness.       Goals:    (to be met in 20 visits)   Pt will improve transversus abdominis recruitment to perform proper isometric contraction without requiring verbal or tactile cuing to promote advancement of therex   Pt will demonstrate good understanding of proper posture and body mechanics to decrease pain and improve spinal safety   Pt will have decreased paraspinal mm tension to tolerate standing >60 minutes for work and home activities   Pt will demonstrate improved core strength to be able to perform lift and carry with <2/10 pain   Increase SLS to 15\" B to improve balance and stability with walking activity.   Pt will be independent and compliant with comprehensive HEP to maintain progress achieved in PT    Plan: Progress to balance activity  Date: 8/26/2024  TX#: 2/20 Date:  9/4/2024               TX#: 3/20 Date:                 TX#: 4/ Date:                 TX#: 5/ Date:   Tx#: 6/   THERAPEUTIC EX 25'  Nu-step L5 8'  Gastroc stretch 3x30\" B  Hamstring stretch 3x30\" B  Piriformis stretch 3x30\" B  Clamshells 3x10 B  SL hip flexor stretch 3x30\" B THERAPEUTIC EX 26'  Nu-step L5 8'  Gastroc stretch 3x30\" B  Hamstring stretch 3x30\" B  Piriformis stretch 3x30\" B  Clamshells 3x10 B  SL hip flexor  stretch 3x30\" B      NEURO RE-ED 13'  TA bracing with hip add squeeze 5\" 3x10  TA bracing with BKFO 3x10 blue  TA bracing with standing shoulder ext 3x10 grn NEURO RE-ED 17'  TA bracing with hip add squeeze/UE push with foam roll  5\" 3x10  TA bracing with BKFO 3x10 blue  TA bracing with standing shoulder ext 3x10 grn  TA bracing standing with SB push down into table 3x10                    HEP: TA bracing, TA brace with hip add squeeze, TA bracing with BKFO, aleksandar     Charges: therapeutic ex 2, neuro re-ed       Total Timed Treatment: 43 min  Total Treatment Time: 43 min

## 2024-09-06 ENCOUNTER — OFFICE VISIT (OUTPATIENT)
Dept: PHYSICAL THERAPY | Age: 64
End: 2024-09-06
Attending: NEUROLOGICAL SURGERY
Payer: MEDICARE

## 2024-09-06 DIAGNOSIS — Z94.0 RENAL TRANSPLANT RECIPIENT (HCC): ICD-10-CM

## 2024-09-06 DIAGNOSIS — N18.30 STAGE 3 CHRONIC KIDNEY DISEASE, UNSPECIFIED WHETHER STAGE 3A OR 3B CKD (HCC): ICD-10-CM

## 2024-09-06 DIAGNOSIS — I10 ESSENTIAL HYPERTENSION: ICD-10-CM

## 2024-09-06 PROCEDURE — 97112 NEUROMUSCULAR REEDUCATION: CPT

## 2024-09-06 PROCEDURE — 97110 THERAPEUTIC EXERCISES: CPT

## 2024-09-06 RX ORDER — MYCOPHENOLATE MOFETIL 500 MG/1
1000 TABLET ORAL 2 TIMES DAILY
Qty: 360 TABLET | Refills: 0 | Status: SHIPPED | OUTPATIENT
Start: 2024-09-06

## 2024-09-06 NOTE — PROGRESS NOTES
Diagnosis:   S/P lumbar discectomy and fusion L4-S1       Referring Provider: ELY Hdez  Date of Evaluation:    8/19/2024    Precautions:  None Next MD visit:   none scheduled  Date of Surgery: 7/11/24   Insurance Primary/Secondary: [facility] MEDICARE / N/A     # Auth Visits: med necessity            Subjective: Pt states she has no pain today, limping from R knee.     Pain: 0/10      Objective:       Flexibility:   LE   Hip Flexor: R Moderate tightness, L Moderate  Hamstrings: R Moderate tightness; L Moderate tightness  Piriformis: R WNL; L WNL  Quads: R WNL; L WNL  Gastroc-soleus: R Mild tightness; L Mild tightness        Assessment: Progressing well, no cues for breath support required, Plan to add more standing ther ex and alteral hip as able.     Goals:    (to be met in 20 visits)   Pt will improve transversus abdominis recruitment to perform proper isometric contraction without requiring verbal or tactile cuing to promote advancement of therex   Pt will demonstrate good understanding of proper posture and body mechanics to decrease pain and improve spinal safety   Pt will have decreased paraspinal mm tension to tolerate standing >60 minutes for work and home activities   Pt will demonstrate improved core strength to be able to perform lift and carry with <2/10 pain   Increase SLS to 15\" B to improve balance and stability with walking activity.   Pt will be independent and compliant with comprehensive HEP to maintain progress achieved in PT    Plan: Progress to balance activity, Add lateral hip strength.   Date: 8/26/2024  TX#: 2/20 Date:  9/4/2024               TX#: 3/20 Date: 9/6/24                 TX#: 4/20 Date:                 TX#: 5/ Date:   Tx#: 6/   THERAPEUTIC EX 25'  Nu-step L5 8'  Gastroc stretch 3x30\" B  Hamstring stretch 3x30\" B  Piriformis stretch 3x30\" B  Clamshells 3x10 B  SL hip flexor stretch 3x30\" B THERAPEUTIC EX 26'  Nu-step L5 8'  Gastroc stretch 3x30\" B  Hamstring stretch  3x30\" B  Piriformis stretch 3x30\" B  Clamshells 3x10 B  SL hip flexor stretch 3x30\" B THERAPEUTIC EX 26'  Nu-step L5 10'  Gastroc stretch 3x30\" B  Hamstring stretch 3x30\" B  Piriformis stretch 3x30\" B  Clamshells 3x10 B  SL hip flexor stretch 3x30\" B     NEURO RE-ED 13'  TA bracing with hip add squeeze 5\" 3x10  TA bracing with BKFO 3x10 blue  TA bracing with standing shoulder ext 3x10 grn NEURO RE-ED 17'  TA bracing with hip add squeeze/UE push with foam roll  5\" 3x10  TA bracing with BKFO 3x10 blue  TA bracing with standing shoulder ext 3x10 grn  TA bracing standing with SB push down into table 3x10 NEURO RE-ED 17'  TA bracing with hip add squeeze/UE push with foam roll  5\" 3x10  TA bracing with BKFO 3x10 blue  TA bracing with standing shoulder ext 3x10 grn  TA bracing standing with SB push down into table 3x10                   HEP: TA bracing, TA brace with hip add squeeze, TA bracing with BKFO, clamshells     Charges: therapeutic ex 2, neuro re-ed       Total Timed Treatment: 43 min  Total Treatment Time: 43 min

## 2024-09-09 ENCOUNTER — OFFICE VISIT (OUTPATIENT)
Dept: PHYSICAL THERAPY | Age: 64
End: 2024-09-09
Attending: NEUROLOGICAL SURGERY
Payer: MEDICARE

## 2024-09-09 PROCEDURE — 97112 NEUROMUSCULAR REEDUCATION: CPT

## 2024-09-09 PROCEDURE — 97110 THERAPEUTIC EXERCISES: CPT

## 2024-09-09 NOTE — PROGRESS NOTES
Diagnosis:   S/P lumbar discectomy and fusion L4-S1       Referring Provider: ELY Hdez  Date of Evaluation:    8/19/2024    Precautions:  None Next MD visit:   none scheduled  Date of Surgery: 7/11/24   Insurance Primary/Secondary: [facility] MEDICARE / N/A     # Auth Visits: med necessity            Subjective: Pt states she has no pain today, R knee is a little better today.    Pain: 0/10      Objective:       Flexibility:   LE   Hip Flexor: R Moderate tightness, L Moderate  Hamstrings: R Moderate tightness; L Moderate tightness  Piriformis: R WNL; L WNL  Quads: R WNL; L WNL  Gastroc-soleus: R Mild tightness; L Mild tightness        Assessment: Added glut med hip EXT, with core brace and TA march for greater stability and hip challenge. Trendelenburg noted in SLS challenge.    Goals:    (to be met in 20 visits)   Pt will improve transversus abdominis recruitment to perform proper isometric contraction without requiring verbal or tactile cuing to promote advancement of therex   Pt will demonstrate good understanding of proper posture and body mechanics to decrease pain and improve spinal safety   Pt will have decreased paraspinal mm tension to tolerate standing >60 minutes for work and home activities   Pt will demonstrate improved core strength to be able to perform lift and carry with <2/10 pain   Increase SLS to 15\" B to improve balance and stability with walking activity.   Pt will be independent and compliant with comprehensive HEP to maintain progress achieved in PT    Plan: Progress to balance activity, Add lateral hip strength.   Date: 8/26/2024  TX#: 2/20 Date:  9/4/2024               TX#: 3/20 Date: 9/6/24                 TX#: 4/20 Date: 9/9/24                TX#: 5/20 Date:   Tx#: 6/   THERAPEUTIC EX 25'  Nu-step L5 8'  Gastroc stretch 3x30\" B  Hamstring stretch 3x30\" B  Piriformis stretch 3x30\" B  Clamshells 3x10 B  SL hip flexor stretch 3x30\" B THERAPEUTIC EX 26'  Nu-step L5 8'  Gastroc  stretch 3x30\" B  Hamstring stretch 3x30\" B  Piriformis stretch 3x30\" B  Clamshells 3x10 B  SL hip flexor stretch 3x30\" B THERAPEUTIC EX 26'  Nu-step L5 10'  Gastroc stretch 3x30\" B  Hamstring stretch 3x30\" B  Piriformis stretch 3x30\" B  Clamshells 3x10 B  SL hip flexor stretch 3x30\" B THERAPEUTIC EX 26'  Nu-step L5 10'  Gastroc stretch 3x30\" B  Hamstring stretch 3x30\" B  Piriformis stretch 3x30\" B  Clamshells 3x10 B  SL hip flexor stretch 3x30\" B  Standing hip EXT/ABD 15x2 BUE support each LE    NEURO RE-ED 13'  TA bracing with hip add squeeze 5\" 3x10  TA bracing with BKFO 3x10 blue  TA bracing with standing shoulder ext 3x10 grn NEURO RE-ED 17'  TA bracing with hip add squeeze/UE push with foam roll  5\" 3x10  TA bracing with BKFO 3x10 blue  TA bracing with standing shoulder ext 3x10 grn  TA bracing standing with SB push down into table 3x10 NEURO RE-ED 17'  TA bracing with hip add squeeze/UE push with foam roll  5\" 3x10  TA bracing with BKFO 3x10 blue  TA bracing with standing shoulder ext 3x10 grn  TA bracing standing with SB push down into table 3x10 NEURO RE-ED 19'  TA bracing with hip add squeeze/UE push with foam roll  5\" 3x10  TA bracing with BKFO 3x10 blue  TA march 10x3  TA bracing with standing shoulder ext 3x10 grn  TA bracing standing with SB push down into table 3x10  SLS single UE support forward, lateral and backward tapping 10x each LE                    HEP: TA bracing, TA brace with hip add squeeze, TA bracing with BKFO, clamshells, TA march 10x3    Charges: therapeutic ex 2, neuro re-ed       Total Timed Treatment: 45 min  Total Treatment Time: 45 min

## 2024-09-11 ENCOUNTER — OFFICE VISIT (OUTPATIENT)
Dept: PHYSICAL THERAPY | Age: 64
End: 2024-09-11
Attending: NEUROLOGICAL SURGERY
Payer: MEDICARE

## 2024-09-11 PROCEDURE — 97110 THERAPEUTIC EXERCISES: CPT

## 2024-09-11 PROCEDURE — 97112 NEUROMUSCULAR REEDUCATION: CPT

## 2024-09-11 NOTE — PROGRESS NOTES
Diagnosis:   S/P lumbar discectomy and fusion L4-S1       Referring Provider: ELY Hdez  Date of Evaluation:    8/19/2024    Precautions:  None Next MD visit:   none scheduled  Date of Surgery: 7/11/24   Insurance Primary/Secondary: [facility] MEDICARE / N/A     # Auth Visits: med necessity            Subjective: Pt states she continues to feel great, no pain.    Pain: 0/10      Objective:       Flexibility:   LE   Hip Flexor: R Moderate tightness, L Moderate  Hamstrings: R Moderate tightness; L Moderate tightness  Piriformis: R WNL; L WNL  Quads: R WNL; L WNL  Gastroc-soleus: R Mild tightness; L Mild tightness        Assessment: Progressing well with core stability. Performs all exercises without need for correction.     Goals:    (to be met in 20 visits)   Pt will improve transversus abdominis recruitment to perform proper isometric contraction without requiring verbal or tactile cuing to promote advancement of therex   Pt will demonstrate good understanding of proper posture and body mechanics to decrease pain and improve spinal safety   Pt will have decreased paraspinal mm tension to tolerate standing >60 minutes for work and home activities   Pt will demonstrate improved core strength to be able to perform lift and carry with <2/10 pain   Increase SLS to 15\" B to improve balance and stability with walking activity.   Pt will be independent and compliant with comprehensive HEP to maintain progress achieved in PT    Plan: Progress to balance activity, Add lateral hip strength.   Date: 8/26/2024  TX#: 2/20 Date:  9/4/2024               TX#: 3/20 Date: 9/6/24                 TX#: 4/20 Date: 9/9/24                TX#: 5/20 Date: 9/11/2024  Tx#: 6/20   THERAPEUTIC EX 25'  Nu-step L5 8'  Gastroc stretch 3x30\" B  Hamstring stretch 3x30\" B  Piriformis stretch 3x30\" B  Clamshells 3x10 B  SL hip flexor stretch 3x30\" B THERAPEUTIC EX 26'  Nu-step L5 8'  Gastroc stretch 3x30\" B  Hamstring stretch 3x30\"  B  Piriformis stretch 3x30\" B  Clamshells 3x10 B  SL hip flexor stretch 3x30\" B THERAPEUTIC EX 26'  Nu-step L5 10'  Gastroc stretch 3x30\" B  Hamstring stretch 3x30\" B  Piriformis stretch 3x30\" B  Clamshells 3x10 B  SL hip flexor stretch 3x30\" B THERAPEUTIC EX 26'  Nu-step L5 10'  Gastroc stretch 3x30\" B  Hamstring stretch 3x30\" B  Piriformis stretch 3x30\" B  Clamshells 3x10 B  SL hip flexor stretch 3x30\" B  Standing hip EXT/ABD 15x2 BUE support each LE THERAPEUTIC EX 26'  Nu-step L5 10'  Gastroc stretch 3x30\" B  Hamstring stretch 3x30\" B  Piriformis stretch 3x30\" B  Clamshells 3x10 B  SL hip flexor stretch 3x30\" B   NEURO RE-ED 13'  TA bracing with hip add squeeze 5\" 3x10  TA bracing with BKFO 3x10 blue  TA bracing with standing shoulder ext 3x10 grn NEURO RE-ED 17'  TA bracing with hip add squeeze/UE push with foam roll  5\" 3x10  TA bracing with BKFO 3x10 blue  TA bracing with standing shoulder ext 3x10 grn  TA bracing standing with SB push down into table 3x10 NEURO RE-ED 17'  TA bracing with hip add squeeze/UE push with foam roll  5\" 3x10  TA bracing with BKFO 3x10 blue  TA bracing with standing shoulder ext 3x10 grn  TA bracing standing with SB push down into table 3x10 NEURO RE-ED 19'  TA bracing with hip add squeeze/UE push with foam roll  5\" 3x10  TA bracing with BKFO 3x10 blue  TA march 10x3  TA bracing with standing shoulder ext 3x10 grn  TA bracing standing with SB push down into table 3x10  SLS single UE support forward, lateral and backward tapping 10x each LE   NEURO RE-ED 19'  TA bracing with hip add squeeze/UE push with foam roll  5\" 3x10  TA bracing with BKFO 3x10 blue  TA march 10x3  TA bracing with standing shoulder ext 3x10 grn  TA bracing standing with SB push down into table 3x10  SLS single UE support forward, lateral and backward tapping 10x each LE                 HEP: TA bracing, TA brace with hip add squeeze, TA bracing with aleksandar MIN TA march 10x3    Charges: therapeutic ex 2,  neuro re-ed       Total Timed Treatment: 45 min  Total Treatment Time: 45 min

## 2024-09-17 ENCOUNTER — OFFICE VISIT (OUTPATIENT)
Dept: PHYSICAL THERAPY | Age: 64
End: 2024-09-17
Attending: NEUROLOGICAL SURGERY
Payer: MEDICARE

## 2024-09-17 ENCOUNTER — HOSPITAL ENCOUNTER (OUTPATIENT)
Dept: BONE DENSITY | Age: 64
Discharge: HOME | End: 2024-09-17
Attending: ADVANCED PRACTICE MIDWIFE
Payer: MEDICARE

## 2024-09-17 DIAGNOSIS — Z78.0 POSTMENOPAUSAL: ICD-10-CM

## 2024-09-17 PROCEDURE — 77080 DXA BONE DENSITY AXIAL: CPT | Performed by: ADVANCED PRACTICE MIDWIFE

## 2024-09-17 PROCEDURE — 97110 THERAPEUTIC EXERCISES: CPT

## 2024-09-17 PROCEDURE — 97112 NEUROMUSCULAR REEDUCATION: CPT

## 2024-09-17 NOTE — PROGRESS NOTES
Diagnosis:   S/P lumbar discectomy and fusion L4-S1       Referring Provider: ELY Hdez  Date of Evaluation:    8/19/2024    Precautions:  None Next MD visit:   none scheduled  Date of Surgery: 7/11/24   Insurance Primary/Secondary: [facility] MEDICARE / N/A     # Auth Visits: med necessity            Subjective: Pt states back feels great, more trouble with R knee than LB.     Pain: 0/10      Objective:       Flexibility:   LE   Hip Flexor: R Moderate tightness, L Moderate  Hamstrings: R Moderate tightness; L Moderate tightness  Piriformis: R WNL; L WNL  Quads: R WNL; L WNL  Gastroc-soleus: R Mild tightness; L Mild tightness        Assessment: Improved endurance clamshells and core activity. Added lateral stepping to increase glute strength to aid core stability.     Goals:    (to be met in 20 visits)   Pt will improve transversus abdominis recruitment to perform proper isometric contraction without requiring verbal or tactile cuing to promote advancement of therex   Pt will demonstrate good understanding of proper posture and body mechanics to decrease pain and improve spinal safety   Pt will have decreased paraspinal mm tension to tolerate standing >60 minutes for work and home activities   Pt will demonstrate improved core strength to be able to perform lift and carry with <2/10 pain   Increase SLS to 15\" B to improve balance and stability with walking activity.   Pt will be independent and compliant with comprehensive HEP to maintain progress achieved in PT    Plan: Progress core stability   Date:  9/4/2024               TX#: 3/20 Date: 9/6/24                 TX#: 4/20 Date: 9/9/24                TX#: 5/20 Date: 9/11/2024  Tx#: 6/20 Date: 9/17/2024  Tx#: 7/20   THERAPEUTIC EX 26'  Nu-step L5 8'  Gastroc stretch 3x30\" B  Hamstring stretch 3x30\" B  Piriformis stretch 3x30\" B  Clamshells 3x10 B  SL hip flexor stretch 3x30\" B THERAPEUTIC EX 26'  Nu-step L5 10'  Gastroc stretch 3x30\" B  Hamstring stretch  3x30\" B  Piriformis stretch 3x30\" B  Clamshells 3x10 B  SL hip flexor stretch 3x30\" B THERAPEUTIC EX 26'  Nu-step L5 10'  Gastroc stretch 3x30\" B  Hamstring stretch 3x30\" B  Piriformis stretch 3x30\" B  Clamshells 3x10 B  SL hip flexor stretch 3x30\" B  Standing hip EXT/ABD 15x2 BUE support each LE THERAPEUTIC EX 26'  Nu-step L5 10'  Gastroc stretch 3x30\" B  Hamstring stretch 3x30\" B  Piriformis stretch 3x30\" B  Clamshells 3x10 B  SL hip flexor stretch 3x30\" B THERAPEUTIC EX 26'  Nu-step L5 10'  Gastroc stretch 3x30\" B  Hamstring stretch 3x30\" B  Piriformis stretch 3x30\" B  Clamshells 3x10 B  SL hip flexor stretch 3x30\" B   NEURO RE-ED 17'  TA bracing with hip add squeeze/UE push with foam roll  5\" 3x10  TA bracing with BKFO 3x10 blue  TA bracing with standing shoulder ext 3x10 grn  TA bracing standing with SB push down into table 3x10 NEURO RE-ED 17'  TA bracing with hip add squeeze/UE push with foam roll  5\" 3x10  TA bracing with BKFO 3x10 blue  TA bracing with standing shoulder ext 3x10 grn  TA bracing standing with SB push down into table 3x10 NEURO RE-ED 19'  TA bracing with hip add squeeze/UE push with foam roll  5\" 3x10  TA bracing with BKFO 3x10 blue  TA march 10x3  TA bracing with standing shoulder ext 3x10 grn  TA bracing standing with SB push down into table 3x10  SLS single UE support forward, lateral and backward tapping 10x each LE   NEURO RE-ED 19'  TA bracing with hip add squeeze/UE push with foam roll  5\" 3x10  TA bracing with BKFO 3x10 blue  TA march 10x3  TA bracing with standing shoulder ext 3x10 grn  TA bracing standing with SB push down into table 3x10  SLS single UE support forward, lateral and backward tapping 10x each LE NEURO RE-ED 19'  TA bracing with hip add squeeze/UE push with foam roll  5\" 3x10  TA bracing with BKFO 3x10 blue  TA march 10x3  TA bracing with standing shoulder ext 3x10 grn  TA bracing standing with SB push down into table 3x10  SLS single UE support forward, lateral and  backward tapping 10x each LE with red tband  Lateral stepping 8'x4 red band                  HEP: TA bracing, TA brace with hip add squeeze, TA bracing with BKaleksandar SONG TA march 10x3    Charges: therapeutic ex 2, neuro re-ed       Total Timed Treatment: 45 min  Total Treatment Time: 45 min

## 2024-09-19 ENCOUNTER — OFFICE VISIT (OUTPATIENT)
Dept: PHYSICAL THERAPY | Age: 64
End: 2024-09-19
Attending: NEUROLOGICAL SURGERY
Payer: MEDICARE

## 2024-09-19 PROCEDURE — 97110 THERAPEUTIC EXERCISES: CPT

## 2024-09-19 PROCEDURE — 97112 NEUROMUSCULAR REEDUCATION: CPT

## 2024-09-19 NOTE — PROGRESS NOTES
Diagnosis:   S/P lumbar discectomy and fusion L4-S1       Referring Provider: ELY Hdez  Date of Evaluation:    8/19/2024    Precautions:  None Next MD visit:   none scheduled  Date of Surgery: 7/11/24   Insurance Primary/Secondary: [facility] MEDICARE / N/A     # Auth Visits: med necessity            Subjective: Pt states she is feeling pretty good, working hard on exercises at home.     Pain: 0/10      Objective:       Flexibility:   LE   Hip Flexor: R Moderate tightness, L Moderate  Hamstrings: R Moderate tightness; L Moderate tightness  Piriformis: R WNL; L WNL  Quads: R WNL; L WNL  Gastroc-soleus: R Mild tightness; L Mild tightness        Assessment: Added stair taps for functional core brace along with backward walking to challenge balance and glut endurance. Plan for d/c next visit    Goals:    (to be met in 20 visits)   Pt will improve transversus abdominis recruitment to perform proper isometric contraction without requiring verbal or tactile cuing to promote advancement of therex 9/19/24- GOAL MET  Pt will demonstrate good understanding of proper posture and body mechanics to decrease pain and improve spinal safety 9/19/24- GOAL MET  Pt will have decreased paraspinal mm tension to tolerate standing >60 minutes for work and home activities   Pt will demonstrate improved core strength to be able to perform lift and carry with <2/10 pain   Increase SLS to 15\" B to improve balance and stability with walking activity.   Pt will be independent and compliant with comprehensive HEP to maintain progress achieved in PT    Plan: Progress core stability - d/c next visit  Date:  9/4/2024               TX#: 3/20 Date: 9/6/24                 TX#: 4/20 Date: 9/9/24                TX#: 5/20 Date: 9/11/2024  Tx#: 6/20 Date: 9/17/2024  Tx#: 7/20 Date: 9/19/24  TX#: 8/20   THERAPEUTIC EX 26'  Nu-step L5 8'  Gastroc stretch 3x30\" B  Hamstring stretch 3x30\" B  Piriformis stretch 3x30\" B  Clamshells 3x10 B  SL hip  flexor stretch 3x30\" B THERAPEUTIC EX 26'  Nu-step L5 10'  Gastroc stretch 3x30\" B  Hamstring stretch 3x30\" B  Piriformis stretch 3x30\" B  Clamshells 3x10 B  SL hip flexor stretch 3x30\" B THERAPEUTIC EX 26'  Nu-step L5 10'  Gastroc stretch 3x30\" B  Hamstring stretch 3x30\" B  Piriformis stretch 3x30\" B  Clamshells 3x10 B  SL hip flexor stretch 3x30\" B  Standing hip EXT/ABD 15x2 BUE support each LE THERAPEUTIC EX 26'  Nu-step L5 10'  Gastroc stretch 3x30\" B  Hamstring stretch 3x30\" B  Piriformis stretch 3x30\" B  Clamshells 3x10 B  SL hip flexor stretch 3x30\" B THERAPEUTIC EX 26'  Nu-step L5 10'  Gastroc stretch 3x30\" B  Hamstring stretch 3x30\" B  Piriformis stretch 3x30\" B  Clamshells 3x10 B  SL hip flexor stretch 3x30\" B THERAPEUTIC EX 28 min  Nu-step L5 10'  Gastroc stretch 3x30\" B  Hamstring stretch 3x30\" B  Piriformis stretch 3x30\" B  Clamshells 3x10 B  SL hip flexor stretch 3x30\" B  Partial squat 20x   NEURO RE-ED 17'  TA bracing with hip add squeeze/UE push with foam roll  5\" 3x10  TA bracing with BKFO 3x10 blue  TA bracing with standing shoulder ext 3x10 grn  TA bracing standing with SB push down into table 3x10 NEURO RE-ED 17'  TA bracing with hip add squeeze/UE push with foam roll  5\" 3x10  TA bracing with BKFO 3x10 blue  TA bracing with standing shoulder ext 3x10 grn  TA bracing standing with SB push down into table 3x10 NEURO RE-ED 19'  TA bracing with hip add squeeze/UE push with foam roll  5\" 3x10  TA bracing with BKFO 3x10 blue  TA march 10x3  TA bracing with standing shoulder ext 3x10 grn  TA bracing standing with SB push down into table 3x10  SLS single UE support forward, lateral and backward tapping 10x each LE   NEURO RE-ED 19'  TA bracing with hip add squeeze/UE push with foam roll  5\" 3x10  TA bracing with BKFO 3x10 blue  TA march 10x3  TA bracing with standing shoulder ext 3x10 grn  TA bracing standing with SB push down into table 3x10  SLS single UE support forward, lateral and backward tapping  10x each LE NEURO RE-ED 19'  TA bracing with hip add squeeze/UE push with foam roll  5\" 3x10  TA bracing with BKFO 3x10 blue  TA march 10x3  TA bracing with standing shoulder ext 3x10 grn  TA bracing standing with SB push down into table 3x10  SLS single UE support forward, lateral and backward tapping 10x each LE with red tband  Lateral stepping 8'x4 red band  NEURO RE-ED 25  TA bracing with hip add squeeze/UE push with foam roll  5\" 3x10  TA bracing with BKFO 3x10 blue  TA march 10x3  TA bracing with standing shoulder ext 3x10 grn  TA bracing standing with SB push down into table 3x10  SLS single UE support forward, lateral and backward tapping 10x each LE with red tband  Lateral stepping 8'x4 red band  4 in stair taps core brace 10x2  Backward walkling 10ft x3                   HEP: TA bracing, TA brace with hip add squeeze, TA bracing with BKFO, clamshells, TA march 10x3    Charges:2 therapeutic ex 1 2neuro re-ed       Total Timed Treatment: 53 min  Total Treatment Time: 55 min

## 2024-09-23 ENCOUNTER — OFFICE VISIT (OUTPATIENT)
Dept: PHYSICAL THERAPY | Age: 64
End: 2024-09-23
Attending: NEUROLOGICAL SURGERY
Payer: MEDICARE

## 2024-09-23 PROCEDURE — 97110 THERAPEUTIC EXERCISES: CPT

## 2024-09-23 PROCEDURE — 97112 NEUROMUSCULAR REEDUCATION: CPT

## 2024-09-23 NOTE — PROGRESS NOTES
Diagnosis:   S/P lumbar discectomy and fusion L4-S1       Referring Provider: ELY Hdez  Date of Evaluation:    8/19/2024    Precautions:  None Next MD visit:   none scheduled  Date of Surgery: 7/11/24   Insurance Primary/Secondary: [facility] MEDICARE / N/A     # Auth Visits: med necessity           Discharge Summary  Pt has attended 9 visits in Physical Therapy.     Subjective: Pt states she is feeling good, ready for d/c. Has returned to all functional activities without restriction after surgery.    Pain: 0/10      Objective:   9/23/24- SLS time R 3 sec L 9 sec    Flexibility:   LE   Hip Flexor: R Moderate tightness, L Moderate  Hamstrings: R Moderate tightness; L Moderate tightness  Piriformis: R WNL; L WNL  Quads: R WNL; L WNL  Gastroc-soleus: R Mild tightness; L Mild tightness        Assessment: Pt to be d/c from skilled PT 2/2 meeting all goals aside from standing tolerance and SLS time goals. Pt has made gains in ROM and strength as evidenced by WNL hamstring length B LEs. Pt has improved functional strength and gait efficiency as evidenced by increased SLS time R LE by 3 sec and LLE by 7 secs for reduced antalgic pattern. Her knee is more limiting than her back for gait efficiency at this time. MATHEW score improvement of 48% by discharge noted. Pt has HEP to maintain/progress strength, ROM independently.       Goals:    (to be met in 20 visits)   Pt will improve transversus abdominis recruitment to perform proper isometric contraction without requiring verbal or tactile cuing to promote advancement of therex 9/19/24- GOAL MET  Pt will demonstrate good understanding of proper posture and body mechanics to decrease pain and improve spinal safety 9/19/24- GOAL MET  Pt will have decreased paraspinal mm tension to tolerate standing >60 minutes for work and home activities 9/23/24- NOT met, 30 mins max  Pt will demonstrate improved core strength to be able to perform lift and carry with <2/10 pain  9/23/24- GOAL MET  Increase SLS to 15\" B to improve balance and stability with walking activity. 9/23/24- NOT met  Pt will be independent and compliant with comprehensive HEP to maintain progress achieved in PT 9/23/24- GOAL MET    Plan: d/c  Date: 9/9/24                TX#: 5/20 Date: 9/11/2024  Tx#: 6/20 Date: 9/17/2024  Tx#: 7/20 Date: 9/19/24  TX#: 8/20 Date: 9/23/24  TX#: 9/20   THERAPEUTIC EX 26'  Nu-step L5 10'  Gastroc stretch 3x30\" B  Hamstring stretch 3x30\" B  Piriformis stretch 3x30\" B  Clamshells 3x10 B  SL hip flexor stretch 3x30\" B  Standing hip EXT/ABD 15x2 BUE support each LE THERAPEUTIC EX 26'  Nu-step L5 10'  Gastroc stretch 3x30\" B  Hamstring stretch 3x30\" B  Piriformis stretch 3x30\" B  Clamshells 3x10 B  SL hip flexor stretch 3x30\" B THERAPEUTIC EX 26'  Nu-step L5 10'  Gastroc stretch 3x30\" B  Hamstring stretch 3x30\" B  Piriformis stretch 3x30\" B  Clamshells 3x10 B  SL hip flexor stretch 3x30\" B THERAPEUTIC EX 28 min  Nu-step L5 10'  Gastroc stretch 3x30\" B  Hamstring stretch 3x30\" B  Piriformis stretch 3x30\" B  Clamshells 3x10 B  SL hip flexor stretch 3x30\" B  Partial squat 20x THERAPEUTIC EX 28 min  Nu-step L5 10'  Gastroc stretch 3x30\" B  Hamstring stretch 3x30\" B  Piriformis stretch 3x30\" B  Clamshells 3x10 B red band  SL hip flexor stretch 3x30\" B  Partial squat 20x   NEURO RE-ED 19'  TA bracing with hip add squeeze/UE push with foam roll  5\" 3x10  TA bracing with BKFO 3x10 blue  TA march 10x3  TA bracing with standing shoulder ext 3x10 grn  TA bracing standing with SB push down into table 3x10  SLS single UE support forward, lateral and backward tapping 10x each LE   NEURO RE-ED 19'  TA bracing with hip add squeeze/UE push with foam roll  5\" 3x10  TA bracing with BKFO 3x10 blue  TA march 10x3  TA bracing with standing shoulder ext 3x10 grn  TA bracing standing with SB push down into table 3x10  SLS single UE support forward, lateral and backward tapping 10x each LE NEURO RE-ED 19'  TA  bracing with hip add squeeze/UE push with foam roll  5\" 3x10  TA bracing with BKFO 3x10 blue  TA march 10x3  TA bracing with standing shoulder ext 3x10 grn  TA bracing standing with SB push down into table 3x10  SLS single UE support forward, lateral and backward tapping 10x each LE with red tband  Lateral stepping 8'x4 red band  NEURO RE-ED 25  TA bracing with hip add squeeze/UE push with foam roll  5\" 3x10  TA bracing with BKFO 3x10 blue  TA march 10x3  TA bracing with standing shoulder ext 3x10 grn  TA bracing standing with SB push down into table 3x10  SLS single UE support forward, lateral and backward tapping 10x each LE with red tband  Lateral stepping 8'x4 red band  4 in stair taps core brace 10x2  Backward walkling 10ft x3 NEURO RE-ED 25  TA bracing with hip add squeeze/UE push with foam roll  5\" 3x10  TA bracing with BKFO 3x10 blue  TA march 10x3  TA bracing with standing shoulder ext 3x10 grn  TA bracing standing with SB push down into table 3x10  SLS single UE support forward, lateral and backward tapping 10x each LE with red tband  Lateral stepping 8'x4 red band  4 in stair taps core brace 10x2  Backward walkling 10ft x3                 HEP: TA bracing, TA brace with hip add squeeze, TA bracing with BKFO, clamshells, TA march 10x3  Access Code: [email or web address]  URL: [email or web address]  Date: 09/23/2024  Prepared by: Katharine Washington    Exercises  - Clamshell  - 1 x daily - 7 x weekly - 3 sets - 10 reps  - Supine Transversus Abdominis Bracing - Hands on Stomach  - 1 x daily - 7 x weekly - 3 sets - 10 reps - 5 sec  hold  - Hooklying Isometric Clamshell  - 1 x daily - 7 x weekly - 3 sets - 10 reps - 3 sec hold  - Supine Hip Adduction Isometric with Ball  - 1 x daily - 7 x weekly - 3 sets - 10 reps - 3 sec hold  - Supine March  - 1 x daily - 7 x weekly - 3 sets - 10 reps  - Side Stepping with Resistance at Ankles  - 1 x daily - 7 x weekly - 3 sets - 10 reps  - Mini Squat with Counter Support   - 1 x daily - 7 x weekly - 3 sets - 10 reps    Charges:2 therapeutic ex  2neuro re-ed       Total Timed Treatment: 53 min  Total Treatment Time: 55 min  Oswestry Disability Index Score  Score: 54 % (5/1/2024 11:48 AM)    Post Oswestry Disability Index Score  Post Score: 6 % (9/23/2024  9:25 AM)    48 % improvement    Plan: Discontinue skilled Physical Therapy     Patient/Family/Caregiver was advised of these findings, precautions, and treatment options and has agreed to actively participate in planning and for this course of care.    Thank you for your referral. If you have any questions, please contact me at Dept: [phone].    Sincerely,  Electronically signed by therapist: Hedy Washington PT, DPT     Physician's certification required:  No  Please co-sign or sign and return this letter via fax as soon as possible to [phone].   I certify the need for these services furnished under this plan of treatment and while under my care.    X___________________________________________________ Date____________________    Certification From: 9/23/2024  To:12/22/2024

## 2024-10-02 ENCOUNTER — OFFICE VISIT (OUTPATIENT)
Dept: SURGERY | Facility: CLINIC | Age: 64
End: 2024-10-02
Payer: COMMERCIAL

## 2024-10-02 VITALS
WEIGHT: 174 LBS | BODY MASS INDEX: 32.85 KG/M2 | HEART RATE: 100 BPM | HEIGHT: 61 IN | SYSTOLIC BLOOD PRESSURE: 120 MMHG | DIASTOLIC BLOOD PRESSURE: 70 MMHG

## 2024-10-02 DIAGNOSIS — Z98.1 S/P LUMBAR SPINAL FUSION: Primary | ICD-10-CM

## 2024-10-02 PROCEDURE — 3074F SYST BP LT 130 MM HG: CPT

## 2024-10-02 PROCEDURE — 3008F BODY MASS INDEX DOCD: CPT

## 2024-10-02 PROCEDURE — 1160F RVW MEDS BY RX/DR IN RCRD: CPT

## 2024-10-02 PROCEDURE — 3078F DIAST BP <80 MM HG: CPT

## 2024-10-02 PROCEDURE — 1159F MED LIST DOCD IN RCRD: CPT

## 2024-10-02 PROCEDURE — 99024 POSTOP FOLLOW-UP VISIT: CPT

## 2024-10-02 NOTE — PROGRESS NOTES
Established patient:  Reason for follow up: 3 month post op   anterior lumbar 4- lumbar 5, lumbar 5-sacral 1 discectomy and fusion with bone graft, cage, and screws Left       Numeric Rating Scale:    Pain at Present:  0/10       Distribution of Pain:    left    Most recent treatments for Current Pain Condition:   Physical Therapy and Surgery  Response to treatment: complete resolution

## 2024-10-02 NOTE — PROGRESS NOTES
[facility]  Neurosurgery Clinic Visit: Post-Op Follow Up  10/02/24     Brinda Max PCP: Curtis Huber DO    1960 MRN [id number]     REASON FOR VISIT: 3 month post-op follow up    SUBJECTIVE     Brinda Max is a pleasant 64 year old female who presents for follow up s/p L4-S1 ALIF with posterior percutaneous fixation by Dr. Hdez on 24.  Patient states she is feeling very well.  She has no back pain, no lower extremity symptoms.  Completed PT.  She is seeing Ortho tomorrow to schedule a right knee replacement.    MEDICAL HISTORY     Past Medical History:    Arthritis    Knees bone on bone    Back problem    Cancer (HCC)    SKIN CANCER    Easy bruising    ASA 81 mg daily    Glomerulonephritis    Hemorrhoids    Pregnancy    High blood pressure    History of COVID-19    fatigue. not hospitalized. no continued symptoms    Kidney failure    Transplant 02    Osteoarthritis    Other and unspecified hyperlipidemia    Pain in joints    Renal disorder    kidney transplant    Type II or unspecified type diabetes mellitus without mention of complication, not stated as uncontrolled    Unspecified essential hypertension    Visual impairment    glasses / contacts    Wears glasses      Past Surgical History:   Procedure Laterality Date    Colonoscopy  , 2017    Organ transplant  2002    Kidney    Other surgical history  age 42    Renal transplant    Tonsillectomy  1964    Transplantation of kidney      02 at [place]      Family History   Problem Relation Age of Onset    Other (Other) Mother         alzheimers    Diabetes Mother         Type 1    Other Mother         Parkinsons    Other (Other) Self 54        basal skin cancer      Social History     Socioeconomic History    Marital status:    Tobacco Use    Smoking status: Never    Smokeless tobacco: Never   Vaping Use    Vaping status: Never Used   Substance and Sexual Activity    Alcohol use: No    Drug use: No   Other Topics  Concern    Caffeine Concern Yes    Exercise No      Allergies   Allergen Reactions    Adhesive Tape OTHER (SEE COMMENTS)     Caused redness after being on for a length of time    Ace Inhibitors Coughing        MEDICATIONS      Mycophenolate Mofetil 500 MG Oral Tab Take 2 tablets (1,000 mg total) by mouth 2 (two) times daily. 360 tablet 0    mupirocin 2 % External Ointment MIX WITH VASELINE THEN APPLY TO SURGICAL SITE TWICE DAILY      ASPIRIN LOW DOSE 81 MG Oral Tab EC Take 1 tablet (81 mg total) by mouth daily.      cycloSPORINE non-modified 25 MG Oral Cap Take 3 capsules (75 mg total) by mouth every morning.      cycloSPORINE non-modified 25 MG Oral Cap Take 2 capsules (50 mg total) by mouth every evening.      ATORVASTATIN 20 MG Oral Tab TAKE 1 TABLET BY MOUTH DAILY 100 tablet 2    losartan 50 MG Oral Tab Take 1 tablet (50 mg total) by mouth daily. 100 tablet 2    predniSONE 5 MG Oral Tab Take 1 tablet (5 mg total) by mouth daily. 90 tablet 1    Glucose Blood In Vitro Strip 1 each by Other route daily. 90 each 3    magnesium oxide 400 MG Oral Tab Take 1 tablet (400 mg total) by mouth 2 (two) times daily.      multivitamin Oral Tab Take 1 tablet by mouth daily.      Cinnamon 500 MG Oral Cap Take 1,000 mg by mouth 2 (two) times daily.      Omega 3 1000 MG Oral Cap Take 2,000 mg by mouth 2 (two) times daily.      Cholecalciferol (VITAMIN D) 1000 UNITS Oral Cap Take 1 capsule by mouth 2 (two) times daily.         REVIEW OF SYSTEMS     Denies fever, chills, shortness of breath, chest pain, or calf pain.     PHYSICAL EXAMINATION     VITALS: /70 (BP Location: Right arm, Patient Position: Sitting, Cuff Size: large)   Pulse 100   Ht 61\"   Wt 174 lb (78.9 kg)   BMI 32.88 kg/m²     GENERAL:  No acute distress, non-toxic appearing    HEENT:  Normocephalic, atraumatic    SKIN: Warm, dry. Surgical incision healed    NEUROLOGICAL: Alert and oriented x 3.  HERNANDEZ x 4.  Sensation to light touch is intact bilaterally. DTRs  2/4 bilaterally.  Gait intact, non-antalgic.    LOWER EXTREMITY STRENGTH:    Iliospoas  Hamstrings  Quads  D-flexion  P-flexion EHL     Right 5 5 5 5 5 5     Left 5 5 5 5 5 5     IMAGING     XR DEXA BONE DENSITOMETRY (CPT=77080)    Result Date: 9/17/2024  CONCLUSION: WHO Classification as described above. Bone mineral density values for left femoral neck are compatible with the diagnosis of osteopenia by WHO definition (T score between -1.0 and -2.5).  If therapy is initiated, follow-up study is recommended in 2 years for further evaluation of therapeutic  efficacy.  Recommendation:  The National Osteoporosis Foundation (NOF) recommends pharmacological treatment for patients with a FRAX 10-year risk score of 3% or higher for a hip fracture or 20% or higher for a major osteoporotic fracture, to prevent osteoporosis and reduce fracture risk.  The World Health Organization has defined the following categories based on bone density: Normal bone:  T-score greater than or equal to -1 Osteopenia: T-score  less than -1 and greater  than -2.5 Osteoporosis:  T-score less than or equal -2.5   LOCATION:  [facility]     Dictated by ([facility]): Rinku Scott MD on 9/17/2024 at 1:30 PM     Finalized by (CST): Rinku Scott MD on 9/17/2024 at 1:31 PM       [facility] HUMBERTO 2D+3D SCREENING BILAT (CPT=77067/90844)    Result Date: 9/3/2024  CONCLUSION:  Stable mammogram  BI-RADS CATEGORY:  DIAGNOSTIC CATEGORY 2--BENIGN FINDING NO CHANGE FROM COMPARISON ASSESSMENT.   RECOMMENDATIONS:  ROUTINE MAMMOGRAM AND CLINICAL EVALUATION IN 12 MONTHS.       A letter explaining the results in lay terms has been sent to the patient.  This exam was evaluated with a computer-aided device.  This patient's information has been entered into a reminder system with a target due date for the next mammogram.   LOCATION:  [facility]   Dictated by ([facility]): Khadra Byrd MD on 9/03/2024 at 3:54 PM     Finalized by (CST): Khadra Byrd MD on 9/03/2024 at 3:55 PM          ASSESSMENT AND PLAN     ASSESSMENT:   1. S/P lumbar spinal fusion       PLAN:   Recovering well  F/U in 3 months    Roxana Mo, MSN, APRN, FNP-[facility]  Neurosurgery   [address] [place] [address], [facility] [address]  [place] [phone]

## 2024-10-02 NOTE — PATIENT INSTRUCTIONS
Refill policies:    Allow 2-3 business days for refills; controlled substances may take longer.  Contact your pharmacy at least 5 days prior to running out of medication and have them send an electronic request or submit request through the “request refill” option in your [employer] account.  Refills are not addressed on weekends; covering physicians do not authorize routine medications on weekends.  No narcotics or controlled substances are refilled after noon on Fridays or by on call physicians.  By law, narcotics must be electronically prescribed.  A 30 day supply with no refills is the maximum allowed.  If your prescription is due for a refill, you may be due for a follow up appointment.  To best provide you care, patients receiving routine medications need to be seen at least once a year.  Patients receiving narcotic/controlled substance medications need to be seen at least once every 3 months.  In the event that your preferred pharmacy does not have the requested medication in stock (e.g. Backordered), it is your responsibility to find another pharmacy that has the requested medication available.  We will gladly send a new prescription to that pharmacy at your request.    Scheduling Tests:    If your physician has ordered radiology tests such as MRI or CT scans, please contact Central Scheduling at [phone] right away to schedule the test.  Once scheduled, the [facility] Centralized Referral Team will work with your insurance carrier to obtain pre-certification or prior authorization.  Depending on your insurance carrier, approval may take 3-10 days.  It is highly recommended patients assure they have received an authorization before having a test performed.  If test is done without insurance authorization, patient may be responsible for the entire amount billed.      Precertification and Prior Authorizations:  If your physician has recommended that you have a procedure or additional testing performed the [facility]  Spoke with patient.  Scheduled to see /Dr.James Freeman on 3/18 for 2:00.  Confirmation mailed.   Centralized Referral Team will contact your insurance carrier to obtain pre-certification or prior authorization.    You are strongly encouraged to contact your insurance carrier to verify that your procedure/test has been approved and is a COVERED benefit.  Although the [facility] Centralized Referral Team does its due diligence, the insurance carrier gives the disclaimer that \"Although the procedure is authorized, this does not guarantee payment.\"    Ultimately the patient is responsible for payment.   Thank you for your understanding in this matter.  Paperwork Completion:  If you require FMLA or disability paperwork for your recovery, please make sure to either drop it off or have it faxed to our office at [phone]. Be sure the form has your name and date of birth on it.  The form will be faxed to our Forms Department and they will complete it for you.  There is a 25$ fee for all forms that need to be filled out.  Please be aware there is a 10-14 day turnaround time.  You will need to sign a release of information (CHANDRA) form if your paperwork does not come with one.  You may call the Forms Department with any questions at [phone].  Their fax number is [phone].

## 2024-10-08 DIAGNOSIS — Z94.0 RENAL TRANSPLANT RECIPIENT (HCC): Primary | ICD-10-CM

## 2024-10-08 DIAGNOSIS — N18.30 STAGE 3 CHRONIC KIDNEY DISEASE, UNSPECIFIED WHETHER STAGE 3A OR 3B CKD (HCC): ICD-10-CM

## 2024-10-09 DIAGNOSIS — N18.30 STAGE 3 CHRONIC KIDNEY DISEASE, UNSPECIFIED WHETHER STAGE 3A OR 3B CKD (HCC): ICD-10-CM

## 2024-10-09 DIAGNOSIS — I10 ESSENTIAL HYPERTENSION: ICD-10-CM

## 2024-10-09 DIAGNOSIS — Z94.0 RENAL TRANSPLANT RECIPIENT (HCC): ICD-10-CM

## 2024-10-09 RX ORDER — PREDNISONE 5 MG/1
5 TABLET ORAL DAILY
Qty: 90 TABLET | Refills: 1 | Status: SHIPPED | OUTPATIENT
Start: 2024-10-09

## 2024-11-18 ENCOUNTER — TELEPHONE (OUTPATIENT)
Dept: FAMILY MEDICINE CLINIC | Facility: CLINIC | Age: 64
End: 2024-11-18

## 2024-11-18 NOTE — TELEPHONE ENCOUNTER
PATIENT SCHEDULED FOR PRE-OP ON 12/18/24. PATIENT HAVING KNEE SURGERY WITH DR. KUMAR. SURGERY SCHEDULED FOR 1/6/2025.

## 2024-11-18 NOTE — TELEPHONE ENCOUNTER
Tony Lopez MD   [address]   [address]   [place] [address]   Phone: [phone]   Fax: [phone]     Need Pre op information for surgery

## 2024-12-04 ENCOUNTER — TELEPHONE (OUTPATIENT)
Dept: FAMILY MEDICINE CLINIC | Facility: CLINIC | Age: 64
End: 2024-12-04

## 2024-12-04 ENCOUNTER — MED REC SCAN ONLY (OUTPATIENT)
Dept: FAMILY MEDICINE CLINIC | Facility: CLINIC | Age: 64
End: 2024-12-04

## 2024-12-10 ENCOUNTER — TELEPHONE (OUTPATIENT)
Dept: NEPHROLOGY | Facility: CLINIC | Age: 64
End: 2024-12-10

## 2024-12-10 DIAGNOSIS — I10 ESSENTIAL HYPERTENSION: ICD-10-CM

## 2024-12-10 DIAGNOSIS — N18.30 STAGE 3 CHRONIC KIDNEY DISEASE, UNSPECIFIED WHETHER STAGE 3A OR 3B CKD (HCC): ICD-10-CM

## 2024-12-10 DIAGNOSIS — Z94.0 RENAL TRANSPLANT RECIPIENT (HCC): ICD-10-CM

## 2024-12-10 RX ORDER — MYCOPHENOLATE MOFETIL 500 MG/1
1000 TABLET ORAL 2 TIMES DAILY
Qty: 360 TABLET | Refills: 3 | OUTPATIENT
Start: 2024-12-10

## 2024-12-11 ENCOUNTER — MED REC SCAN ONLY (OUTPATIENT)
Dept: NEPHROLOGY | Facility: CLINIC | Age: 64
End: 2024-12-11

## 2024-12-16 ENCOUNTER — EKG ENCOUNTER (OUTPATIENT)
Dept: LAB | Age: 64
End: 2024-12-16
Attending: ORTHOPAEDIC SURGERY
Payer: MEDICARE

## 2024-12-16 ENCOUNTER — LABORATORY ENCOUNTER (OUTPATIENT)
Dept: LAB | Age: 64
End: 2024-12-16
Attending: ORTHOPAEDIC SURGERY
Payer: MEDICARE

## 2024-12-16 DIAGNOSIS — M17.11 OSTEOARTHRITIS OF RIGHT KNEE: ICD-10-CM

## 2024-12-16 DIAGNOSIS — E78.5 DYSLIPIDEMIA ASSOCIATED WITH TYPE 2 DIABETES MELLITUS (HCC): ICD-10-CM

## 2024-12-16 DIAGNOSIS — E11.9 TYPE 2 DIABETES MELLITUS WITHOUT COMPLICATION, WITHOUT LONG-TERM CURRENT USE OF INSULIN (HCC): ICD-10-CM

## 2024-12-16 DIAGNOSIS — Z94.0 HISTORY OF RENAL TRANSPLANTATION (HCC): ICD-10-CM

## 2024-12-16 DIAGNOSIS — I10 ESSENTIAL HYPERTENSION: ICD-10-CM

## 2024-12-16 DIAGNOSIS — Z94.0 RENAL TRANSPLANT RECIPIENT (HCC): ICD-10-CM

## 2024-12-16 DIAGNOSIS — Z01.818 PRE-OP TESTING: ICD-10-CM

## 2024-12-16 DIAGNOSIS — E11.69 DYSLIPIDEMIA ASSOCIATED WITH TYPE 2 DIABETES MELLITUS (HCC): ICD-10-CM

## 2024-12-16 DIAGNOSIS — I10 ESSENTIAL HYPERTENSION WITH GOAL BLOOD PRESSURE LESS THAN 140/90: ICD-10-CM

## 2024-12-16 DIAGNOSIS — N18.30 STAGE 3 CHRONIC KIDNEY DISEASE, UNSPECIFIED WHETHER STAGE 3A OR 3B CKD (HCC): ICD-10-CM

## 2024-12-16 LAB
ALBUMIN SERPL-MCNC: 4.3 G/DL (ref 3.2–4.8)
ALBUMIN/GLOB SERPL: 1.8 {RATIO} (ref 1–2)
ALP LIVER SERPL-CCNC: 108 U/L
ALT SERPL-CCNC: 8 U/L
ANION GAP SERPL CALC-SCNC: 11 MMOL/L (ref 0–18)
ANTIBODY SCREEN: NEGATIVE
AST SERPL-CCNC: 18 U/L (ref ?–34)
ATRIAL RATE: 66 BPM
BASOPHILS # BLD AUTO: 0.07 X10(3) UL (ref 0–0.2)
BASOPHILS NFR BLD AUTO: 1 %
BILIRUB SERPL-MCNC: 0.8 MG/DL (ref 0.2–1.1)
BILIRUB UR QL STRIP.AUTO: NEGATIVE
BUN BLD-MCNC: 32 MG/DL (ref 9–23)
CALCIUM BLD-MCNC: 10 MG/DL (ref 8.7–10.4)
CHLORIDE SERPL-SCNC: 111 MMOL/L (ref 98–112)
CHOLEST SERPL-MCNC: 155 MG/DL (ref ?–200)
CLARITY UR REFRACT.AUTO: CLEAR
CO2 SERPL-SCNC: 22 MMOL/L (ref 21–32)
COLOR UR AUTO: YELLOW
CREAT BLD-MCNC: 1.57 MG/DL
CREAT UR-SCNC: 199.8 MG/DL
EGFRCR SERPLBLD CKD-EPI 2021: 36 ML/MIN/1.73M2 (ref 60–?)
EOSINOPHIL # BLD AUTO: 0.13 X10(3) UL (ref 0–0.7)
EOSINOPHIL NFR BLD AUTO: 1.9 %
ERYTHROCYTE [DISTWIDTH] IN BLOOD BY AUTOMATED COUNT: 13 %
FASTING PATIENT LIPID ANSWER: YES
FASTING STATUS PATIENT QL REPORTED: YES
GLOBULIN PLAS-MCNC: 2.4 G/DL (ref 2–3.5)
GLUCOSE BLD-MCNC: 110 MG/DL (ref 70–99)
GLUCOSE UR STRIP.AUTO-MCNC: NORMAL MG/DL
HCT VFR BLD AUTO: 35.5 %
HDLC SERPL-MCNC: 52 MG/DL (ref 40–59)
HGB BLD-MCNC: 11.1 G/DL
HYALINE CASTS #/AREA URNS AUTO: PRESENT /LPF
IMM GRANULOCYTES # BLD AUTO: 0.01 X10(3) UL (ref 0–1)
IMM GRANULOCYTES NFR BLD: 0.1 %
KETONES UR STRIP.AUTO-MCNC: NEGATIVE MG/DL
LDLC SERPL CALC-MCNC: 86 MG/DL (ref ?–100)
LEUKOCYTE ESTERASE UR QL STRIP.AUTO: 75
LYMPHOCYTES # BLD AUTO: 1.99 X10(3) UL (ref 1–4)
LYMPHOCYTES NFR BLD AUTO: 29 %
MCH RBC QN AUTO: 29.7 PG (ref 26–34)
MCHC RBC AUTO-ENTMCNC: 31.3 G/DL (ref 31–37)
MCV RBC AUTO: 94.9 FL
MICROALBUMIN UR-MCNC: 6.7 MG/DL
MICROALBUMIN/CREAT 24H UR-RTO: 33.5 UG/MG (ref ?–30)
MONOCYTES # BLD AUTO: 0.68 X10(3) UL (ref 0.1–1)
MONOCYTES NFR BLD AUTO: 9.9 %
NEUTROPHILS # BLD AUTO: 3.99 X10 (3) UL (ref 1.5–7.7)
NEUTROPHILS # BLD AUTO: 3.99 X10(3) UL (ref 1.5–7.7)
NEUTROPHILS NFR BLD AUTO: 58.1 %
NITRITE UR QL STRIP.AUTO: NEGATIVE
NONHDLC SERPL-MCNC: 103 MG/DL (ref ?–130)
OSMOLALITY SERPL CALC.SUM OF ELEC: 306 MOSM/KG (ref 275–295)
P AXIS: 45 DEGREES
P-R INTERVAL: 196 MS
PH UR STRIP.AUTO: 5.5 [PH] (ref 5–8)
PLATELET # BLD AUTO: 276 10(3)UL (ref 150–450)
POTASSIUM SERPL-SCNC: 4.3 MMOL/L (ref 3.5–5.1)
PROT SERPL-MCNC: 6.7 G/DL (ref 5.7–8.2)
PROT UR STRIP.AUTO-MCNC: 20 MG/DL
Q-T INTERVAL: 398 MS
QRS DURATION: 76 MS
QTC CALCULATION (BEZET): 417 MS
R AXIS: -10 DEGREES
RBC # BLD AUTO: 3.74 X10(6)UL
RH BLOOD TYPE: POSITIVE
SODIUM SERPL-SCNC: 144 MMOL/L (ref 136–145)
SP GR UR STRIP.AUTO: 1.02 (ref 1–1.03)
T AXIS: 16 DEGREES
TRIGL SERPL-MCNC: 90 MG/DL (ref 30–149)
UROBILINOGEN UR STRIP.AUTO-MCNC: NORMAL MG/DL
VENTRICULAR RATE: 66 BPM
VLDLC SERPL CALC-MCNC: 14 MG/DL (ref 0–30)
WBC # BLD AUTO: 6.9 X10(3) UL (ref 4–11)

## 2024-12-16 PROCEDURE — 86850 RBC ANTIBODY SCREEN: CPT

## 2024-12-16 PROCEDURE — 83036 HEMOGLOBIN GLYCOSYLATED A1C: CPT

## 2024-12-16 PROCEDURE — 87081 CULTURE SCREEN ONLY: CPT

## 2024-12-16 PROCEDURE — 80158 DRUG ASSAY CYCLOSPORINE: CPT

## 2024-12-16 PROCEDURE — 82043 UR ALBUMIN QUANTITATIVE: CPT

## 2024-12-16 PROCEDURE — 80053 COMPREHEN METABOLIC PANEL: CPT

## 2024-12-16 PROCEDURE — 80061 LIPID PANEL: CPT

## 2024-12-16 PROCEDURE — 86900 BLOOD TYPING SEROLOGIC ABO: CPT

## 2024-12-16 PROCEDURE — 85025 COMPLETE CBC W/AUTO DIFF WBC: CPT

## 2024-12-16 PROCEDURE — 81001 URINALYSIS AUTO W/SCOPE: CPT

## 2024-12-16 PROCEDURE — 86901 BLOOD TYPING SEROLOGIC RH(D): CPT

## 2024-12-16 PROCEDURE — 93010 ELECTROCARDIOGRAM REPORT: CPT | Performed by: INTERNAL MEDICINE

## 2024-12-16 PROCEDURE — 82570 ASSAY OF URINE CREATININE: CPT

## 2024-12-16 PROCEDURE — 36415 COLL VENOUS BLD VENIPUNCTURE: CPT

## 2024-12-16 PROCEDURE — 93005 ELECTROCARDIOGRAM TRACING: CPT

## 2024-12-17 DIAGNOSIS — I10 ESSENTIAL HYPERTENSION: ICD-10-CM

## 2024-12-17 DIAGNOSIS — N18.30 STAGE 3 CHRONIC KIDNEY DISEASE, UNSPECIFIED WHETHER STAGE 3A OR 3B CKD (HCC): ICD-10-CM

## 2024-12-17 DIAGNOSIS — Z94.0 RENAL TRANSPLANT RECIPIENT (HCC): ICD-10-CM

## 2024-12-17 RX ORDER — LOSARTAN POTASSIUM 50 MG/1
50 TABLET ORAL DAILY
Qty: 100 TABLET | Refills: 2 | Status: SHIPPED | OUTPATIENT
Start: 2024-12-17

## 2024-12-17 RX ORDER — CYCLOSPORINE 25 MG/1
CAPSULE, GELATIN COATED ORAL
Qty: 500 CAPSULE | Refills: 2 | Status: SHIPPED | OUTPATIENT
Start: 2024-12-17

## 2024-12-18 ENCOUNTER — OFFICE VISIT (OUTPATIENT)
Dept: FAMILY MEDICINE CLINIC | Facility: CLINIC | Age: 64
End: 2024-12-18
Payer: COMMERCIAL

## 2024-12-18 ENCOUNTER — LABORATORY ENCOUNTER (OUTPATIENT)
Dept: LAB | Age: 64
End: 2024-12-18
Attending: INTERNAL MEDICINE
Payer: MEDICARE

## 2024-12-18 VITALS
DIASTOLIC BLOOD PRESSURE: 66 MMHG | OXYGEN SATURATION: 99 % | BODY MASS INDEX: 34 KG/M2 | WEIGHT: 181.38 LBS | HEART RATE: 102 BPM | TEMPERATURE: 97 F | SYSTOLIC BLOOD PRESSURE: 124 MMHG | RESPIRATION RATE: 16 BRPM

## 2024-12-18 DIAGNOSIS — M17.11 PRIMARY OSTEOARTHRITIS OF RIGHT KNEE: ICD-10-CM

## 2024-12-18 DIAGNOSIS — E78.5 DYSLIPIDEMIA ASSOCIATED WITH TYPE 2 DIABETES MELLITUS (HCC): ICD-10-CM

## 2024-12-18 DIAGNOSIS — E11.9 TYPE 2 DIABETES MELLITUS WITHOUT COMPLICATION, WITHOUT LONG-TERM CURRENT USE OF INSULIN (HCC): Primary | ICD-10-CM

## 2024-12-18 DIAGNOSIS — N18.30 DIABETES MELLITUS WITH STAGE 3 CHRONIC KIDNEY DISEASE (HCC): ICD-10-CM

## 2024-12-18 DIAGNOSIS — E11.69 DYSLIPIDEMIA ASSOCIATED WITH TYPE 2 DIABETES MELLITUS (HCC): ICD-10-CM

## 2024-12-18 DIAGNOSIS — Z01.818 PREOP EXAMINATION: Primary | ICD-10-CM

## 2024-12-18 DIAGNOSIS — I10 ESSENTIAL HYPERTENSION WITH GOAL BLOOD PRESSURE LESS THAN 140/90: ICD-10-CM

## 2024-12-18 DIAGNOSIS — Z94.0 RENAL TRANSPLANT RECIPIENT (HCC): ICD-10-CM

## 2024-12-18 DIAGNOSIS — N18.30 STAGE 3 CHRONIC KIDNEY DISEASE, UNSPECIFIED WHETHER STAGE 3A OR 3B CKD (HCC): ICD-10-CM

## 2024-12-18 DIAGNOSIS — I10 ESSENTIAL HYPERTENSION: ICD-10-CM

## 2024-12-18 DIAGNOSIS — Z94.0 HISTORY OF RENAL TRANSPLANTATION (HCC): ICD-10-CM

## 2024-12-18 DIAGNOSIS — E11.22 DIABETES MELLITUS WITH STAGE 3 CHRONIC KIDNEY DISEASE (HCC): ICD-10-CM

## 2024-12-18 LAB
CYCLOSPORINE -MS/MS: 69 NG/ML
EST. AVERAGE GLUCOSE BLD GHB EST-MCNC: 143 MG/DL (ref 68–126)
HBA1C MFR BLD: 6.6 % (ref ?–5.7)

## 2024-12-18 PROCEDURE — 3074F SYST BP LT 130 MM HG: CPT | Performed by: FAMILY MEDICINE

## 2024-12-18 PROCEDURE — 3044F HG A1C LEVEL LT 7.0%: CPT | Performed by: FAMILY MEDICINE

## 2024-12-18 PROCEDURE — 3078F DIAST BP <80 MM HG: CPT | Performed by: FAMILY MEDICINE

## 2024-12-18 PROCEDURE — 1159F MED LIST DOCD IN RCRD: CPT | Performed by: FAMILY MEDICINE

## 2024-12-18 PROCEDURE — 3060F POS MICROALBUMINURIA REV: CPT | Performed by: FAMILY MEDICINE

## 2024-12-18 PROCEDURE — 99214 OFFICE O/P EST MOD 30 MIN: CPT | Performed by: FAMILY MEDICINE

## 2024-12-18 PROCEDURE — G2211 COMPLEX E/M VISIT ADD ON: HCPCS | Performed by: FAMILY MEDICINE

## 2024-12-18 PROCEDURE — 3061F NEG MICROALBUMINURIA REV: CPT | Performed by: FAMILY MEDICINE

## 2024-12-18 RX ORDER — PREDNISONE 5 MG/1
5 TABLET ORAL DAILY
Qty: 100 TABLET | Refills: 0 | OUTPATIENT
Start: 2024-12-18

## 2024-12-18 NOTE — PROGRESS NOTES
Brinda Max is a 64 year old female who presents for a pre-operative physical exam. Patient is to have A RIGHT TOTAL KNEE REPLACEMENT. TO BE DONE BY DR. ADRIANNA KUMAR AT [facility] ON 1/6/25   HPI:   Pt complains of HAVING persistent right knee pain and swelling.has failed all conservative therapy PT, NSAIDS, bracing etc. Imagine reveals severe arthritis of the right knee with bone on bone.  Current Outpatient Medications   Medication Sig Dispense Refill    CYCLOSPORINE NON-MODIFIED 25 MG Oral Cap TAKE 3 CAPSULES BY MOUTH EVERY  MORNING AND 2 CAPSULES EVERY  EVENING 500 capsule 2    LOSARTAN 50 MG Oral Tab TAKE 1 TABLET BY MOUTH DAILY 100 tablet 2    predniSONE 5 MG Oral Tab Take 1 tablet (5 mg total) by mouth daily. 90 tablet 1    Mycophenolate Mofetil 500 MG Oral Tab Take 2 tablets (1,000 mg total) by mouth 2 (two) times daily. 360 tablet 0    ASPIRIN LOW DOSE 81 MG Oral Tab EC Take 1 tablet (81 mg total) by mouth daily.      cycloSPORINE non-modified 25 MG Oral Cap Take 3 capsules (75 mg total) by mouth every morning.      cycloSPORINE non-modified 25 MG Oral Cap Take 2 capsules (50 mg total) by mouth every evening.      ATORVASTATIN 20 MG Oral Tab TAKE 1 TABLET BY MOUTH DAILY 100 tablet 2    Glucose Blood In Vitro Strip 1 each by Other route daily. 90 each 3    magnesium oxide 400 MG Oral Tab Take 1 tablet (400 mg total) by mouth 2 (two) times daily.      multivitamin Oral Tab Take 1 tablet by mouth daily.      Cinnamon 500 MG Oral Cap Take 1,000 mg by mouth 2 (two) times daily.      Omega 3 1000 MG Oral Cap Take 2,000 mg by mouth 2 (two) times daily.      Cholecalciferol (VITAMIN D) 1000 UNITS Oral Cap Take 1 capsule by mouth 2 (two) times daily.        Allergies: Allergies[1]   Past Medical History:    Arthritis    Knees bone on bone    Back problem    Cancer (HCC)    SKIN CANCER    Easy bruising    ASA 81 mg daily    Glomerulonephritis    Hemorrhoids    Pregnancy    High blood pressure    History of  COVID-19    fatigue. not hospitalized. no continued symptoms    Kidney failure    Transplant 4/25/02    Osteoarthritis    Other and unspecified hyperlipidemia    Pain in joints    Renal disorder    kidney transplant    Type II or unspecified type diabetes mellitus without mention of complication, not stated as uncontrolled    Unspecified essential hypertension    Visual impairment    glasses / contacts    Wears glasses      Past Surgical History:   Procedure Laterality Date    Colonoscopy  2013, 2017    Organ transplant  2002    Kidney    Other surgical history  age 42    Renal transplant    Tonsillectomy  1964    Transplantation of kidney      4/25/02 at [place]      Family History   Problem Relation Age of Onset    Other (Other) Mother         alzheimers    Diabetes Mother         Type 1    Other Mother         Parkinsons    Other (Other) Self 54        basal skin cancer      Social History:   Social History     Socioeconomic History    Marital status:    Tobacco Use    Smoking status: Never    Smokeless tobacco: Never   Vaping Use    Vaping status: Never Used   Substance and Sexual Activity    Alcohol use: Yes     Comment: occasional    Drug use: No   Other Topics Concern    Caffeine Concern Yes    Exercise No     Social Drivers of Health     Financial Resource Strain: Low Risk  (7/15/2024)    Financial Resource Strain     Difficulty of Paying Living Expenses: Not hard at all     Med Affordability: No   Food Insecurity: No Food Insecurity (7/11/2024)    Food Insecurity     Food Insecurity: Never true   Transportation Needs: No Transportation Needs (7/11/2024)    Transportation Needs     Lack of Transportation: No    [facility], [facility]    Social Connections   Housing Stability: Low Risk  (7/11/2024)    Housing Stability     Housing Instability: No      Occ: retired. : . Children: 4.   Exercise: minimal.  Diet: watches minimally      REVIEW OF SYSTEMS:   GENERAL: feels well otherwise  SKIN: denies any unusual skin lesions  EYES:denies blurred vision or double vision  HEENT: denies nasal congestion, sinus pain or ST  LUNGS: denies shortness of breath with exertion  CARDIOVASCULAR: denies chest pain on exertion  GI: denies abdominal pain,denies heartburn  : denies dysuria, vaginal discharge or itching,periods regular denies nocturia or changes in stream  MUSCULOSKELETAL: denies back pain, osteoarthritis right knee, knee pain and swelling  NEURO: denies headaches  PSYCHE: denies depression or anxiety  HEMATOLOGIC: denies hx of anemia  ENDOCRINE: denies thyroid history, under good control last Hemoglobin A1c 6.6  ALL/ASTHMA: denies hx of allergy or asthma    EXAM:   /66   Pulse 102   Temp 97 °F (36.1 °C) (Temporal)   Resp 16   Wt 181 lb 6 oz (82.3 kg)   SpO2 99%   BMI 34.27 kg/m²   GENERAL: well developed, well nourished,in no apparent distress  SKIN: no rashes,no suspicious lesions  HEENT: atraumatic, normocephalic,ears and throat are clear  EYES:PERRLA, EOMI, normal optic disk,conjunctiva are clear  NECK: supple,no adenopathy,no bruits  CHEST: no chest tenderness  LUNGS: clear to auscultation  CARDIO: RRR without murmur  GI: good BS's,no masses, HSM or tenderness  MUSCULOSKELETAL: back is not tender,FROM of the back  EXTREMITIES: no cyanosis, clubbing has bilateral LE nonpitting edema, crepitance with flexin/extension of the right knee, with limited flexion  NEURO: Oriented times three,cranial nerves are intact,motor and sensory are grossly intact    ASSESSMENT AND PLAN:     Encounter Diagnoses   Name Primary?    Preop examination Yes    Primary osteoarthritis of right knee     Diabetes mellitus with stage 3 chronic kidney disease (HCC)      Brinda Max is a 64 year old female who presents for a pre-operative physical exam. Patient is to have Patient is to have A RIGHT TOTAL KNEE REPLACEMENT. TO BE DONE BY DR. ADRIANNA KUMAR  AT [facility] ON 1/6/25  Pt has no significant history of cardiac or pulmonary conditions. Pt is a good surgical candidate. This consult was sent back the referring physician, Dr. ISRAEL KUMAR.  REVIEWED HE LABS, COMPLETE BLOOD COUNT SHOWS MILD ANEMIA LIKELY DUE TO RENAL DISEASE. BUN ELEVATED DUE TO INSUFFICIENT HYDRATION, GFR STABLE, EKG WAS NORMAL.  MEDICALLY CLEAR FOR SURGERY         [1]   Allergies  Allergen Reactions    Adhesive Tape OTHER (SEE COMMENTS)     Caused redness after being on for a length of time    Ace Inhibitors Coughing

## 2024-12-18 NOTE — H&P (VIEW-ONLY)
Brinda Max is a 64 year old female who presents for a pre-operative physical exam. Patient is to have A RIGHT TOTAL KNEE REPLACEMENT. TO BE DONE BY DR. ADRIANNA KUMAR AT [facility] ON 1/6/25   HPI:   Pt complains of HAVING persistent right knee pain and swelling.has failed all conservative therapy PT, NSAIDS, bracing etc. Imagine reveals severe arthritis of the right knee with bone on bone.  Current Outpatient Medications   Medication Sig Dispense Refill    CYCLOSPORINE NON-MODIFIED 25 MG Oral Cap TAKE 3 CAPSULES BY MOUTH EVERY  MORNING AND 2 CAPSULES EVERY  EVENING 500 capsule 2    LOSARTAN 50 MG Oral Tab TAKE 1 TABLET BY MOUTH DAILY 100 tablet 2    predniSONE 5 MG Oral Tab Take 1 tablet (5 mg total) by mouth daily. 90 tablet 1    Mycophenolate Mofetil 500 MG Oral Tab Take 2 tablets (1,000 mg total) by mouth 2 (two) times daily. 360 tablet 0    ASPIRIN LOW DOSE 81 MG Oral Tab EC Take 1 tablet (81 mg total) by mouth daily.      cycloSPORINE non-modified 25 MG Oral Cap Take 3 capsules (75 mg total) by mouth every morning.      cycloSPORINE non-modified 25 MG Oral Cap Take 2 capsules (50 mg total) by mouth every evening.      ATORVASTATIN 20 MG Oral Tab TAKE 1 TABLET BY MOUTH DAILY 100 tablet 2    Glucose Blood In Vitro Strip 1 each by Other route daily. 90 each 3    magnesium oxide 400 MG Oral Tab Take 1 tablet (400 mg total) by mouth 2 (two) times daily.      multivitamin Oral Tab Take 1 tablet by mouth daily.      Cinnamon 500 MG Oral Cap Take 1,000 mg by mouth 2 (two) times daily.      Omega 3 1000 MG Oral Cap Take 2,000 mg by mouth 2 (two) times daily.      Cholecalciferol (VITAMIN D) 1000 UNITS Oral Cap Take 1 capsule by mouth 2 (two) times daily.        Allergies: Allergies[1]   Past Medical History:    Arthritis    Knees bone on bone    Back problem    Cancer (HCC)    SKIN CANCER    Easy bruising    ASA 81 mg daily    Glomerulonephritis    Hemorrhoids    Pregnancy    High blood pressure    History of  COVID-19    fatigue. not hospitalized. no continued symptoms    Kidney failure    Transplant 4/25/02    Osteoarthritis    Other and unspecified hyperlipidemia    Pain in joints    Renal disorder    kidney transplant    Type II or unspecified type diabetes mellitus without mention of complication, not stated as uncontrolled    Unspecified essential hypertension    Visual impairment    glasses / contacts    Wears glasses      Past Surgical History:   Procedure Laterality Date    Colonoscopy  2013, 2017    Organ transplant  2002    Kidney    Other surgical history  age 42    Renal transplant    Tonsillectomy  1964    Transplantation of kidney      4/25/02 at [place]      Family History   Problem Relation Age of Onset    Other (Other) Mother         alzheimers    Diabetes Mother         Type 1    Other Mother         Parkinsons    Other (Other) Self 56        basal skin cancer      Social History:   Social History     Socioeconomic History    Marital status:    Tobacco Use    Smoking status: Never    Smokeless tobacco: Never   Vaping Use    Vaping status: Never Used   Substance and Sexual Activity    Alcohol use: Yes     Comment: occasional    Drug use: No   Other Topics Concern    Caffeine Concern Yes    Exercise No     Social Drivers of Health     Financial Resource Strain: Low Risk  (7/15/2024)    Financial Resource Strain     Difficulty of Paying Living Expenses: Not hard at all     Med Affordability: No   Food Insecurity: No Food Insecurity (7/11/2024)    Food Insecurity     Food Insecurity: Never true   Transportation Needs: No Transportation Needs (7/11/2024)    Transportation Needs     Lack of Transportation: No    [facility], [facility]    Social Connections   Housing Stability: Low Risk  (7/11/2024)    Housing Stability     Housing Instability: No      Occ: retired. : . Children: 4.   Exercise: minimal.  Diet: watches minimally      REVIEW OF SYSTEMS:   GENERAL: feels well otherwise  SKIN: denies any unusual skin lesions  EYES:denies blurred vision or double vision  HEENT: denies nasal congestion, sinus pain or ST  LUNGS: denies shortness of breath with exertion  CARDIOVASCULAR: denies chest pain on exertion  GI: denies abdominal pain,denies heartburn  : denies dysuria, vaginal discharge or itching,periods regular denies nocturia or changes in stream  MUSCULOSKELETAL: denies back pain, osteoarthritis right knee, knee pain and swelling  NEURO: denies headaches  PSYCHE: denies depression or anxiety  HEMATOLOGIC: denies hx of anemia  ENDOCRINE: denies thyroid history, under good control last Hemoglobin A1c 6.6  ALL/ASTHMA: denies hx of allergy or asthma    EXAM:   /66   Pulse 102   Temp 97 °F (36.1 °C) (Temporal)   Resp 16   Wt 181 lb 6 oz (82.3 kg)   SpO2 99%   BMI 34.27 kg/m²   GENERAL: well developed, well nourished,in no apparent distress  SKIN: no rashes,no suspicious lesions  HEENT: atraumatic, normocephalic,ears and throat are clear  EYES:PERRLA, EOMI, normal optic disk,conjunctiva are clear  NECK: supple,no adenopathy,no bruits  CHEST: no chest tenderness  LUNGS: clear to auscultation  CARDIO: RRR without murmur  GI: good BS's,no masses, HSM or tenderness  MUSCULOSKELETAL: back is not tender,FROM of the back  EXTREMITIES: no cyanosis, clubbing has bilateral LE nonpitting edema, crepitance with flexin/extension of the right knee, with limited flexion  NEURO: Oriented times three,cranial nerves are intact,motor and sensory are grossly intact    ASSESSMENT AND PLAN:     Encounter Diagnoses   Name Primary?    Preop examination Yes    Primary osteoarthritis of right knee     Diabetes mellitus with stage 3 chronic kidney disease (HCC)      Brinda Max is a 64 year old female who presents for a pre-operative physical exam. Patient is to have Patient is to have A RIGHT TOTAL KNEE REPLACEMENT. TO BE DONE BY DR. ADRIANNA UKMAR  AT [facility] ON 1/6/25  Pt has no significant history of cardiac or pulmonary conditions. Pt is a good surgical candidate. This consult was sent back the referring physician, Dr. ISRAEL KUMAR.  REVIEWED HE LABS, COMPLETE BLOOD COUNT SHOWS MILD ANEMIA LIKELY DUE TO RENAL DISEASE. BUN ELEVATED DUE TO INSUFFICIENT HYDRATION, GFR STABLE, EKG WAS NORMAL.  MEDICALLY CLEAR FOR SURGERY         [1]   Allergies  Allergen Reactions    Adhesive Tape OTHER (SEE COMMENTS)     Caused redness after being on for a length of time    Ace Inhibitors Coughing

## 2024-12-23 ENCOUNTER — OFFICE VISIT (OUTPATIENT)
Dept: NEPHROLOGY | Facility: CLINIC | Age: 64
End: 2024-12-23
Payer: COMMERCIAL

## 2024-12-23 VITALS — SYSTOLIC BLOOD PRESSURE: 130 MMHG | DIASTOLIC BLOOD PRESSURE: 86 MMHG | WEIGHT: 179 LBS | BODY MASS INDEX: 34 KG/M2

## 2024-12-23 DIAGNOSIS — Z94.0 RENAL TRANSPLANT RECIPIENT (HCC): ICD-10-CM

## 2024-12-23 DIAGNOSIS — N18.31 CKD STAGE 3A, GFR 45-59 ML/MIN (HCC): Primary | ICD-10-CM

## 2024-12-23 DIAGNOSIS — I10 ESSENTIAL HYPERTENSION: ICD-10-CM

## 2024-12-23 PROCEDURE — 99214 OFFICE O/P EST MOD 30 MIN: CPT | Performed by: INTERNAL MEDICINE

## 2024-12-23 NOTE — H&P (VIEW-ONLY)
Nephrology Progress Note      Brinda Max is a 64 year old female.    HPI:     Chief Complaint   Patient presents with    Renal Transplant    Chronic Kidney Disease    Hypertension       Brinda Max was seen in the nephrology clinic today in follow-up for management of chronic kidney disease stage IIIa in the setting of a history of living unrelated renal transplant performed in 2002 at [facility] ( served as a donor).  Since her last clinic visit she reports that she did not undergo previously planned right knee replacement surgery as she developed significant back issues and ultimately underwent spinal fusion surgery.  That being said she does report that she is having right total knee replacement performed on January 6.  She is otherwise feeling well and the review of systems is unremarkable.      HISTORY:  Past Medical History:    Arthritis    Knees bone on bone    Back problem    Cancer (HCC)    SKIN CANCER    Easy bruising    ASA 81 mg daily    Glomerulonephritis    Hemorrhoids    Pregnancy    High blood pressure    History of COVID-19    fatigue. not hospitalized. no continued symptoms    Kidney failure    Transplant 4/25/02    Osteoarthritis    Other and unspecified hyperlipidemia    Pain in joints    Renal disorder    kidney transplant    Type II or unspecified type diabetes mellitus without mention of complication, not stated as uncontrolled    Unspecified essential hypertension    Visual impairment    glasses / contacts    Wears glasses      Past Surgical History:   Procedure Laterality Date    Colonoscopy  2013, 2017    Organ transplant  2002    Kidney    Other surgical history  age 42    Renal transplant    Tonsillectomy  1964    Transplantation of kidney      4/25/02 at [place]      Family History   Problem Relation Age of Onset    Other (Other) Mother         alzheimers    Diabetes Mother         Type 1    Other Mother         Parkinsons    Other (Other) Self 54         basal skin cancer      Social History:   Social History     Socioeconomic History    Marital status:    Tobacco Use    Smoking status: Never    Smokeless tobacco: Never   Vaping Use    Vaping status: Never Used   Substance and Sexual Activity    Alcohol use: Yes     Comment: occasional    Drug use: No   Other Topics Concern    Caffeine Concern Yes    Exercise No     Social Drivers of Health     Financial Resource Strain: Low Risk  (7/15/2024)    Financial Resource Strain     Difficulty of Paying Living Expenses: Not hard at all     Med Affordability: No   Food Insecurity: No Food Insecurity (7/11/2024)    Food Insecurity     Food Insecurity: Never true   Transportation Needs: No Transportation Needs (7/11/2024)    Transportation Needs     Lack of Transportation: No    [facility]    Social Connections   Housing Stability: Low Risk  (7/11/2024)    Housing Stability     Housing Instability: No        Medications (Active prior to today's visit):  Current Outpatient Medications   Medication Sig Dispense Refill    CYCLOSPORINE NON-MODIFIED 25 MG Oral Cap TAKE 3 CAPSULES BY MOUTH EVERY  MORNING AND 2 CAPSULES EVERY  EVENING 500 capsule 2    LOSARTAN 50 MG Oral Tab TAKE 1 TABLET BY MOUTH DAILY 100 tablet 2    predniSONE 5 MG Oral Tab Take 1 tablet (5 mg total) by mouth daily. 90 tablet 1    Mycophenolate Mofetil 500 MG Oral Tab Take 2 tablets (1,000 mg total) by mouth 2 (two) times daily. 360 tablet 0    ASPIRIN LOW DOSE 81 MG Oral Tab EC Take 1 tablet (81 mg total) by mouth daily.      cycloSPORINE non-modified 25 MG Oral Cap Take 3 capsules (75 mg total) by mouth every morning.      cycloSPORINE non-modified 25 MG Oral Cap Take 2 capsules (50 mg total) by mouth every evening.      ATORVASTATIN 20 MG Oral Tab TAKE 1 TABLET BY MOUTH DAILY 100 tablet 2    Glucose Blood In Vitro Strip 1 each by Other route daily. 90 each 3    magnesium oxide 400 MG  Oral Tab Take 1 tablet (400 mg total) by mouth 2 (two) times daily.      multivitamin Oral Tab Take 1 tablet by mouth daily.      Cinnamon 500 MG Oral Cap Take 1,000 mg by mouth 2 (two) times daily.      Omega 3 1000 MG Oral Cap Take 2,000 mg by mouth 2 (two) times daily.      Cholecalciferol (VITAMIN D) 1000 UNITS Oral Cap Take 1 capsule by mouth 2 (two) times daily.         Allergies:  Allergies[1]      ROS:     Denies fever/chills  Denies wt loss/gain  Denies HA or visual changes  Denies CP or palpitations  Denies SOB/cough/hemoptysis  Denies abd or flank pain  Denies N/V/D  Denies change in urinary habits or gross hematuria  Denies LE edema  + Right knee pain      PHYSICAL EXAM:   There were no vitals taken for this visit.  Wt Readings from Last 6 Encounters:   12/18/24 181 lb 6 oz (82.3 kg)   12/09/24 174 lb (78.9 kg)   10/02/24 174 lb (78.9 kg)   08/15/24 174 lb (78.9 kg)   07/25/24 176 lb 6 oz (80 kg)   07/19/24 179 lb (81.2 kg)       General: Alert and oriented in no apparent distress.  HEENT: No scleral icterus, MMM  Neck: Supple, no ANDRES or thyromegaly  Cardiac: Regular rate and rhythm, S1, S2 normal, no murmur or rub  Lungs: Clear without wheezes, rales, rhonchi.    Extremities: Without clubbing, cyanosis or edema.  Neurologic: Alert and oriented, normal affect, cranial nerves grossly intact, moving all extremities  Skin: Warm and dry, no rashes      LABS:     Lab Results   Component Value Date    BUN 32 (H) 12/16/2024    BUNCREA 20.4 (H) 07/14/2021    CREATSERUM 1.57 (H) 12/16/2024    ANIONGAP 11 12/16/2024    GFR 37 (L) 12/29/2017    GFRNAA 40 (L) 07/07/2022    GFRAA 46 (L) 07/07/2022    CA 10.0 12/16/2024    OSMOCALC 306 (H) 12/16/2024    ALKPHO 108 12/16/2024    AST 18 12/16/2024    ALT 8 (L) 12/16/2024    BILT 0.8 12/16/2024    TP 6.7 12/16/2024    ALB 4.3 12/16/2024    GLOBULIN 2.4 12/16/2024     12/16/2024    K 4.3 12/16/2024     12/16/2024    CO2 22.0 12/16/2024     Lab Results    Component Value Date    RBC 3.74 (L) 12/16/2024    HGB 11.1 (L) 12/16/2024    HCT 35.5 12/16/2024    .0 12/16/2024    MPV 11.8 12/14/2012    MCV 94.9 12/16/2024    MCH 29.7 12/16/2024    MCHC 31.3 12/16/2024    RDW 13.0 12/16/2024    NEPRELIM 3.99 12/16/2024    NEPERCENT 58.1 12/16/2024    LYPERCENT 29.0 12/16/2024    MOPERCENT 9.9 12/16/2024    EOPERCENT 1.9 12/16/2024    BAPERCENT 1.0 12/16/2024    NE 3.99 12/16/2024    LYMABS 1.99 12/16/2024    MOABSO 0.68 12/16/2024    EOABSO 0.13 12/16/2024    BAABSO 0.07 12/16/2024     Lab Results   Component Value Date    CREUR 199.80 12/16/2024    MALBCRECALC 17.4 04/03/2013     Lab Results   Component Value Date    CLARITY Clear 12/16/2024    SPECGRAVITY 1.021 12/16/2024    GLUUR Normal 12/16/2024    BILUR Negative 12/16/2024    KETUR Negative 12/16/2024    BLOODURINE 1+ (A) 12/16/2024    PHURINE 5.5 12/16/2024    PROUR 20 (A) 12/16/2024    UROBILINOGEN Normal 12/16/2024    NITRITE Negative 12/16/2024    LEUUR 75 (A) 12/16/2024    NMIC NOT INDICATED 10/12/2012    WBCUR 1-5 12/16/2024    RBCUR 6-10 (A) 12/16/2024    EPIUR Few (A) 12/16/2024    BACUR None Seen 12/16/2024    CAOXUR Many (A) 12/06/2021    HYLUR Present (A) 12/16/2024       Cyclosporine level 69      ASSESSMENT/PLAN:     #1.  Chronic kidney disease stage IIIa-she has longstanding chronic kidney disease since her transplant and, fortunately, remains quite stable overall.  Serum creatinines do fluctuate generally between 1.3 and 1.6 mg/dL or so and she is stable in this regard.  The mainstay of therapy continues to remain good blood pressure control including the use of medications which block the renin-angiotensin system.  Blood pressure remains excellent and she is on losartan    #2.  Renal transplant recipient-she did receive a living unrelated renal transplant from her  in 2002 at [facility].  Immunosuppression consists of Sandimmune 75 mg in the evening, CellCept 1000 mg  twice daily and prednisone 5 mg daily.    #3.  Hypertension-again blood pressure is stable and she will continue her current antihypertensive regimen.      #4.  Right knee pain-she is scheduled for right total knee arthroplasty on January 6, 2025.  There is no contraindication to the surgery from a nephrology standpoint.      Thank you again for allowing me to participate in care of your patient.  Please do not hesitate to call any question concerns.      Wicho Espinoza MD  12/23/2024  9:19 AM             [1]   Allergies  Allergen Reactions    Adhesive Tape OTHER (SEE COMMENTS)     Caused redness after being on for a length of time    Ace Inhibitors Coughing

## 2024-12-23 NOTE — PROGRESS NOTES
Nephrology Progress Note      Brinda Max is a 64 year old female.    HPI:     Chief Complaint   Patient presents with    Renal Transplant    Chronic Kidney Disease    Hypertension       Brinda Max was seen in the nephrology clinic today in follow-up for management of chronic kidney disease stage IIIa in the setting of a history of living unrelated renal transplant performed in 2002 at [facility] ( served as a donor).  Since her last clinic visit she reports that she did not undergo previously planned right knee replacement surgery as she developed significant back issues and ultimately underwent spinal fusion surgery.  That being said she does report that she is having right total knee replacement performed on January 6.  She is otherwise feeling well and the review of systems is unremarkable.      HISTORY:  Past Medical History:    Arthritis    Knees bone on bone    Back problem    Cancer (HCC)    SKIN CANCER    Easy bruising    ASA 81 mg daily    Glomerulonephritis    Hemorrhoids    Pregnancy    High blood pressure    History of COVID-19    fatigue. not hospitalized. no continued symptoms    Kidney failure    Transplant 4/25/02    Osteoarthritis    Other and unspecified hyperlipidemia    Pain in joints    Renal disorder    kidney transplant    Type II or unspecified type diabetes mellitus without mention of complication, not stated as uncontrolled    Unspecified essential hypertension    Visual impairment    glasses / contacts    Wears glasses      Past Surgical History:   Procedure Laterality Date    Colonoscopy  2013, 2017    Organ transplant  2002    Kidney    Other surgical history  age 42    Renal transplant    Tonsillectomy  1964    Transplantation of kidney      4/25/02 at [place]      Family History   Problem Relation Age of Onset    Other (Other) Mother         alzheimers    Diabetes Mother         Type 1    Other Mother         Parkinsons    Other (Other) Self 56         basal skin cancer      Social History:   Social History     Socioeconomic History    Marital status:    Tobacco Use    Smoking status: Never    Smokeless tobacco: Never   Vaping Use    Vaping status: Never Used   Substance and Sexual Activity    Alcohol use: Yes     Comment: occasional    Drug use: No   Other Topics Concern    Caffeine Concern Yes    Exercise No     Social Drivers of Health     Financial Resource Strain: Low Risk  (7/15/2024)    Financial Resource Strain     Difficulty of Paying Living Expenses: Not hard at all     Med Affordability: No   Food Insecurity: No Food Insecurity (7/11/2024)    Food Insecurity     Food Insecurity: Never true   Transportation Needs: No Transportation Needs (7/11/2024)    Transportation Needs     Lack of Transportation: No    [facility]    Social Connections   Housing Stability: Low Risk  (7/11/2024)    Housing Stability     Housing Instability: No        Medications (Active prior to today's visit):  Current Outpatient Medications   Medication Sig Dispense Refill    CYCLOSPORINE NON-MODIFIED 25 MG Oral Cap TAKE 3 CAPSULES BY MOUTH EVERY  MORNING AND 2 CAPSULES EVERY  EVENING 500 capsule 2    LOSARTAN 50 MG Oral Tab TAKE 1 TABLET BY MOUTH DAILY 100 tablet 2    predniSONE 5 MG Oral Tab Take 1 tablet (5 mg total) by mouth daily. 90 tablet 1    Mycophenolate Mofetil 500 MG Oral Tab Take 2 tablets (1,000 mg total) by mouth 2 (two) times daily. 360 tablet 0    ASPIRIN LOW DOSE 81 MG Oral Tab EC Take 1 tablet (81 mg total) by mouth daily.      cycloSPORINE non-modified 25 MG Oral Cap Take 3 capsules (75 mg total) by mouth every morning.      cycloSPORINE non-modified 25 MG Oral Cap Take 2 capsules (50 mg total) by mouth every evening.      ATORVASTATIN 20 MG Oral Tab TAKE 1 TABLET BY MOUTH DAILY 100 tablet 2    Glucose Blood In Vitro Strip 1 each by Other route daily. 90 each 3    magnesium oxide 400 MG  Oral Tab Take 1 tablet (400 mg total) by mouth 2 (two) times daily.      multivitamin Oral Tab Take 1 tablet by mouth daily.      Cinnamon 500 MG Oral Cap Take 1,000 mg by mouth 2 (two) times daily.      Omega 3 1000 MG Oral Cap Take 2,000 mg by mouth 2 (two) times daily.      Cholecalciferol (VITAMIN D) 1000 UNITS Oral Cap Take 1 capsule by mouth 2 (two) times daily.         Allergies:  Allergies[1]      ROS:     Denies fever/chills  Denies wt loss/gain  Denies HA or visual changes  Denies CP or palpitations  Denies SOB/cough/hemoptysis  Denies abd or flank pain  Denies N/V/D  Denies change in urinary habits or gross hematuria  Denies LE edema  + Right knee pain      PHYSICAL EXAM:   There were no vitals taken for this visit.  Wt Readings from Last 6 Encounters:   12/18/24 181 lb 6 oz (82.3 kg)   12/09/24 174 lb (78.9 kg)   10/02/24 174 lb (78.9 kg)   08/15/24 174 lb (78.9 kg)   07/25/24 176 lb 6 oz (80 kg)   07/19/24 179 lb (81.2 kg)       General: Alert and oriented in no apparent distress.  HEENT: No scleral icterus, MMM  Neck: Supple, no ANDRES or thyromegaly  Cardiac: Regular rate and rhythm, S1, S2 normal, no murmur or rub  Lungs: Clear without wheezes, rales, rhonchi.    Extremities: Without clubbing, cyanosis or edema.  Neurologic: Alert and oriented, normal affect, cranial nerves grossly intact, moving all extremities  Skin: Warm and dry, no rashes      LABS:     Lab Results   Component Value Date    BUN 32 (H) 12/16/2024    BUNCREA 20.4 (H) 07/14/2021    CREATSERUM 1.57 (H) 12/16/2024    ANIONGAP 11 12/16/2024    GFR 37 (L) 12/29/2017    GFRNAA 40 (L) 07/07/2022    GFRAA 46 (L) 07/07/2022    CA 10.0 12/16/2024    OSMOCALC 306 (H) 12/16/2024    ALKPHO 108 12/16/2024    AST 18 12/16/2024    ALT 8 (L) 12/16/2024    BILT 0.8 12/16/2024    TP 6.7 12/16/2024    ALB 4.3 12/16/2024    GLOBULIN 2.4 12/16/2024     12/16/2024    K 4.3 12/16/2024     12/16/2024    CO2 22.0 12/16/2024     Lab Results    Component Value Date    RBC 3.74 (L) 12/16/2024    HGB 11.1 (L) 12/16/2024    HCT 35.5 12/16/2024    .0 12/16/2024    MPV 11.8 12/14/2012    MCV 94.9 12/16/2024    MCH 29.7 12/16/2024    MCHC 31.3 12/16/2024    RDW 13.0 12/16/2024    NEPRELIM 3.99 12/16/2024    NEPERCENT 58.1 12/16/2024    LYPERCENT 29.0 12/16/2024    MOPERCENT 9.9 12/16/2024    EOPERCENT 1.9 12/16/2024    BAPERCENT 1.0 12/16/2024    NE 3.99 12/16/2024    LYMABS 1.99 12/16/2024    MOABSO 0.68 12/16/2024    EOABSO 0.13 12/16/2024    BAABSO 0.07 12/16/2024     Lab Results   Component Value Date    CREUR 199.80 12/16/2024    MALBCRECALC 17.4 04/03/2013     Lab Results   Component Value Date    CLARITY Clear 12/16/2024    SPECGRAVITY 1.021 12/16/2024    GLUUR Normal 12/16/2024    BILUR Negative 12/16/2024    KETUR Negative 12/16/2024    BLOODURINE 1+ (A) 12/16/2024    PHURINE 5.5 12/16/2024    PROUR 20 (A) 12/16/2024    UROBILINOGEN Normal 12/16/2024    NITRITE Negative 12/16/2024    LEUUR 75 (A) 12/16/2024    NMIC NOT INDICATED 10/12/2012    WBCUR 1-5 12/16/2024    RBCUR 6-10 (A) 12/16/2024    EPIUR Few (A) 12/16/2024    BACUR None Seen 12/16/2024    CAOXUR Many (A) 12/06/2021    HYLUR Present (A) 12/16/2024       Cyclosporine level 69      ASSESSMENT/PLAN:     #1.  Chronic kidney disease stage IIIa-she has longstanding chronic kidney disease since her transplant and, fortunately, remains quite stable overall.  Serum creatinines do fluctuate generally between 1.3 and 1.6 mg/dL or so and she is stable in this regard.  The mainstay of therapy continues to remain good blood pressure control including the use of medications which block the renin-angiotensin system.  Blood pressure remains excellent and she is on losartan    #2.  Renal transplant recipient-she did receive a living unrelated renal transplant from her  in 2002 at [facility].  Immunosuppression consists of Sandimmune 75 mg in the evening, CellCept 1000 mg  twice daily and prednisone 5 mg daily.    #3.  Hypertension-again blood pressure is stable and she will continue her current antihypertensive regimen.      #4.  Right knee pain-she is scheduled for right total knee arthroplasty on January 6, 2025.  There is no contraindication to the surgery from a nephrology standpoint.      Thank you again for allowing me to participate in care of your patient.  Please do not hesitate to call any question concerns.      Wicho Espinoza MD  12/23/2024  9:19 AM             [1]   Allergies  Allergen Reactions    Adhesive Tape OTHER (SEE COMMENTS)     Caused redness after being on for a length of time    Ace Inhibitors Coughing

## 2025-01-03 NOTE — DISCHARGE INSTRUCTIONS
Medications:  Bactrim is an antibiotic. Take 1 tab twice daily for 5 days.  Aspirin 81 mg:  take one tab twice daily to prevent blood clot.    Colace is a stool softener.  Please take twice daily.  Extra Strength Tylenol.  Take 2 tabs (1000mg) 4 times daily.  (maximum 4000mg daily)  Tramadol is a pain medication for moderate pain.  Use as needed per instruction.  Oxycodone is a pain medication for severe pain.  Use as needed per instruction  Do not take Tramadol and Oxydone at the same time.  Space out at least one hour.      Home Health Provider  Sometimes managing your health at home requires assistance.  The [facility] team has recognized your preference to use [facility].  They can be reached by phone at [phone].  The fax number for your reference is [phone].. A representative from the home health agency will contact you or your family to schedule your first visit.      Knee Replacement Discharge Instructions    Dear Patient,     [facility] cares about your progress with recovery following your joint replacement surgery.     300 days from your scheduled surgery, [facility] will send you a follow-up survey to help us understand how your surgery impacted your mobility, pain, and overall quality of life. Please make every effort to complete this survey. The information collected from this survey will be used by your physician to track your recovery.     Sincerely,     [facility] [place]      Activity    Bathing  No tub baths, pools, or saunas until cleared by surgeon (about 4-6 weeks because it takes that long for the incision on the skin to heal and be a barrier to prevent infection).  When allowed to shower:    IF AQUACEL - dressing is waterproof and does not require being covered to keep it dry.  Pat dressing and surrounding skin dry after shower              AQUACEL          Gauze dressings are NOT waterproof and REQUIRE being  covered with a waterproof barrier to keep the dressing and the incision dry.  SARAN WRAP, GLAD WRAP, and PRESS N SEAL WORK  REALLY WELL BUT ANY PLASTIC WRAP WILL DO.   Do not wash incision.   Remove entire wrapping and old dressing (if Gauze) after showering. Pat dry if necessary WITH A CLEAN TOWEL and cover incision with dry sterile gauze and paper tape. For other types of dressings, follow surgeon’s orders.                                 GAUZE          Driving  Do not drive until cleared by surgeon. This is usually in four to six weeks after surgery. Discuss at follow-up office visit.   Not allowed while taking narcotic pain medication or muscle relaxants.    Sex  Usually allowed after four to six weeks - check with surgeon at your office visit.    Return to work  Usually allowed after four to six weeks. Discuss specific work activities with your surgeon.    Restrictions  For knee replacement surgery, follow instructions provided by physical therapy.  Do NOT put a pillow under your knee as it may be more difficult to straighten afterwards.    No smoking  Avoid smoking. It is known to cause breathing problems and can decrease the rate of healing.    Incision care/Dressing changes  Wash hands before and after dressing changes.  FOR DRY GAUZE DRESSING:  Change dressing daily using dry sterile gauze and paper tape once Aquacel (waterproof) dressing changed (which is about 7 days after surgery). Continue this until you follow up in the office with your surgeon. Have knee bent when changing dressing for more ease of bending afterwards.  There could be a small amount of redness around the staples or incision; this is normal.  Watch for increased redness, warmth, any odor, increased drainage or opening of the incision. A little clear yellow or blood tinged drainage is normal up to 2 weeks after surgery but it should be less every day until it stops.  Call physician if you notice any concerning changes.  Sutures/staples  will be removed at first office visit (10 days- 3 weeks).                          GAUZE                                                   Medication  Anticoagulants = blood thinners (Xarelto, Eliquis, Lovenox, Coumadin, or Aspirin)  Pill or shot form depending on what your physician orders.   IF placed on Coumadin, you may also need lab work done for monitoring.  You will bleed easier and bruise easier while on these medications.   Usually you will be on a blood thinner for about 2-5 weeks depending what physician orders.  Contact your physician if you have signs of bruising, nose bleeds or blood in your urine. You may consider using electric razors and soft bristle toothbrushes.  Do not take aspirin while taking blood thinners unless ordered by your physician.  Review anticoagulant education information sheet provided.    Discomfort  Surgical discomfort is normal for one to two months.  Have realistic goals and keep a positive outlook.  You may need pain medication regularly (every 4-6 hours) the first 2 weeks and then begin to decrease how often you are taking it.  Take pain medication as prescribed with food, especially before therapy, allowing 30-60 minutes to take effect.  Do not drink alcohol while on pain medication.  Keep pain manageable; pain should not disrupt your sleep or activities like getting out of bed or walking.  As you have less discomfort, decrease the amount of pain medication you take. Use plain Tylenol (acetaminophen) for less severe pain.  Some pain medications have Tylenol (acetaminophen) in them such as Norco and Percocet. Do NOT take Tylenol (acetaminophen) within 4 hours of a dose of these medications.  Apply ice or cold therapy to surgical site for 20 minutes at least four times a day, especially after therapy. Be sure there is a thin cloth barrier between skin and ice or cold therapy.  Change position at least every 45 minutes while awake to avoid stiffness or increased  discomfort.  For knee replacements- may elevate your leg by placing a pillow under entire leg. Do not place pillow only under the knee.  Have realistic goals and keep a positive outlook.  Deep breathing and relaxation techniques and distractions can help!  If you focus on something else, you do not experience the pain the same. Take advantage of everything available to your to help control you discomfort.  Contact physician if discomfort does not respond to pain medication.    Body changes  Constipation is common with the use of narcotics.  Eat fiber rich foods and drink plenty of fluids.  Use stool softeners such as Colace or Senakot while on narcotics, and laxatives such as Miralax or Milk of Magnesia if needed.   An enema or suppository may be needed if above measures do not work.    Prevention of infection and promotion of healing  Good hand washing is important. Everyone should wash their hands or use hand  as soon as they walk in your house-whether they live there or are visiting.  Keep bed linen/clothing freshly laundered.  Do not allow others or pets to touch your incision.  Avoid people that have colds or the flu.  Your surgeon may recommend that you take antibiotics before you undergo any dental or other invasive surgical procedures after your joint replacement. Speak with your physician about this at your post-op office visit.  Eat a balanced diet high in fiber and drink plenty of fluids.   Continue using incentive spirometry because narcotics make you sleepy so you may not take good deep breaths. We do not want you to get pneumonia.     Post op Office visits  Schedule 10 days to 3 weeks after surgery WITH SURGEON’s office.  Additional visits may need to be scheduled. Your physician will discuss this at first post-op office visit.  Schedule outpatient physical therapy per your surgeon’s orders.  Schedule one week follow up after surgery WITH PRIMARY CARE PHYSICIAN; review your medications over  last 6 months. Your body gets stressed by surgery and that stress can affect all your other health issues (such as high blood pressure, diabetes, CHF, afib, and asthma just to name a few).  We don’t want those other health issues to cause you to get readmitted to the hospital; much better for you to catch developing problems and prevent them from becoming larger ones.  VERONIKA HOSE - IF ordered by your surgeon, wear these during the day and off at night.      Notify your surgeon if you notice any  of the following signs  Separation of incision line.  Increased redness, swelling, or warmth of skin around incision.  Increased or foul smelling drainage from incision  Red streaks on skin near incision.  Temperature >101 F.  Increased pain at incision not relieved by pain medication.      Signs of blood clot  Pain, excessive tenderness, redness, or swelling in leg or calf (other than incision site).  (CALL SURGEON)      Go directly to the ER or CALL 911 if  you:  become short of breath  have chest pain  cough up blood  have unexplained anxiety with breathing  Traveling and Handicapped parking  Check with you surgeon when allowed so you don’t set yourself up for greater chance of complications.  If traveling by car, get out to stretch every 2 hours.  This helps prevent stiffness.  You may need to do this any time you travel for the first year after surgery.  If traveling by plane, BEFORE you get into a security line, let them know that you had your hip replaced, as you will most likely set off the metal detector. The doctors no longer provide an identification card for this as they are easily copied. ALSO request a wheelchair the first year to board and get off a plane…this aids in priority seating and you should sit on the aisle or at the bulkhead where you can easily stretch your legs and get up to walk up and down the aisles…this helps prevent blood clots and stiffness.  TEMPORARY HANDICAP PARKING APPLICATION  (good for  3-6 months)  - At Surgeon or PCP visit, request they fill out the form, then go to DM (only time you do not wait in a long line there). Some [facility] offices provide the same service. ([place] have this service; if you live in another [facility], you may check with them as well). You need space to open car doors to position yourself properly with walker to get in and out of your car safely; some parking spaces are  practically on top of each other and do not give you enough room.    SPECIAL  INSTRUCTIONS:  Spanda- knee replacement (single layer compression sleeve):  All patients should wear the compression sleeves (SPANDA-) until first follow up visit to surgeon’s office ( about 2-3 weeks) and then check with surgeon if need to continue use.  Take off to shower. Best to keep on as much as possible, even at night.  Wash with mild soap and water; DRIP dry overnight.    Directions to put on and off:  IT TAKES 2 PIECES OF SPANDA- (compression sleeves), (USUALLY DIFFERENT SIZES because a calf is usually smaller than a knee.)   Use the longer tube (spanda-/compression sleeve) pulled up over the calf.   This 1st piece (spanda-/compression sleeve) should be on the calf part of the leg, from just below the knee to the ball of the foot to expose the toes.   The 2nd piece (shorter compression sleeve) is pulled over and past the first sleeve onto the knee. The lower edge of the knee portion should overlap the top of the calf spanda- by a few inches. This is the only place where the 2 different pieces overlap.  The top edge of the second spanda- should be about a few inches above the knee but it should NOT be rolling down. The spanda- should be flat so that it does not roll and become too tight.  Makes sure it is NOT bunched up anywhere.   IF the spanda- feels TOO tight, then REMOVE it and call your surgeon to let them know.      Spanda- knee replacement (single  layer compression sleeve):  All patients should wear the compression sleeves (SPANDA-) until first follow up visit to surgeon’s office ( about 2-3 weeks) and then check with surgeon if need to continue use.  Take off to shower. Best to keep on as much as possible, even at night.  Wash with mild soap and water; DRIP dry overnight.    Directions to put on and off:  IT TAKES 2 PIECES OF SPANDA- (compression sleeves), (USUALLY DIFFERENT SIZES because a calf is usually smaller than a knee.)   Use the longer tube (spanda-/compression sleeve) pulled up over the calf.   This 1st piece (spanda-/compression sleeve) should be on the calf part of the leg, from just below the knee to the ball of the foot to expose the toes.   The 2nd piece (shorter compression sleeve) is pulled over and past the first sleeve onto the knee. The lower edge of the knee portion should overlap the top of the calf spanda- by a few inches. This is the only place where the 2 different pieces overlap.  The top edge of the second spanda- should be about a few inches above the knee but it should NOT be rolling down. The spanda- should be flat so that it does not roll and become too tight.  Makes sure it is NOT bunched up anywhere.   IF the spanda- feels TOO tight, then REMOVE it and call your surgeon to let them know.

## 2025-01-06 ENCOUNTER — HOSPITAL ENCOUNTER (OUTPATIENT)
Facility: HOSPITAL | Age: 65
Discharge: HOME | End: 2025-01-07
Attending: ORTHOPAEDIC SURGERY | Admitting: ORTHOPAEDIC SURGERY
Payer: MEDICARE

## 2025-01-06 ENCOUNTER — APPOINTMENT (OUTPATIENT)
Dept: GENERAL RADIOLOGY | Facility: HOSPITAL | Age: 65
End: 2025-01-06
Attending: PHYSICIAN ASSISTANT
Payer: MEDICARE

## 2025-01-06 ENCOUNTER — ANESTHESIA (OUTPATIENT)
Dept: SURGERY | Facility: HOSPITAL | Age: 65
End: 2025-01-06
Payer: MEDICARE

## 2025-01-06 ENCOUNTER — ANESTHESIA EVENT (OUTPATIENT)
Dept: SURGERY | Facility: HOSPITAL | Age: 65
End: 2025-01-06
Payer: MEDICARE

## 2025-01-06 DIAGNOSIS — Z01.818 PRE-OP TESTING: ICD-10-CM

## 2025-01-06 DIAGNOSIS — M17.11 OSTEOARTHRITIS OF RIGHT KNEE: Primary | ICD-10-CM

## 2025-01-06 LAB
GLUCOSE BLD-MCNC: 149 MG/DL (ref 70–99)
GLUCOSE BLD-MCNC: 211 MG/DL (ref 70–99)
GLUCOSE BLD-MCNC: 221 MG/DL (ref 70–99)

## 2025-01-06 PROCEDURE — 99215 OFFICE O/P EST HI 40 MIN: CPT | Performed by: INTERNAL MEDICINE

## 2025-01-06 PROCEDURE — 73560 X-RAY EXAM OF KNEE 1 OR 2: CPT | Performed by: PHYSICIAN ASSISTANT

## 2025-01-06 PROCEDURE — 76942 ECHO GUIDE FOR BIOPSY: CPT | Performed by: ANESTHESIOLOGY

## 2025-01-06 PROCEDURE — 0SRC0J9 REPLACEMENT OF RIGHT KNEE JOINT WITH SYNTHETIC SUBSTITUTE, CEMENTED, OPEN APPROACH: ICD-10-PCS | Performed by: ORTHOPAEDIC SURGERY

## 2025-01-06 DEVICE — ATTUNE KNEE SYSTEM TIBIAL BASE FIXED BEARING SIZE 4 CEMENTED
Type: IMPLANTABLE DEVICE | Site: KNEE | Status: FUNCTIONAL
Brand: ATTUNE

## 2025-01-06 DEVICE — SMARTSET GMV HIGH PERFORMANCE GENTAMICIN MEDIUM VISCOSITY BONE CEMENT 40G
Type: IMPLANTABLE DEVICE | Site: KNEE | Status: FUNCTIONAL
Brand: SMARTSET

## 2025-01-06 DEVICE — ATTUNE KNEE SYSTEM FEMORAL CRUCIATE RETAINING NARROW SIZE 5N RIGHT CEMENTED
Type: IMPLANTABLE DEVICE | Site: KNEE | Status: FUNCTIONAL
Brand: ATTUNE

## 2025-01-06 DEVICE — ATTUNE PATELLA MEDIALIZED DOME 35MM CEMENTED AOX
Type: IMPLANTABLE DEVICE | Site: KNEE | Status: FUNCTIONAL
Brand: ATTUNE

## 2025-01-06 DEVICE — ATTUNE KNEE SYSTEM TIBIAL INSERT FIXED BEARING MEDIAL STABILIZED RIGHT AOX 5, 5MM
Type: IMPLANTABLE DEVICE | Site: KNEE | Status: FUNCTIONAL
Brand: ATTUNE

## 2025-01-06 RX ORDER — NICOTINE POLACRILEX 4 MG
15 LOZENGE BUCCAL
Status: DISCONTINUED | OUTPATIENT
Start: 2025-01-06 | End: 2025-01-07

## 2025-01-06 RX ORDER — SENNOSIDES 8.6 MG
17.2 TABLET ORAL NIGHTLY
Status: DISCONTINUED | OUTPATIENT
Start: 2025-01-06 | End: 2025-01-07

## 2025-01-06 RX ORDER — ONDANSETRON 2 MG/ML
4 INJECTION INTRAMUSCULAR; INTRAVENOUS EVERY 6 HOURS PRN
Status: DISCONTINUED | OUTPATIENT
Start: 2025-01-06 | End: 2025-01-06 | Stop reason: HOSPADM

## 2025-01-06 RX ORDER — TRAMADOL HYDROCHLORIDE 50 MG/1
50 TABLET ORAL EVERY 6 HOURS SCHEDULED
Status: DISCONTINUED | OUTPATIENT
Start: 2025-01-06 | End: 2025-01-07

## 2025-01-06 RX ORDER — METOCLOPRAMIDE HYDROCHLORIDE 5 MG/ML
10 INJECTION INTRAMUSCULAR; INTRAVENOUS EVERY 8 HOURS PRN
Status: DISCONTINUED | OUTPATIENT
Start: 2025-01-06 | End: 2025-01-06

## 2025-01-06 RX ORDER — ACETAMINOPHEN 500 MG
1000 TABLET ORAL ONCE AS NEEDED
Status: DISCONTINUED | OUTPATIENT
Start: 2025-01-06 | End: 2025-01-06 | Stop reason: HOSPADM

## 2025-01-06 RX ORDER — SULFAMETHOXAZOLE AND TRIMETHOPRIM 800; 160 MG/1; MG/1
1 TABLET ORAL 2 TIMES DAILY
COMMUNITY
Start: 2025-01-07 | End: 2025-01-11

## 2025-01-06 RX ORDER — METOCLOPRAMIDE HYDROCHLORIDE 5 MG/ML
10 INJECTION INTRAMUSCULAR; INTRAVENOUS EVERY 8 HOURS PRN
Status: DISCONTINUED | OUTPATIENT
Start: 2025-01-06 | End: 2025-01-06 | Stop reason: HOSPADM

## 2025-01-06 RX ORDER — MIDAZOLAM HYDROCHLORIDE 1 MG/ML
INJECTION INTRAMUSCULAR; INTRAVENOUS AS NEEDED
Status: DISCONTINUED | OUTPATIENT
Start: 2025-01-06 | End: 2025-01-06 | Stop reason: SURG

## 2025-01-06 RX ORDER — HYDROCODONE BITARTRATE AND ACETAMINOPHEN 5; 325 MG/1; MG/1
1 TABLET ORAL ONCE AS NEEDED
Status: DISCONTINUED | OUTPATIENT
Start: 2025-01-06 | End: 2025-01-06 | Stop reason: HOSPADM

## 2025-01-06 RX ORDER — SODIUM CHLORIDE, SODIUM LACTATE, POTASSIUM CHLORIDE, CALCIUM CHLORIDE 600; 310; 30; 20 MG/100ML; MG/100ML; MG/100ML; MG/100ML
INJECTION, SOLUTION INTRAVENOUS CONTINUOUS
Status: DISCONTINUED | OUTPATIENT
Start: 2025-01-06 | End: 2025-01-07

## 2025-01-06 RX ORDER — NICOTINE POLACRILEX 4 MG
15 LOZENGE BUCCAL
Status: DISCONTINUED | OUTPATIENT
Start: 2025-01-06 | End: 2025-01-06 | Stop reason: HOSPADM

## 2025-01-06 RX ORDER — DEXAMETHASONE SODIUM PHOSPHATE 4 MG/ML
VIAL (ML) INJECTION AS NEEDED
Status: DISCONTINUED | OUTPATIENT
Start: 2025-01-06 | End: 2025-01-06 | Stop reason: SURG

## 2025-01-06 RX ORDER — HYDROMORPHONE HYDROCHLORIDE 1 MG/ML
0.6 INJECTION, SOLUTION INTRAMUSCULAR; INTRAVENOUS; SUBCUTANEOUS EVERY 5 MIN PRN
Status: DISCONTINUED | OUTPATIENT
Start: 2025-01-06 | End: 2025-01-06 | Stop reason: HOSPADM

## 2025-01-06 RX ORDER — INSULIN ASPART 100 [IU]/ML
INJECTION, SOLUTION INTRAVENOUS; SUBCUTANEOUS
Status: COMPLETED
Start: 2025-01-06 | End: 2025-01-06

## 2025-01-06 RX ORDER — HYDROCODONE BITARTRATE AND ACETAMINOPHEN 5; 325 MG/1; MG/1
2 TABLET ORAL ONCE AS NEEDED
Status: DISCONTINUED | OUTPATIENT
Start: 2025-01-06 | End: 2025-01-06 | Stop reason: HOSPADM

## 2025-01-06 RX ORDER — METOCLOPRAMIDE HYDROCHLORIDE 5 MG/ML
INJECTION INTRAMUSCULAR; INTRAVENOUS AS NEEDED
Status: DISCONTINUED | OUTPATIENT
Start: 2025-01-06 | End: 2025-01-06 | Stop reason: SURG

## 2025-01-06 RX ORDER — ASPIRIN 81 MG/1
81 TABLET ORAL 2 TIMES DAILY
Status: DISCONTINUED | OUTPATIENT
Start: 2025-01-07 | End: 2025-01-07

## 2025-01-06 RX ORDER — TRAMADOL HYDROCHLORIDE 50 MG/1
50 TABLET ORAL EVERY 6 HOURS PRN
COMMUNITY

## 2025-01-06 RX ORDER — ASPIRIN 81 MG/1
81 TABLET ORAL 2 TIMES DAILY
COMMUNITY

## 2025-01-06 RX ORDER — LOSARTAN POTASSIUM 50 MG/1
50 TABLET ORAL DAILY
Status: DISCONTINUED | OUTPATIENT
Start: 2025-01-07 | End: 2025-01-07

## 2025-01-06 RX ORDER — SODIUM PHOSPHATE, DIBASIC AND SODIUM PHOSPHATE, MONOBASIC 7; 19 G/230ML; G/230ML
1 ENEMA RECTAL ONCE AS NEEDED
Status: DISCONTINUED | OUTPATIENT
Start: 2025-01-06 | End: 2025-01-07

## 2025-01-06 RX ORDER — DOCUSATE SODIUM 100 MG/1
100 CAPSULE, LIQUID FILLED ORAL 2 TIMES DAILY
COMMUNITY

## 2025-01-06 RX ORDER — DOCUSATE SODIUM 100 MG/1
100 CAPSULE, LIQUID FILLED ORAL 2 TIMES DAILY
Status: DISCONTINUED | OUTPATIENT
Start: 2025-01-06 | End: 2025-01-07

## 2025-01-06 RX ORDER — DIPHENHYDRAMINE HYDROCHLORIDE 50 MG/ML
12.5 INJECTION INTRAMUSCULAR; INTRAVENOUS AS NEEDED
Status: DISCONTINUED | OUTPATIENT
Start: 2025-01-06 | End: 2025-01-06 | Stop reason: HOSPADM

## 2025-01-06 RX ORDER — MYCOPHENOLATE MOFETIL 250 MG/1
1000 CAPSULE ORAL 2 TIMES DAILY
Status: DISCONTINUED | OUTPATIENT
Start: 2025-01-06 | End: 2025-01-07

## 2025-01-06 RX ORDER — DIPHENHYDRAMINE HCL 25 MG
25 CAPSULE ORAL EVERY 4 HOURS PRN
Status: DISCONTINUED | OUTPATIENT
Start: 2025-01-06 | End: 2025-01-07

## 2025-01-06 RX ORDER — DIPHENHYDRAMINE HYDROCHLORIDE 50 MG/ML
25 INJECTION INTRAMUSCULAR; INTRAVENOUS ONCE AS NEEDED
Status: ACTIVE | OUTPATIENT
Start: 2025-01-06 | End: 2025-01-06

## 2025-01-06 RX ORDER — DEXAMETHASONE SODIUM PHOSPHATE 10 MG/ML
INJECTION, SOLUTION INTRAMUSCULAR; INTRAVENOUS AS NEEDED
Status: DISCONTINUED | OUTPATIENT
Start: 2025-01-06 | End: 2025-01-06 | Stop reason: SURG

## 2025-01-06 RX ORDER — MIDAZOLAM HYDROCHLORIDE 1 MG/ML
1 INJECTION INTRAMUSCULAR; INTRAVENOUS EVERY 5 MIN PRN
Status: DISCONTINUED | OUTPATIENT
Start: 2025-01-06 | End: 2025-01-06 | Stop reason: HOSPADM

## 2025-01-06 RX ORDER — ACETAMINOPHEN 500 MG
1000 TABLET ORAL ONCE
Status: DISCONTINUED | OUTPATIENT
Start: 2025-01-06 | End: 2025-01-06 | Stop reason: HOSPADM

## 2025-01-06 RX ORDER — TRANEXAMIC ACID 10 MG/ML
1000 INJECTION, SOLUTION INTRAVENOUS ONCE
Status: COMPLETED | OUTPATIENT
Start: 2025-01-06 | End: 2025-01-06

## 2025-01-06 RX ORDER — FAMOTIDINE 20 MG/1
20 TABLET, FILM COATED ORAL 2 TIMES DAILY
Status: DISCONTINUED | OUTPATIENT
Start: 2025-01-06 | End: 2025-01-07

## 2025-01-06 RX ORDER — KETAMINE HYDROCHLORIDE 50 MG/ML
INJECTION, SOLUTION INTRAMUSCULAR; INTRAVENOUS AS NEEDED
Status: DISCONTINUED | OUTPATIENT
Start: 2025-01-06 | End: 2025-01-06 | Stop reason: SURG

## 2025-01-06 RX ORDER — INSULIN ASPART 100 [IU]/ML
INJECTION, SOLUTION INTRAVENOUS; SUBCUTANEOUS ONCE
Status: COMPLETED | OUTPATIENT
Start: 2025-01-06 | End: 2025-01-06

## 2025-01-06 RX ORDER — CYCLOSPORINE 25 MG/1
75 CAPSULE, GELATIN COATED ORAL EVERY MORNING
Status: DISCONTINUED | OUTPATIENT
Start: 2025-01-07 | End: 2025-01-07

## 2025-01-06 RX ORDER — ACETAMINOPHEN 325 MG/1
TABLET ORAL
Status: COMPLETED
Start: 2025-01-06 | End: 2025-01-06

## 2025-01-06 RX ORDER — MEPERIDINE HYDROCHLORIDE 25 MG/ML
12.5 INJECTION INTRAMUSCULAR; INTRAVENOUS; SUBCUTANEOUS AS NEEDED
Status: DISCONTINUED | OUTPATIENT
Start: 2025-01-06 | End: 2025-01-06 | Stop reason: HOSPADM

## 2025-01-06 RX ORDER — OXYCODONE HYDROCHLORIDE 5 MG/1
5 TABLET ORAL EVERY 4 HOURS PRN
Status: DISCONTINUED | OUTPATIENT
Start: 2025-01-06 | End: 2025-01-07

## 2025-01-06 RX ORDER — PHENYLEPHRINE HCL 10 MG/ML
VIAL (ML) INJECTION AS NEEDED
Status: DISCONTINUED | OUTPATIENT
Start: 2025-01-06 | End: 2025-01-06 | Stop reason: SURG

## 2025-01-06 RX ORDER — DEXTROSE MONOHYDRATE 25 G/50ML
50 INJECTION, SOLUTION INTRAVENOUS
Status: DISCONTINUED | OUTPATIENT
Start: 2025-01-06 | End: 2025-01-07

## 2025-01-06 RX ORDER — HYDROMORPHONE HYDROCHLORIDE 1 MG/ML
0.2 INJECTION, SOLUTION INTRAMUSCULAR; INTRAVENOUS; SUBCUTANEOUS EVERY 2 HOUR PRN
Status: DISCONTINUED | OUTPATIENT
Start: 2025-01-06 | End: 2025-01-07

## 2025-01-06 RX ORDER — ONDANSETRON 2 MG/ML
4 INJECTION INTRAMUSCULAR; INTRAVENOUS EVERY 6 HOURS PRN
Status: DISCONTINUED | OUTPATIENT
Start: 2025-01-06 | End: 2025-01-07

## 2025-01-06 RX ORDER — ACETAMINOPHEN 500 MG
1000 TABLET ORAL 4 TIMES DAILY
COMMUNITY

## 2025-01-06 RX ORDER — BISACODYL 10 MG
10 SUPPOSITORY, RECTAL RECTAL
Status: DISCONTINUED | OUTPATIENT
Start: 2025-01-06 | End: 2025-01-07

## 2025-01-06 RX ORDER — HYDROMORPHONE HYDROCHLORIDE 1 MG/ML
0.4 INJECTION, SOLUTION INTRAMUSCULAR; INTRAVENOUS; SUBCUTANEOUS EVERY 5 MIN PRN
Status: DISCONTINUED | OUTPATIENT
Start: 2025-01-06 | End: 2025-01-06 | Stop reason: HOSPADM

## 2025-01-06 RX ORDER — ATORVASTATIN CALCIUM 20 MG/1
20 TABLET, FILM COATED ORAL EVERY EVENING
Status: DISCONTINUED | OUTPATIENT
Start: 2025-01-06 | End: 2025-01-07

## 2025-01-06 RX ORDER — DEXTROSE MONOHYDRATE 25 G/50ML
50 INJECTION, SOLUTION INTRAVENOUS
Status: DISCONTINUED | OUTPATIENT
Start: 2025-01-06 | End: 2025-01-06 | Stop reason: HOSPADM

## 2025-01-06 RX ORDER — NALOXONE HYDROCHLORIDE 0.4 MG/ML
80 INJECTION, SOLUTION INTRAMUSCULAR; INTRAVENOUS; SUBCUTANEOUS AS NEEDED
Status: DISCONTINUED | OUTPATIENT
Start: 2025-01-06 | End: 2025-01-06 | Stop reason: HOSPADM

## 2025-01-06 RX ORDER — DIPHENHYDRAMINE HYDROCHLORIDE 50 MG/ML
12.5 INJECTION INTRAMUSCULAR; INTRAVENOUS EVERY 4 HOURS PRN
Status: DISCONTINUED | OUTPATIENT
Start: 2025-01-06 | End: 2025-01-07

## 2025-01-06 RX ORDER — ONDANSETRON 2 MG/ML
INJECTION INTRAMUSCULAR; INTRAVENOUS AS NEEDED
Status: DISCONTINUED | OUTPATIENT
Start: 2025-01-06 | End: 2025-01-06 | Stop reason: SURG

## 2025-01-06 RX ORDER — POLYETHYLENE GLYCOL 3350 17 G/17G
17 POWDER, FOR SOLUTION ORAL DAILY PRN
Status: DISCONTINUED | OUTPATIENT
Start: 2025-01-06 | End: 2025-01-07

## 2025-01-06 RX ORDER — SULFAMETHOXAZOLE AND TRIMETHOPRIM 800; 160 MG/1; MG/1
1 TABLET ORAL 2 TIMES DAILY
Status: DISCONTINUED | OUTPATIENT
Start: 2025-01-07 | End: 2025-01-07

## 2025-01-06 RX ORDER — NICOTINE POLACRILEX 4 MG
30 LOZENGE BUCCAL
Status: DISCONTINUED | OUTPATIENT
Start: 2025-01-06 | End: 2025-01-06 | Stop reason: HOSPADM

## 2025-01-06 RX ORDER — ACETAMINOPHEN 325 MG/1
650 TABLET ORAL ONCE
Status: COMPLETED | OUTPATIENT
Start: 2025-01-06 | End: 2025-01-06

## 2025-01-06 RX ORDER — METOCLOPRAMIDE HYDROCHLORIDE 5 MG/ML
5 INJECTION INTRAMUSCULAR; INTRAVENOUS EVERY 8 HOURS PRN
Status: DISCONTINUED | OUTPATIENT
Start: 2025-01-06 | End: 2025-01-07

## 2025-01-06 RX ORDER — HYDROMORPHONE HYDROCHLORIDE 1 MG/ML
0.2 INJECTION, SOLUTION INTRAMUSCULAR; INTRAVENOUS; SUBCUTANEOUS EVERY 5 MIN PRN
Status: DISCONTINUED | OUTPATIENT
Start: 2025-01-06 | End: 2025-01-06 | Stop reason: HOSPADM

## 2025-01-06 RX ORDER — OXYCODONE HYDROCHLORIDE 5 MG/1
5 TABLET ORAL EVERY 6 HOURS PRN
COMMUNITY
End: 2025-01-13

## 2025-01-06 RX ORDER — NICOTINE POLACRILEX 4 MG
30 LOZENGE BUCCAL
Status: DISCONTINUED | OUTPATIENT
Start: 2025-01-06 | End: 2025-01-07

## 2025-01-06 RX ORDER — PREDNISONE 5 MG/1
5 TABLET ORAL DAILY
Status: DISCONTINUED | OUTPATIENT
Start: 2025-01-07 | End: 2025-01-07

## 2025-01-06 RX ORDER — CYCLOSPORINE 25 MG/1
50 CAPSULE, GELATIN COATED ORAL EVERY EVENING
Status: DISCONTINUED | OUTPATIENT
Start: 2025-01-06 | End: 2025-01-07

## 2025-01-06 RX ORDER — FAMOTIDINE 10 MG/ML
20 INJECTION, SOLUTION INTRAVENOUS 2 TIMES DAILY
Status: DISCONTINUED | OUTPATIENT
Start: 2025-01-06 | End: 2025-01-07

## 2025-01-06 RX ORDER — DEXAMETHASONE SODIUM PHOSPHATE 10 MG/ML
8 INJECTION, SOLUTION INTRAMUSCULAR; INTRAVENOUS ONCE
Status: COMPLETED | OUTPATIENT
Start: 2025-01-07 | End: 2025-01-07

## 2025-01-06 RX ORDER — HYDROMORPHONE HYDROCHLORIDE 1 MG/ML
0.4 INJECTION, SOLUTION INTRAMUSCULAR; INTRAVENOUS; SUBCUTANEOUS EVERY 2 HOUR PRN
Status: DISCONTINUED | OUTPATIENT
Start: 2025-01-06 | End: 2025-01-07

## 2025-01-06 RX ORDER — SODIUM CHLORIDE, SODIUM LACTATE, POTASSIUM CHLORIDE, CALCIUM CHLORIDE 600; 310; 30; 20 MG/100ML; MG/100ML; MG/100ML; MG/100ML
INJECTION, SOLUTION INTRAVENOUS CONTINUOUS
Status: DISCONTINUED | OUTPATIENT
Start: 2025-01-06 | End: 2025-01-06 | Stop reason: HOSPADM

## 2025-01-06 RX ADMIN — ONDANSETRON 4 MG: 2 INJECTION INTRAMUSCULAR; INTRAVENOUS at 15:03:00

## 2025-01-06 RX ADMIN — KETAMINE HYDROCHLORIDE 25 MG: 50 INJECTION, SOLUTION INTRAMUSCULAR; INTRAVENOUS at 13:51:00

## 2025-01-06 RX ADMIN — DEXAMETHASONE SODIUM PHOSPHATE 4 MG: 4 MG/ML VIAL (ML) INJECTION at 13:55:00

## 2025-01-06 RX ADMIN — DEXAMETHASONE SODIUM PHOSPHATE 2 MG: 10 INJECTION, SOLUTION INTRAMUSCULAR; INTRAVENOUS at 13:50:00

## 2025-01-06 RX ADMIN — PHENYLEPHRINE HCL 100 MCG: 10 MG/ML VIAL (ML) INJECTION at 14:47:00

## 2025-01-06 RX ADMIN — MIDAZOLAM HYDROCHLORIDE 2 MG: 1 INJECTION INTRAMUSCULAR; INTRAVENOUS at 13:33:00

## 2025-01-06 RX ADMIN — TRANEXAMIC ACID 1000 MG: 10 INJECTION, SOLUTION INTRAVENOUS at 13:57:00

## 2025-01-06 RX ADMIN — METOCLOPRAMIDE HYDROCHLORIDE 10 MG: 5 INJECTION INTRAMUSCULAR; INTRAVENOUS at 13:55:00

## 2025-01-06 RX ADMIN — PHENYLEPHRINE HCL 100 MCG: 10 MG/ML VIAL (ML) INJECTION at 14:12:00

## 2025-01-06 RX ADMIN — SODIUM CHLORIDE, SODIUM LACTATE, POTASSIUM CHLORIDE, CALCIUM CHLORIDE: 600; 310; 30; 20 INJECTION, SOLUTION INTRAVENOUS at 15:16:00

## 2025-01-06 NOTE — ANESTHESIA PROCEDURE NOTES
Spinal Block    Date/Time: 1/6/2025 1:35 PM    Performed by: Justin Mckinley MD  Authorized by: Justin Mckinley MD      General Information and Staff    Start Time:  1/6/2025 1:35 PM  End Time:  1/6/2025 1:46 PM  Anesthesiologist:  Justin Mckinley MD  Performed by:  Anesthesiologist  Site identification: surface landmarks    Preanesthetic Checklist: patient identified, IV checked, risks and benefits discussed, monitors and equipment checked, pre-op evaluation, timeout performed, anesthesia consent and sterile technique used      Procedure Details    Patient Position:  Sitting  Prep: ChloraPrep    Monitoring:  Cardiac monitor, heart rate and continuous pulse ox  Approach:  Midline  Location:  L2-3  Injection Technique:  Single-shot    Needle    Needle Type:  Sprotte  Needle Gauge:  22 G  Needle Length:  3.5 in    Assessment    Sensory Level:   Events: clear CSF, CSF aspirated, well tolerated and blood negative      Additional Comments     Difficult spinal, several attempts at l3-4, successful at l2-3 with 22g

## 2025-01-06 NOTE — OPERATIVE REPORT
ANATOMIC ALIGNMENT TOTAL KNEE OPERATIVE REPORT:  DATE OF SURGERY: 1/6/2025    Brinda Max       [id number]     4/18/1960    PREOP DX: RIGHT  KNEE PRIMARY OSTEOARTHRITIS  POSTOP DX:SAME  PROCEDURE: RIGHT TOTAL KNEE REPLACEMENT (ANATOMIC ALIGNMENT)  SURGEON: ADRIANNA KUMAR MD  FIRST ASSIST: FELICIA العلي PA-C  ANESTHESIA: SPINAL AND ADDUCTOR CANAL BLOCK  EBL: 50 ml  SPECIMEN: DISTAL FEMORAL AND PROXIMAL TIBIA CUT BONE  COMPLICATIONS: NONE  DRAIN: NONE  TT: LESS THAN ONE HOUR   IMPLANT:  DEPUY ATTUNE CR FEMORAL SIZE  5N ,  FB TIBIA SIZE 4, MS POLY SIZE 5,  35 MEDIALIZED DOME PATELLA  PROCEDURE NOTE:  PATIENT WAS CORRECTLY IDENTIFIED AND OPERATIVE SITE WAS VERIFIED IN THE PREOPERATIVE HOLDING AREA.  PATIENT WAS BROUGHT TO THE OR AND WAS PLACED IN SUPINE POSITION.  PREOPERATIVE ANTIBIOTIC AND TXA WERE GIVEN.  NONOPERATIVE LEG HAD SCD APPLIED AND WAS CUSHIONED.   ANESTHESIA WAS ADMINISTERED.  ARMS WERE POSITIONED BY ANESTHESIA.    OPERATIVE LEG WAS PREPPED AND DRAPED IN SURGICALLY STERILE AND STANDARD FASHION.   TIMEOUT WAS DONE.  BLOOD WAS EXSANGUINATED.  TOURNIQUET WAS INFLATED.  ANTERIOR LINEAR INCISION WAS MADE.  MEDIAL ARTHROTOMY WAS PERFORMED.  LIMITED MEDIAL RELEASE WAS DONE.  RETROPATELLAR FAT AND ANTERIOR FEMORAL FAT WERE REMOVED IN LIMITED FASHION.  PATELLA WAS SUBLUXED LATERALLY.  KNEE WAS FLEXED.  OSTEOARTHRITIS WAS IDENTIFIED.  OSTEOPHYTES WERE REMOVED.   ACL/PCL WERE REMOVED.  DISTAL FEMORAL INTRAMEDULLARY DRILL WAS DONE.  9 mm DISTAL FEMORAL CUT WAS MADE BY USING SURFACE MATCHING ANATOMIC TECHNIQUE.    PROXIMAL TIBIA WAS SUBLUXED ANTERIORLY.  MEDIAL AND LATERAL MENISCI WERE REMOVED.  PROXIMAL TIBIA CUT WAS MADE USING EXTRAMEDULLARY GUIDE TO MATCH THE DISTAL FEMORAL CUT IN PARALLEL FASHION.  IT WAS A VARUS CUT.    EXTENSION GAP WAS CHECKED USING AN EXTENSION SPACER.    ATTENTION WAS TURNED BACK TO DISTAL FEMUR.  KNEE WAS FLEXED.  FEMUR WAS SIZED AT 5.   ANTERIOR-POSTERIOR CUT WAS MADE AT 0 DEG IN  LINE WITH POSTERIOR FEMORAL CONDYLES.  IT WAS A EQUAL CUT FROM BOTH POSTERIOR CONDYLES.   FLEXION AND EXTENSION GAPS WERE CHECKED USING A SPACER BLOCK.  GAPS WERE FOUND TO SATISFACTORY.    FEMUR WAS FINISHED OFF WITH CHAMFER AND NOTCH CUTS IN USUAL FASHION.  POSTERIOR FEMORAL OSTEOPHYTES WERE REMOVED.  POSTERIOR CAPSULAR RELEASE WAS DONE CAREFULLY.    PROXIMAL TIBIA WAS EXPOSED.  TIBIA WAS SIZED at 4 AND ROTATION WAS SET USING MEDIAL 1/3 OF TIBIA TUBERCLE.  TIBIA WAS PREPARED IN STANDARD FASHION.  TRIAL COMPONENTS WERE PLACED.  PATELLA WAS EVERTED AND CUT WAS MADE FREE HANDED FASHION TO APPROPRIATE THICKNESS.   PATELLA WAS FINISHED.   TRIAL COMPONENTS WERE INSERTED.   KNEE WAS RANGED.  GOOD FULL EXTENSION WAS ESTABLISHED AND FLEXION BEYOND BEYOND 120 DEG  KNEE WAS OBTAINED.  KNEE WAS STABLE TO VALGUS AND VARUS STRESS.  PATELLA TRACKED NICELY.  ALL THE TRIAL IMPLANTS WERE REMOVED.  WOUND WAS IRRIGATED.     LOCAL COCKTAIL WAS INFILTRATED CAREFULLY TO POSTERIOR CAPSULE, PERIOSTEUM, BOTH GUTTERS, AND SUPERFICIAL SOFT TISSUE.  CEMENT WAS MIXED.  CEMENT WAS CAREFULLY APPLIED TO BOTH BONE SURFACE AND IMPLANT SURFACE.  FIRST TIBIA COMPONENT, THEN FEMORAL COMPONENT WERE APPLIED.  EXCESS CEMENT WAS REMOVED. REAL POLY WAS INSERTED. KNEE WAS EXTENDED.  PATELLAR COMPONENT WAS APPLIED.  KNEE WAS HELD IN EXTENSION UNTIL CEMENT WAS SET.  TOURNIQUET WAS DEFLATED.  HEMOSTASIS WAS OBTAINED.  IRRIGATION WAS DONE.    KNEE WAS RANGED AGAIN WITH GOOD STABILITY AND PATELLAR TRACKING.   ANY LOOSE OR EXCESS CEMENT WAS REMOVED.    ARTHROTOMY WAS CLOSED. SUBCUTANEOUS LAYER WAS CLOSED IN MULTIPLE LAYERS.  SKIN WAS CLOSED.  STERILE DRESSING WAS APPLIED.  ALL COUNTS WERE CORRECT.  FIRST ASSISTANT WAS NECESSARY FOR PATIENT POSITIONING, RETRACTION OF SOFT TISSUES, IMPLANT INSERTION, DISLOCATION AND REDUCTION OF THE KNEE JOINT, STABILITY TESTING, AND WOUND CLOSURE.  Tony Lopez MD  1/6/2025

## 2025-01-06 NOTE — CONSULTS
[facility]  CONSULT     Brinda Max Patient Status:  Outpatient in a Bed    1960 MRN [id number]   Location [facility] 3SW-A Attending Tony Lopez MD   Hosp Day # 0 PCP Curtis Huber DO     Reason for consult: Medical management    Requested by: Dr. Lopez    Subjective:   History of Present Illness:     Brinda Max is a 64 year old female with PMH of kidney transplant, HTN    Consult for post op medical management after TKA    Patient seen and examined postoperatively, no somatic complaints at present, normal state of health prior to surgery, unremarkable perioperative course.       History/Other:    Past Medical History:  Past Medical History:    Arthritis    Knees bone on bone    Back problem    Cancer (HCC)    SKIN CANCER    Easy bruising    ASA 81 mg daily    Glomerulonephritis    Hemorrhoids    Pregnancy    High blood pressure    History of COVID-19    fatigue. not hospitalized. no continued symptoms    Kidney failure    Transplant 02    Osteoarthritis    Other and unspecified hyperlipidemia    Pain in joints    Renal disorder    kidney transplant    Type II or unspecified type diabetes mellitus without mention of complication, not stated as uncontrolled    Unspecified essential hypertension    Visual impairment    glasses / contacts    Wears glasses     Past Surgical History:   Past Surgical History:   Procedure Laterality Date    Colonoscopy  , 2017    Organ transplant  2002    Kidney    Other surgical history  age 42    Renal transplant    Tonsillectomy  1964    Transplantation of kidney      02 at [place]      Family History:   Family History   Problem Relation Age of Onset    Other (Other) Mother         alzheimers    Diabetes Mother         Type 1    Other Mother         Parkinsons    Other (Other) Self 54        basal skin cancer     Social History:    reports that she has never smoked. She has never used smokeless tobacco. She reports current alcohol use.  She reports that she does not use drugs.     Allergies: Allergies[1]    Medications:  Medications Ordered Prior to Encounter[2]    Review of Systems:   A comprehensive review of systems was completed.    Pertinent positives and negatives noted in the HPI.    Objective:   Physical Exam:    /61 (BP Location: Right arm)   Pulse 69   Temp 97.5 °F (36.4 °C) (Oral)   Resp 17   Ht 5' 1\" (1.549 m)   Wt 179 lb (81.2 kg)   SpO2 100%   BMI 33.82 kg/m²   General: No acute distress, Alert  Respiratory: No rhonchi, no wheezes  Cardiovascular: S1, S2. Regular rate and rhythm  Abdomen: Soft, NT/ND, +BS  Neuro: No new focal deficits  Extremities: No edema      Results:    Labs:      Labs Last 24 Hours:  No results for input(s): \"WBC\", \"HGB\", \"MCV\", \"PLT\", \"BAND\", \"INR\" in the last 168 hours.    Invalid input(s): \"LYM#\", \"MONO#\", \"BASOS#\", \"EOSIN#\"    No results for input(s): \"GLU\", \"BUN\", \"CREATSERUM\", \"GFRAA\", \"GFRNAA\", \"CA\", \"ALB\", \"NA\", \"K\", \"CL\", \"CO2\", \"ALKPHO\", \"AST\", \"ALT\", \"BILT\", \"TP\" in the last 168 hours.    No results for input(s): \"PTP\", \"INR\" in the last 168 hours.    No results for input(s): \"TROP\", \"CK\" in the last 168 hours.      Imaging: Imaging data reviewed in Epic.    Assessment & Plan:      #Right Knee OA s/p TKA  -date of surgery 1/6  -pain management per ortho  -PT/OT     #RPGN s/p renal transplant  #CKD III  -cont home cyclosporine, mycophenolate,   -Decadron today and then continue usual prednisone      #HLD  -cont home statin     #HTN  -cont home losartan    #Type II DM  -ISS        Plan of care discussed with pt    Savana Maldonado DO  1/6/2025    The 21st Century Cures Act makes medical notes like these available to patients in the interest of transparency. Please be advised this is a medical document. Medical documents are intended to carry relevant information, facts as evident, and the clinical opinion of the practitioner. The medical note is intended as peer to peer communication and may  appear blunt or direct. It is written in medical language and may contain abbreviations or verbiage that are unfamiliar.            [1]   Allergies  Allergen Reactions    Adhesive Tape OTHER (SEE COMMENTS)     Caused redness after being on for a length of time    Ace Inhibitors Coughing   [2]   No current facility-administered medications on file prior to encounter.     Current Outpatient Medications on File Prior to Encounter   Medication Sig Dispense Refill    oxyCODONE 5 MG Oral Tab Take 1 tablet (5 mg total) by mouth every 6 (six) hours as needed for Pain. Post op      predniSONE 5 MG Oral Tab Take 1 tablet (5 mg total) by mouth daily. 90 tablet 1    Mycophenolate Mofetil 500 MG Oral Tab Take 2 tablets (1,000 mg total) by mouth 2 (two) times daily. 360 tablet 0    ASPIRIN LOW DOSE 81 MG Oral Tab EC Take 1 tablet (81 mg total) by mouth daily.      cycloSPORINE non-modified 25 MG Oral Cap Take 3 capsules (75 mg total) by mouth every morning.      cycloSPORINE non-modified 25 MG Oral Cap Take 2 capsules (50 mg total) by mouth every evening.      ATORVASTATIN 20 MG Oral Tab TAKE 1 TABLET BY MOUTH DAILY 100 tablet 2    magnesium oxide 400 MG Oral Tab Take 1 tablet (400 mg total) by mouth 2 (two) times daily.      multivitamin Oral Tab Take 1 tablet by mouth daily.      Cinnamon 500 MG Oral Cap Take 1,000 mg by mouth 2 (two) times daily.      Omega 3 1000 MG Oral Cap Take 2,000 mg by mouth 2 (two) times daily.      Cholecalciferol (VITAMIN D) 1000 UNITS Oral Cap Take 1 capsule by mouth 2 (two) times daily.      [START ON 1/7/2025] sulfamethoxazole-trimethoprim -160 MG Oral Tab per tablet Take 1 tablet by mouth 2 (two) times daily. Post op  Take 1 tablet by mouth 2 (two) times daily for 5 days. Beginning 1/7/25      acetaminophen 500 MG Oral Tab Take 2 tablets (1,000 mg total) by mouth 4 (four) times daily. Post op      aspirin 81 MG Oral Tab EC Take 1 tablet (81 mg total) by mouth in the morning and 1 tablet (81  mg total) before bedtime. Post op.      docusate sodium 100 MG Oral Cap Take 1 capsule (100 mg total) by mouth 2 (two) times daily. Post op      traMADol 50 MG Oral Tab Take 1 tablet (50 mg total) by mouth every 6 (six) hours as needed for Pain. Post op.  Take 1 tablet (50 mg total) by mouth every 6 (six) hours as needed for Pain (moderate pain).      Glucose Blood In Vitro Strip 1 each by Other route daily. 90 each 3

## 2025-01-06 NOTE — CM/SW NOTE
01/06/25 1100   CM/SW Referral Data   Referral Source Physician   Reason for Referral Discharge planning   Informant EMR;Clinical Staff Member   Discharge Needs   Anticipated D/C needs Home health care   Choice of Post-Acute Provider   Patient/family choice [facility]     Pt is a 64 year old female admitted for R TKA.  She was set up with [facility] pre-operatively.  Referral packet sent to PC via [employer]    Addendum 1647  Met w/pt at the bedside with HH list/quality data and she reports she is starting OP PT on Thursday and does not need HH.  Sent message to [facility] to notify them    / to remain available for support and/or discharge planning.     Teresa Brantley MBA MSN, RN CTL/  [phone]

## 2025-01-06 NOTE — ANESTHESIA PREPROCEDURE EVALUATION
PRE-OP EVALUATION    Patient Name: Brinda Max    Admit Diagnosis: RIGHT KNEE OSTEOARTHRITIS    Pre-op Diagnosis: RIGHT KNEE OSTEOARTHRITIS    RIGHT TOTAL KNEE REPLACEMENT    Anesthesia Procedure: RIGHT TOTAL KNEE REPLACEMENT (Right: Knee)    Surgeons and Role:     * Tony Lopez MD - Primary    Pre-op vitals reviewed.        Body mass index is 32.88 kg/m².    Current medications reviewed.  Hospital Medications:   acetaminophen (Tylenol Extra Strength) tab 1,000 mg  1,000 mg Oral Once    lactated ringers infusion   Intravenous Continuous    tranexamic acid in sodium chloride 0.7% (Cyklokapron) 1000 mg/100mL infusion premix 1,000 mg  1,000 mg Intravenous Once    ceFAZolin (Ancef) 2g in 10mL IV syringe premix  2 g Intravenous Once    clonidine/epinephrine/ropivacaine/ketorolac in 0.9% NaCl 60 mL pain cocktail syringe for knee arthroplasty   Infiltration Once (Intra-Op)       Outpatient Medications:   Prescriptions Prior to Admission[1]    Allergies: Adhesive tape and Ace inhibitors      Anesthesia Evaluation    Patient summary reviewed.    Anesthetic Complications  (-) history of anesthetic complications         GI/Hepatic/Renal  Comment: Sp kidney tx           (+) chronic renal disease and CRI                   Cardiovascular                  (+) hypertension   (+) hyperlipidemia                                  Endo/Other      (+) diabetes                     (+) arthritis       Pulmonary                           Neuro/Psych                                      Past Surgical History:   Procedure Laterality Date    Colonoscopy  2013, 2017    Organ transplant  2002    Kidney    Other surgical history  age 42    Renal transplant    Tonsillectomy  1964    Transplantation of kidney      4/25/02 at [place]     Social History     Socioeconomic History    Marital status:    Tobacco Use    Smoking status: Never    Smokeless tobacco: Never   Vaping Use    Vaping status: Never Used   Substance and Sexual  Activity    Alcohol use: Yes     Comment: occasional    Drug use: No   Other Topics Concern    Caffeine Concern Yes    Exercise No     History   Drug Use No     Available pre-op labs reviewed.  Lab Results   Component Value Date    WBC 6.9 12/16/2024    RBC 3.74 (L) 12/16/2024    HGB 11.1 (L) 12/16/2024    HCT 35.5 12/16/2024    MCV 94.9 12/16/2024    MCH 29.7 12/16/2024    MCHC 31.3 12/16/2024    RDW 13.0 12/16/2024    .0 12/16/2024     Lab Results   Component Value Date     12/16/2024    K 4.3 12/16/2024     12/16/2024    CO2 22.0 12/16/2024    BUN 32 (H) 12/16/2024    CREATSERUM 1.57 (H) 12/16/2024     (H) 12/16/2024    CA 10.0 12/16/2024            Airway      Mallampati: II  Mouth opening: 3 FB  TM distance: 4 - 6 cm  Neck ROM: full Cardiovascular      Rhythm: regular  Rate: normal     Dental  Comment: No loose teeth reported by patient           Pulmonary      Breath sounds clear to auscultation bilaterally.               Other findings              ASA: 3   Plan: regional and spinal  NPO status verified and patient meets guidelines.    Post-procedure pain management plan discussed with surgeon and patient.  Surgeon requests: regional block  Comment: The risks and benefits of neuraxial anesthesia have been explained to the patient as outlined in the anesthesia consent.  Risks include but are not limited to: bleeding, infection, nerve damage, paresthesias, headaches, and incomplete block.  The consent was signed without further questions.     The risks and benefits of regional anesthesia including, but not limited to bleeding, infection, paresthesias, nerve damage, and incomplete block have been explained to the patient as indicated on the anesthesia consent form, which has been signed.      Plan/risks discussed with: patient                Present on Admission:  **None**             [1]   Medications Prior to Admission   Medication Sig Dispense Refill Last Dose/Taking    oxyCODONE 5  MG Oral Tab Take 1 tablet (5 mg total) by mouth every 6 (six) hours as needed for Pain. Post op   Taking As Needed    LOSARTAN 50 MG Oral Tab TAKE 1 TABLET BY MOUTH DAILY 100 tablet 2 1/5/2025 at  8:00 AM    predniSONE 5 MG Oral Tab Take 1 tablet (5 mg total) by mouth daily. 90 tablet 1 1/6/2025 at  9:00 AM    Mycophenolate Mofetil 500 MG Oral Tab Take 2 tablets (1,000 mg total) by mouth 2 (two) times daily. 360 tablet 0 1/6/2025 at  9:00 AM    ASPIRIN LOW DOSE 81 MG Oral Tab EC Take 1 tablet (81 mg total) by mouth daily.   12/30/2024    cycloSPORINE non-modified 25 MG Oral Cap Take 3 capsules (75 mg total) by mouth every morning.   1/6/2025 at  9:00 AM    cycloSPORINE non-modified 25 MG Oral Cap Take 2 capsules (50 mg total) by mouth every evening.   1/5/2025 at  7:00 PM    ATORVASTATIN 20 MG Oral Tab TAKE 1 TABLET BY MOUTH DAILY 100 tablet 2 1/5/2025 at  7:00 PM    magnesium oxide 400 MG Oral Tab Take 1 tablet (400 mg total) by mouth 2 (two) times daily.   1/6/2025 at  8:00 AM    multivitamin Oral Tab Take 1 tablet by mouth daily.   1/6/2025 at  9:00 AM    Cinnamon 500 MG Oral Cap Take 1,000 mg by mouth 2 (two) times daily.   1/5/2025 at  7:00 PM    Omega 3 1000 MG Oral Cap Take 2,000 mg by mouth 2 (two) times daily.   12/23/2024    Cholecalciferol (VITAMIN D) 1000 UNITS Oral Cap Take 1 capsule by mouth 2 (two) times daily.   1/5/2025 at  7:00 PM    [START ON 1/7/2025] sulfamethoxazole-trimethoprim -160 MG Oral Tab per tablet Take 1 tablet by mouth 2 (two) times daily. Post op  Take 1 tablet by mouth 2 (two) times daily for 5 days. Beginning 1/7/25       acetaminophen 500 MG Oral Tab Take 2 tablets (1,000 mg total) by mouth 4 (four) times daily. Post op       aspirin 81 MG Oral Tab EC Take 1 tablet (81 mg total) by mouth in the morning and 1 tablet (81 mg total) before bedtime. Post op.       docusate sodium 100 MG Oral Cap Take 1 capsule (100 mg total) by mouth 2 (two) times daily. Post op       traMADol 50  MG Oral Tab Take 1 tablet (50 mg total) by mouth every 6 (six) hours as needed for Pain. Post op.  Take 1 tablet (50 mg total) by mouth every 6 (six) hours as needed for Pain (moderate pain).       Glucose Blood In Vitro Strip 1 each by Other route daily. 90 each 3

## 2025-01-06 NOTE — INTERVAL H&P NOTE
Pre-op Diagnosis: RIGHT KNEE OSTEOARTHRITIS    The above referenced consult was reviewed by Tony Lopez MD on 1/6/2025, the patient was examined and no significant changes have occurred in the patient's condition since the H&P was performed.  I discussed with the patient and/or legal representative the potential benefits, risks and side effects of this procedure; the likelihood of the patient achieving goals; and potential problems that might occur during recuperation.  I discussed reasonable alternatives to the procedure, including risks, benefits and side effects related to the alternatives and risks related to not receiving this procedure.  We will proceed with procedure as planned.

## 2025-01-06 NOTE — ANESTHESIA PROCEDURE NOTES
Regional Block    Date/Time: 1/6/2025 1:47 PM    Performed by: Justin Mckinley MD  Authorized by: Justin Mckinley MD      General Information and Staff    Start Time:  1/6/2025 1:47 PM  End Time:  1/6/2025 1:50 PM  Anesthesiologist:  Justin Mckinley MD  Performed by:  Anesthesiologist  Patient Location:  OR    Block Placement: Post Induction  Site Identification: real time ultrasound guided and image stored and retrievable    Block site/laterality marked before start: site marked  Reason for Block: at surgeon's request and post-op pain management    Preanesthetic Checklist: 2 patient identifers, IV checked, site marked, risks and benefits discussed, monitors and equipment checked, pre-op evaluation, timeout performed, anesthesia consent, sterile technique used, no prohibitive neurological deficits and no local skin infection at insertion site      Procedure Details    Patient Position:  Supine  Prep: ChloraPrep    Monitoring:  Cardiac monitor, continuous pulse ox, blood pressure cuff and heart rate  Block Type:  Adductor canal  Laterality:  Right  Injection Technique:  Single-shot    Needle    Needle Type:  Short-bevel and echogenic  Needle Gauge:  21 G  Needle Length:  110 mm  Needle Localization:  Ultrasound guidance  Reason for Ultrasound Use: appropriate spread of the medication was noted in real time and no ultrasound evidence of intravascular and/or intraneural injection            Assessment    Injection Assessment:  Good spread noted, negative resistance, negative aspiration for heme, incremental injection and low pressure  Heart Rate Change: No    - Patient tolerated block procedure well without evidence of immediate block related complications.     Medications  1/6/2025 1:47 PM      Additional Comments    Medication:  Ropivacaine 0.375% 20mL + decadron PF 2mg

## 2025-01-06 NOTE — ANESTHESIA POSTPROCEDURE EVALUATION
[facility]    Brinda Max Patient Status:  Outpatient in a Bed   Age/Gender 64 year old female MRN [id number]   Location [facility] SURGERY Attending Tony Lopez MD   Hosp Day # 0 PCP Curtis Huber DO       Anesthesia Post-op Note    RIGHT TOTAL KNEE REPLACEMENT    Procedure Summary       Date: 01/06/25 Room / Location:  MAIN OR 12 / [facility] MAIN OR    Anesthesia Start: 1333 Anesthesia Stop: 1516    Procedure: RIGHT TOTAL KNEE REPLACEMENT (Right: Knee) Diagnosis: (RIGHT KNEE OSTEOARTHRITIS)    Surgeons: Tony Lopez MD Anesthesiologist: Justin Mckinley MD    Anesthesia Type: regional, spinal ASA Status: 3            Anesthesia Type: No value filed.    Vitals Value Taken Time   /48 01/06/25 1516   Temp 98.3 01/06/25 1516   Pulse 93 01/06/25 1516   Resp 16 01/06/25 1516   SpO2 98 01/06/25 1516       Patient Location: PACU    Anesthesia Type: regional and spinal    Airway Patency: patent    Postop Pain Control: adequate    Mental Status: mildly sedated but able to meaningfully participate in the post-anesthesia evaluation    Nausea/Vomiting: none    Cardiopulmonary/Hydration status: stable euvolemic    Complications: no apparent anesthesia related complications    Postop vital signs: stable    Dental Exam: Unchanged from Preop    Patient to be discharged from PACU when criteria met.

## 2025-01-06 NOTE — INTERVAL H&P NOTE
Pre-op Diagnosis: RIGHT KNEE OSTEOARTHRITIS    The above referenced H&P was reviewed by Tony Lopez MD on 1/6/2025, the patient was examined and no significant changes have occurred in the patient's condition since the H&P was performed.  I discussed with the patient and/or legal representative the potential benefits, risks and side effects of this procedure; the likelihood of the patient achieving goals; and potential problems that might occur during recuperation.  I discussed reasonable alternatives to the procedure, including risks, benefits and side effects related to the alternatives and risks related to not receiving this procedure.  We will proceed with procedure as planned.

## 2025-01-06 NOTE — PLAN OF CARE
A&Ox4. VSS. On room air. . IS encouraged. SCDs on BLE. Ankle pumps encouraged. Tolerating regular diet. Last BM 1/6. DTV at 2300. Pain controlled. Dressing to right knee, C/D/I. Plan is to work with PT/OT. Patient/Family updated on plan of care. Safety precautions in place. Call light within reach.

## 2025-01-07 VITALS
OXYGEN SATURATION: 98 % | DIASTOLIC BLOOD PRESSURE: 68 MMHG | RESPIRATION RATE: 18 BRPM | TEMPERATURE: 98 F | HEART RATE: 65 BPM | HEIGHT: 61 IN | SYSTOLIC BLOOD PRESSURE: 114 MMHG | BODY MASS INDEX: 33.79 KG/M2 | WEIGHT: 179 LBS

## 2025-01-07 PROBLEM — E87.5 HYPERKALEMIA: Status: ACTIVE | Noted: 2025-01-07

## 2025-01-07 LAB
ALBUMIN SERPL-MCNC: 3.8 G/DL (ref 3.2–4.8)
ALBUMIN/GLOB SERPL: 1.7 {RATIO} (ref 1–2)
ALP LIVER SERPL-CCNC: 101 U/L
ALT SERPL-CCNC: 7 U/L
ANION GAP SERPL CALC-SCNC: 10 MMOL/L (ref 0–18)
ANION GAP SERPL CALC-SCNC: 7 MMOL/L (ref 0–18)
AST SERPL-CCNC: 16 U/L (ref ?–34)
BILIRUB SERPL-MCNC: 0.5 MG/DL (ref 0.2–1.1)
BUN BLD-MCNC: 34 MG/DL (ref 9–23)
BUN BLD-MCNC: 39 MG/DL (ref 9–23)
CALCIUM BLD-MCNC: 10.1 MG/DL (ref 8.7–10.4)
CALCIUM BLD-MCNC: 9.7 MG/DL (ref 8.7–10.4)
CHLORIDE SERPL-SCNC: 106 MMOL/L (ref 98–112)
CHLORIDE SERPL-SCNC: 108 MMOL/L (ref 98–112)
CO2 SERPL-SCNC: 21 MMOL/L (ref 21–32)
CO2 SERPL-SCNC: 23 MMOL/L (ref 21–32)
CREAT BLD-MCNC: 1.69 MG/DL
CREAT BLD-MCNC: 1.74 MG/DL
EGFRCR SERPLBLD CKD-EPI 2021: 32 ML/MIN/1.73M2 (ref 60–?)
EGFRCR SERPLBLD CKD-EPI 2021: 33 ML/MIN/1.73M2 (ref 60–?)
ERYTHROCYTE [DISTWIDTH] IN BLOOD BY AUTOMATED COUNT: 13 %
GLOBULIN PLAS-MCNC: 2.3 G/DL (ref 2–3.5)
GLUCOSE BLD-MCNC: 159 MG/DL (ref 70–99)
GLUCOSE BLD-MCNC: 174 MG/DL (ref 70–99)
GLUCOSE BLD-MCNC: 176 MG/DL (ref 70–99)
GLUCOSE BLD-MCNC: 179 MG/DL (ref 70–99)
HCT VFR BLD AUTO: 31.3 %
HGB BLD-MCNC: 10.6 G/DL
MCH RBC QN AUTO: 30.5 PG (ref 26–34)
MCHC RBC AUTO-ENTMCNC: 33.9 G/DL (ref 31–37)
MCV RBC AUTO: 89.9 FL
OSMOLALITY SERPL CALC.SUM OF ELEC: 298 MOSM/KG (ref 275–295)
OSMOLALITY SERPL CALC.SUM OF ELEC: 298 MOSM/KG (ref 275–295)
PLATELET # BLD AUTO: 227 10(3)UL (ref 150–450)
POTASSIUM SERPL-SCNC: 4.7 MMOL/L (ref 3.5–5.1)
POTASSIUM SERPL-SCNC: 5.7 MMOL/L (ref 3.5–5.1)
PROT SERPL-MCNC: 6.1 G/DL (ref 5.7–8.2)
RBC # BLD AUTO: 3.48 X10(6)UL
SODIUM SERPL-SCNC: 137 MMOL/L (ref 136–145)
SODIUM SERPL-SCNC: 138 MMOL/L (ref 136–145)
WBC # BLD AUTO: 14.5 X10(3) UL (ref 4–11)

## 2025-01-07 PROCEDURE — 99232 SBSQ HOSP IP/OBS MODERATE 35: CPT | Performed by: HOSPITALIST

## 2025-01-07 RX ORDER — TRAMADOL HYDROCHLORIDE 50 MG/1
50 TABLET ORAL EVERY 12 HOURS SCHEDULED
Status: DISCONTINUED | OUTPATIENT
Start: 2025-01-07 | End: 2025-01-07

## 2025-01-07 RX ORDER — FAMOTIDINE 10 MG/ML
20 INJECTION, SOLUTION INTRAVENOUS DAILY
Status: DISCONTINUED | OUTPATIENT
Start: 2025-01-08 | End: 2025-01-07

## 2025-01-07 RX ORDER — FAMOTIDINE 20 MG/1
20 TABLET, FILM COATED ORAL DAILY
Status: DISCONTINUED | OUTPATIENT
Start: 2025-01-08 | End: 2025-01-07

## 2025-01-07 NOTE — OCCUPATIONAL THERAPY NOTE
OCCUPATIONAL THERAPY EVALUATION - INPATIENT    Room Number: 378/378-A  Evaluation Date: 1/7/2025     Type of Evaluation: Initial  Presenting Problem: R TKA    Physician Order: IP Consult to Occupational Therapy  Reason for Therapy:  ADL/IADL Dysfunction and Discharge Planning    Admitted for scheduled TKA on 1/6/24    OCCUPATIONAL THERAPY ASSESSMENT   Patient is a 64 year old female admitted on 1/6/2025 with Presenting Problem: R TKA. Co-Morbidities : DM, CKD, OA, h/o kidney transplant, h/o L TKA  Patient is currently functioning near baseline with toileting, upper body dressing, lower body dressing, grooming, bed mobility, transfers, static sitting balance, dynamic sitting balance, static standing balance, dynamic standing balance, and functional standing tolerance.  Prior to admission, patient's baseline is independent.  Patient met all OT goals at supervision level.  Patient reports no further questions/concerns at this time.     Patient will benefit from continued skilled OT Services at discharge to promote prior level of function.  Anticipate patient will return home with home health OT    Recommendations for nursing staff:   Transfers: 1 person , RW  Toileting location: Toilet    EVALUATION SESSION:  Patient at start of session: chair    FUNCTIONAL TRANSFER ASSESSMENT  Sit to Stand: Edge of Bed; Chair  Edge of Bed: Supervision  Chair: Supervision  Toilet Transfer: Supervision    BED MOBILITY  Sit to Supine (OT): Supervision  Scooting: independent    BALANCE ASSESSMENT     FUNCTIONAL ADL ASSESSMENT  Grooming Standing: Supervision  UB Dressing Seated: Independent  LB Dressing Seated: Supervision  LB Dressing Standing: Supervision  Toileting Seated: Independent  Toileting Standing: Supervision    ACTIVITY TOLERANCE: stable on room air               O2 SATURATIONS       COGNITION  Overall Cognitive Status:  WFL - within functional limits    COGNITION ASSESSMENTS     Upper Extremity:   ROM: within functional limits    Strength: is within functional limits   Coordination:  Gross motor: wfl  Fine motor: wfl  Sensation: no deficits reported    EDUCATION PROVIDED  Patient Education : Role of Occupational Therapy; Plan of Care; Functional Transfer Techniques; Discharge Recommendations; Energy Conservation; Surgical Precautions; Fall Prevention  Patient's Response to Education: Verbalized Understanding; Returned Demonstration    Equipment used: rw, gait belt  Demonstrates functional use    Therapist comments:   OT educated patient on OT role , session goals, energy conservation sequencing, and safety for ADLs, Patient dressed upper and lower body with supervision, except needed assist to don R sock. Toileting, standing grooming, and functional mobility to simulate household distances was completed with supervision. Patient requested to go back to bed; completed with supervision. Polar ice applied. Patient educated to call not fall, and to perform ankles pumps . Understanding voiced.      Patient End of Session: Hospital anti-slip socks;All patient questions and concerns addressed;RN aware of session/findings;Call light within reach;Needs met;In bed;Ice applied    OCCUPATIONAL PROFILE    HOME SITUATION  Type of Home: House     Lives With: Spouse;Other (Comment) (cat)    Toilet and Equipment: Comfort height toilet  Shower/Tub and Equipment: Walk-in shower       Occupation/Status: retired        Patient Regularly Uses: Glasses    Prior Level of Function:   Independent ; lives with her spouse and a cat in a house, drives.  Spouse is able to assist if needed    SUBJECTIVE  \"This feels better than the last time.\"    PAIN ASSESSMENT  Ratin        OBJECTIVE  Precautions: None  Fall Risk: Standard fall risk    WEIGHT BEARING RESTRICTION  R Lower Extremity: Weight Bearing as Tolerated      AM-PAC ‘6-Clicks’ Inpatient Daily Activity Short Form  -   Putting on and taking off regular lower body clothing?: A Little  -   Bathing (including  washing, rinsing, drying)?: A Little  -   Toileting, which includes using toilet, bedpan or urinal? : A Little  -   Putting on and taking off regular upper body clothing?: None  -   Taking care of personal grooming such as brushing teeth?: None  -   Eating meals?: None    AM-PAC Score:  Score: 21  Approx Degree of Impairment: 32.79%  Standardized Score (AM-PAC Scale): 44.27    ADDITIONAL TESTS     NEUROLOGICAL FINDINGS      PLAN   Patient has been evaluated and presents with no skilled Occupational Therapy needs at this time.  Patient discharged from Occupational Therapy services.  Please re-order if a new functional limitation presents during this admission.    OT Device Recommendations: None    Patient Evaluation Complexity Level:   Occupational Profile/Medical History LOW - Brief history including review of medical or therapy records    Specific performance deficits impacting engagement in ADL/IADL LOW  1 - 3 performance deficits    Client Assessment/Performance Deficits MODERATE - Comorbidities and min to mod modifications of tasks    Clinical Decision Making LOW - Analysis of occupational profile, problem-focused assessments, limited treatment options    Overall Complexity LOW     OT Session Time: 30 minutes  Self-Care Home Management: 28 minutes

## 2025-01-07 NOTE — PROGRESS NOTES
[facility]   part of [facility]        Brinda Max Patient Status:  Outpatient in a Bed    1960 MRN [id number]   Location [facility] 3SW-A Attending Tony Lopez MD   Hosp Day # 0 PCP Curtis Huber DO       Subjective:    POD #1 s/p right total knee arthroplasty  No major complaints.  No calf pain, CP, SOB, lightheadedness, nausea.  Hyperkalemic this morning - recheck this afternoon    Physical Exam:  Temp:  [97 °F (36.1 °C)-98.7 °F (37.1 °C)] 98.2 °F (36.8 °C)  Pulse:  [61-91] 69  Resp:  [13-20] 20  BP: ()/(48-90) 132/60  SpO2:  [93 %-100 %] 100 %    In no acute distress  Knee Aquacel dressing is clean and dry.  No signs of infection  Thigh and leg are soft.  Both calves are NT.  No signs of DVT.  NVI +PF/DF +PP    Data review:  Post op x-ray reviewed.    Recent Labs   Lab 25  0516   RBC 3.48*   HGB 10.6*   HCT 31.3*   MCV 89.9   MCH 30.5   MCHC 33.9   RDW 13.0   WBC 14.5*   .0         Recent Labs   Lab 25  0516   *   BUN 34*   CREATSERUM 1.69*   EGFRCR 33*   CA 10.1   ALB 3.8      K 5.7*      CO2 23.0   ALKPHO 101   AST 16   ALT 7*   BILT 0.5   TP 6.1       S/p Right TKR      Today's plan discussed.  Significance of achieving good ROM in a timely fashion explained.  PT/OT  DVT prophylaxis with  aspirin.  Anticipate DC to home.      Korina Hinds PA-C

## 2025-01-07 NOTE — PLAN OF CARE
Alert and oriented x4. VSS. On RA. . IS encouraged.  SCDs on BLE. Tolerating diet. Voiding without difficulty. Pain controlled with scheduled medications. Aquacel dressing to right knee, C/D/I. Gel ice and spandagrip in place. Ambulating with min assist. Plan is PT/OT to see.

## 2025-01-07 NOTE — PLAN OF CARE
Patient alert and oriented x4. VSS, on RA. No pain reported at rest, scheduled medications given. Patient reports chronic numbness to right great toe, no change from baseline. Aquacel dressing to right knee, CDI. Spandagrip compression wrap in place. Gel ice wrap. Voiding without difficulty in the bathroom. Patient tolerating PO intake, denies N/V. Up x1 person assist with walker.       Plan: lab re-check at 1400,discharge home when cleared.

## 2025-01-07 NOTE — PHYSICAL THERAPY NOTE
PHYSICAL THERAPY EVALUATION - INPATIENT     Room Number: 378/378-A  Evaluation Date: 2025  Type of Evaluation: Initial  Physician Order: PT Eval and Treat    Presenting Problem: s/p R TKA 25  Co-Morbidities : DM, CKD, OA, h/o kidney transplant, h/o L TKA  Reason for Therapy: Mobility Dysfunction and Discharge Planning    PHYSICAL THERAPY ASSESSMENT   Patient is a 64 year old female admitted 2025 for elective R TKA.   Patient is currently functioning near baseline with bed mobility, transfers, gait, and stair negotiation. Prior to admission, patient's baseline is MOD I.     Patient will benefit from continued skilled PT Services at discharge to promote prior level of function.  Anticipate patient will return home with OP PT.    PLAN  Patient has been evaluated and presents with no skilled Physical Therapy needs at this time.  Patient discharged from Physical Therapy services.  Please re-order if a new functional limitation presents during this admission.    PT Device Recommendation: Rolling walker    GOALS  Patient was able to achieve the following goals ...    Patient was able to transfer Safely and independently   Patient able to ambulate on level surfaces Safely and independently     HOME SITUATION  Type of Home: House  Home Layout: One level  Stairs to Enter : 2   Railing: Yes              Lives With: Spouse;Other (Comment) (cat)    Drives: Yes   Patient Regularly Uses: Glasses     Prior Level of [place]: Pt lives with spouse in single story home. Pt independent with ADL and mobility. Pt does not use RW or cane. Pt enjoys gardening and attending boarding pass games.     SUBJECTIVE  \"I hope I'm done after this!\"     OBJECTIVE  Precautions: None  Fall Risk: Standard fall risk    WEIGHT BEARING RESTRICTION  R Lower Extremity: Weight Bearing as Tolerated    PAIN ASSESSMENT  Ratin  Location: R knee  Management Techniques: Activity promotion;Repositioning    COGNITION  Overall Cognitive Status:   WFL - within functional limits    RANGE OF MOTION AND STRENGTH ASSESSMENT  Upper extremity ROM and strength are within functional limits     Lower extremity ROM is within functional limits   R knee flex 95 degrees  R knee ext lacking 10 degrees     Lower extremity strength is within functional limits     BALANCE  Static Sitting: Good  Dynamic Sitting: Good  Static Standing: Fair -  Dynamic Standing: Fair -    ADDITIONAL TESTS                                    ACTIVITY TOLERANCE                         O2 WALK       NEUROLOGICAL FINDINGS                        AM-PAC '6-Clicks' INPATIENT SHORT FORM - BASIC MOBILITY  How much difficulty does the patient currently have...  Patient Difficulty: Turning over in bed (including adjusting bedclothes, sheets and blankets)?: A Little   Patient Difficulty: Sitting down on and standing up from a chair with arms (e.g., wheelchair, bedside commode, etc.): A Little   Patient Difficulty: Moving from lying on back to sitting on the side of the bed?: A Little   How much help from another person does the patient currently need...   Help from Another: Moving to and from a bed to a chair (including a wheelchair)?: A Little   Help from Another: Need to walk in hospital room?: A Little   Help from Another: Climbing 3-5 steps with a railing?: A Little       AM-PAC Score:  Raw Score: 18   Approx Degree of Impairment: 46.58%   Standardized Score (AM-PAC Scale): 43.63   CMS Modifier (G-Code): CK    FUNCTIONAL ABILITY STATUS  Gait Assessment   Functional Mobility/Gait Assessment  Gait Assistance: Supervision  Distance (ft): 250  Assistive Device: Rolling walker  Stairs: Stairs;Car transfer  How Many Stairs: 4  Assist: Supervision  Car transfer: supervision    Skilled Therapy Provided   Supervision for bed mobility.   VC for transfer set up to RW.   Sit-stand to RW with supervision.   Ambulatory with RW and supervision.   Ambulates with steady step through gait pattern.   VC for posture and  terminal knee ext.   Demos good sequencing with RW.   Ascended/descended 4 stairs safely with supervision.   VC for sequencing.   Able to perform simulated car transfer with supervision.   VC for technique.   Returned to room up in bedside chair.     Bed Mobility:  Rolling: supervision  Supine to sit: supervision   Sit to supine: NT     Transfer Mobility:  Sit to stand: supervision   Stand to sit: supervision  Gait = supervision    Therapist's comments:Reviewed activity recommendations.     Exercise/Education Provided:  Bed mobility  Energy conservation  Functional activity tolerated  Gait training  Strengthening  Transfer training    Patient End of Session: Up in chair;Needs met;Call light within reach;RN aware of session/findings;All patient questions and concerns addressed;[facility] anti-slip socks    Patient Evaluation Complexity Level:  History Moderate - 1 or 2 personal factors and/or co-morbidities   Examination of body systems Low -  addressing 1-2 elements   Clinical Presentation Low- Stable   Clinical Decision Making Low Complexity       PT Session Time: 25 minutes  Gait Training: 10 minutes  Therapeutic Activity:  minutes  Neuromuscular Re-education:  minutes  Therapeutic Exercise:  minutes

## 2025-01-07 NOTE — PROGRESS NOTES
AVS reviewed, IV dc'd, discharge video viewed, will dc home w/ outpt PT , verbalized understanding of dc instructions , follow-up appts listed.

## 2025-01-07 NOTE — PROGRESS NOTES
[facility]   part of [facility]     Hospitalist Progress Note     Brinda Max Patient Status:  Outpatient in a Bed    1960 MRN [id number]   Location [facility] 3SW-A Attending Tony Lopez MD   Hosp Day # 0 PCP Curtis Huber DO     Subjective:   Feels great     Objective:    Review of Systems:   A comprehensive review of systems was completed; pertinent positive and negatives stated in subjective.  Vital signs:  Temp:  [97 °F (36.1 °C)-98.7 °F (37.1 °C)] 98.4 °F (36.9 °C)  Pulse:  [61-91] 61  Resp:  [13-19] 18  BP: ()/(48-90) 112/60  SpO2:  [93 %-100 %] 93 %  Physical Exam:    General: No acute distress    Respiratory: no wheezes, no rhonchi  Cardiovascular: S1, S2, RRR  Abdomen: Soft, NT/ND, +BS  Extremities: no edema    Diagnostic Data:    Labs:  Recent Labs   Lab 25  0516   WBC 14.5*   HGB 10.6*   MCV 89.9   .0     Recent Labs   Lab 25  0516   *   BUN 34*   CREATSERUM 1.69*   CA 10.1   ALB 3.8      K 5.7*      CO2 23.0   ALKPHO 101   AST 16   ALT 7*   BILT 0.5   TP 6.1     Estimated Creatinine Clearance: 24.7 mL/min (A) (based on SCr of 1.69 mg/dL (H)).  No results for input(s): \"PTP\", \"INR\" in the last 168 hours.     Microbiology  No results found for this visit on 25.  Imaging: Reviewed in Epic.  Medications:    aspirin  81 mg Oral BID    sennosides  17.2 mg Oral Nightly    docusate sodium  100 mg Oral BID    famotidine  20 mg Oral BID    Or    famotidine  20 mg Intravenous BID    dexAMETHasone PF  8 mg Intravenous Once    traMADol  50 mg Oral 4 times per day    atorvastatin  20 mg Oral QPM    cycloSPORINE non-modified  50 mg Oral QPM    cycloSPORINE non-modified  75 mg Oral QAM    losartan  50 mg Oral Daily    mycophenolate mofetil  1,000 mg Oral BID    predniSONE  5 mg Oral Daily    sulfamethoxazole-trimethoprim DS  1 tablet Oral BID    insulin aspart  2-10 Units Subcutaneous TID AC and HS       Assessment & Plan:    #Right Knee  OA s/p TKA  -date of surgery 1/6  -pain management per ortho  -PT/OT    #Hyperkalemia- lokelma and recheck this afternoon     #RPGN s/p renal transplant  #CKD III- near baseline   -cont home cyclosporine, mycophenolate,   -Decadron was given- cont home prednisone      #Leukocytosis- suspect reactive and 2/2 decadron      #HLD  -cont home statin     #HTN  -cont home losartan     #Type II DM- increase from steroids   -ISS         Supplementary Documentation:   Quality:  DVT Mechanical Prophylaxis:   SCDs, Early ambuation  DVT Pharmacologic Prophylaxis   Medication   None         DVT Pharmacologic prophylaxis: Aspirin 81 mg      Code Status: Not on file  Price: No urinary catheter in place  Price Duration (in days):   Central line:    MARTHA:   At this point Ms. Max is expected to be discharge to: home     The 21st Century Cures Act makes medical notes like these available to patients in the interest of transparency. Please be advised this is a medical document. Medical documents are intended to carry relevant information, facts as evident, and the clinical opinion of the practitioner. The medical note is intended as peer to peer communication and may appear blunt or direct. It is written in medical language and may contain abbreviations or verbiage that are unfamiliar.

## 2025-01-13 ENCOUNTER — OFFICE VISIT (OUTPATIENT)
Dept: FAMILY MEDICINE CLINIC | Facility: CLINIC | Age: 65
End: 2025-01-13
Payer: COMMERCIAL

## 2025-01-13 VITALS
DIASTOLIC BLOOD PRESSURE: 66 MMHG | OXYGEN SATURATION: 98 % | TEMPERATURE: 98 F | RESPIRATION RATE: 16 BRPM | WEIGHT: 182.25 LBS | HEART RATE: 59 BPM | SYSTOLIC BLOOD PRESSURE: 120 MMHG | BODY MASS INDEX: 34 KG/M2

## 2025-01-13 DIAGNOSIS — N18.30 DIABETES MELLITUS WITH STAGE 3 CHRONIC KIDNEY DISEASE (HCC): ICD-10-CM

## 2025-01-13 DIAGNOSIS — M17.11 PRIMARY OSTEOARTHRITIS OF RIGHT KNEE: Primary | ICD-10-CM

## 2025-01-13 DIAGNOSIS — E11.22 DIABETES MELLITUS WITH STAGE 3 CHRONIC KIDNEY DISEASE (HCC): ICD-10-CM

## 2025-01-13 DIAGNOSIS — N18.31 CKD STAGE 3A, GFR 45-59 ML/MIN (HCC): ICD-10-CM

## 2025-01-13 DIAGNOSIS — Z96.651 S/P TKR (TOTAL KNEE REPLACEMENT), RIGHT: ICD-10-CM

## 2025-01-13 DIAGNOSIS — E66.01 MORBID (SEVERE) OBESITY DUE TO EXCESS CALORIES (HCC): ICD-10-CM

## 2025-01-13 PROCEDURE — 1111F DSCHRG MED/CURRENT MED MERGE: CPT | Performed by: FAMILY MEDICINE

## 2025-01-13 PROCEDURE — 3074F SYST BP LT 130 MM HG: CPT | Performed by: FAMILY MEDICINE

## 2025-01-13 PROCEDURE — 99214 OFFICE O/P EST MOD 30 MIN: CPT | Performed by: FAMILY MEDICINE

## 2025-01-13 PROCEDURE — 1159F MED LIST DOCD IN RCRD: CPT | Performed by: FAMILY MEDICINE

## 2025-01-13 PROCEDURE — 3078F DIAST BP <80 MM HG: CPT | Performed by: FAMILY MEDICINE

## 2025-01-13 PROCEDURE — G2211 COMPLEX E/M VISIT ADD ON: HCPCS | Performed by: FAMILY MEDICINE

## 2025-01-13 RX ORDER — CELECOXIB 200 MG/1
CAPSULE ORAL
COMMUNITY
Start: 2024-12-31 | End: 2025-01-13 | Stop reason: ALTCHOICE

## 2025-01-13 NOTE — PROGRESS NOTES
Brinda Max is a 64 year old female.  HPI:   Brinda is here for follow up after her RKR 1 week ago, is doing well by medical standards but not by Brinda standards stopped taking the Tramadol, and opted for tylenol arthritis her pain is about a 3 when she is sitting, but goes up to a 7 when she is up and moving. Has been going to PT 2 days a week. She si otherwise doing ok.    Current Outpatient Medications   Medication Sig Dispense Refill    acetaminophen 500 MG Oral Tab Take 2 tablets (1,000 mg total) by mouth 4 (four) times daily. Post op      aspirin 81 MG Oral Tab EC Take 1 tablet (81 mg total) by mouth in the morning and 1 tablet (81 mg total) before bedtime. Post op.      docusate sodium 100 MG Oral Cap Take 1 capsule (100 mg total) by mouth 2 (two) times daily. Post op      traMADol 50 MG Oral Tab Take 1 tablet (50 mg total) by mouth every 6 (six) hours as needed for Pain. Post op.  Take 1 tablet (50 mg total) by mouth every 6 (six) hours as needed for Pain (moderate pain).      LOSARTAN 50 MG Oral Tab TAKE 1 TABLET BY MOUTH DAILY 100 tablet 2    predniSONE 5 MG Oral Tab Take 1 tablet (5 mg total) by mouth daily. 90 tablet 1    Mycophenolate Mofetil 500 MG Oral Tab Take 2 tablets (1,000 mg total) by mouth 2 (two) times daily. 360 tablet 0    cycloSPORINE non-modified 25 MG Oral Cap Take 3 capsules (75 mg total) by mouth every morning.      cycloSPORINE non-modified 25 MG Oral Cap Take 2 capsules (50 mg total) by mouth every evening.      ATORVASTATIN 20 MG Oral Tab TAKE 1 TABLET BY MOUTH DAILY 100 tablet 2    Glucose Blood In Vitro Strip 1 each by Other route daily. 90 each 3    magnesium oxide 400 MG Oral Tab Take 1 tablet (400 mg total) by mouth 2 (two) times daily.      multivitamin Oral Tab Take 1 tablet by mouth daily.      Cinnamon 500 MG Oral Cap Take 1,000 mg by mouth 2 (two) times daily.      Omega 3 1000 MG Oral Cap Take 2,000 mg by mouth 2 (two) times daily.      Cholecalciferol (VITAMIN D)  1000 UNITS Oral Cap Take 1 capsule by mouth 2 (two) times daily.        Past Medical History:    Arthritis    Knees bone on bone    Back problem    Cancer (HCC)    SKIN CANCER    Easy bruising    ASA 81 mg daily    Glomerulonephritis    Hemorrhoids    Pregnancy    High blood pressure    History of COVID-19    fatigue. not hospitalized. no continued symptoms    Kidney failure    Transplant 4/25/02    Osteoarthritis    Other and unspecified hyperlipidemia    Pain in joints    Renal disorder    kidney transplant    Type II or unspecified type diabetes mellitus without mention of complication, not stated as uncontrolled    Unspecified essential hypertension    Visual impairment    glasses / contacts    Wears glasses      Social History:  Social History     Socioeconomic History    Marital status:    Tobacco Use    Smoking status: Never    Smokeless tobacco: Never   Vaping Use    Vaping status: Never Used   Substance and Sexual Activity    Alcohol use: Yes     Comment: occasional    Drug use: No   Other Topics Concern    Caffeine Concern Yes    Exercise No     Social Drivers of Health     Financial Resource Strain: Low Risk  (7/15/2024)    Financial Resource Strain     Difficulty of Paying Living Expenses: Not hard at all     Med Affordability: No   Food Insecurity: No Food Insecurity (1/6/2025)    Food Insecurity     Food Insecurity: Never true   Transportation Needs: No Transportation Needs (1/6/2025)    Transportation Needs     Lack of Transportation: No    [facility]    Social Connections   Housing Stability: Low Risk  (1/6/2025)    Housing Stability     Housing Instability: No        REVIEW OF SYSTEMS:   GENERAL HEALTH: feels well otherwise  SKIN: denies any unusual skin lesions or rashes  RESPIRATORY: denies shortness of breath with exertion  CARDIOVASCULAR: denies chest pain on exertion  GI: denies abdominal pain and denies heartburn  NEURO:  denies headaches    EXAM:   /66   Pulse 59   Temp 97.5 °F (36.4 °C) (Temporal)   Resp 16   Wt 182 lb 4 oz (82.7 kg)   SpO2 98%   BMI 34.44 kg/m²   GENERAL: well developed, well nourished,in no apparent distress  SKIN: no rashes,no suspicious lesions  HEENT: atraumatic, normocephalic,ears and throat are clear  NECK: supple,no adenopathy,no bruits  LUNGS: clear to auscultation  CARDIO: RRR without murmur  GI: good BS's,no masses, HSM or tenderness  EXTREMITIES: no cyanosis, clubbing or edema    ASSESSMENT AND PLAN:     Encounter Diagnoses   Name Primary?    Primary osteoarthritis of right knee Yes    S/P TKR (total knee replacement), right     Morbid (severe) obesity due to excess calories (HCC)     Diabetes mellitus with stage 3 chronic kidney disease (HCC)     CKD stage 3a, GFR 45-59 ml/min (HCC)    KEEP LEG ELEVATED, ICE TAKE 1/2 1 TYLENOL  CONTINUE WITH PT  TRY AND GET SOME EXERCISE   BLOOD SUGAR CONTROL LOOKED GOOD  CONTINUE LOSARTAN 50 MG DAILY   The patient indicates understanding of these issues and agrees to the plan.  The patient is asked to return in 1 month.

## 2025-01-20 ENCOUNTER — OFFICE VISIT (OUTPATIENT)
Dept: SURGERY | Facility: CLINIC | Age: 65
End: 2025-01-20
Payer: COMMERCIAL

## 2025-01-20 VITALS
OXYGEN SATURATION: 99 % | SYSTOLIC BLOOD PRESSURE: 122 MMHG | BODY MASS INDEX: 32.85 KG/M2 | WEIGHT: 174 LBS | HEART RATE: 92 BPM | HEIGHT: 61 IN | DIASTOLIC BLOOD PRESSURE: 60 MMHG

## 2025-01-20 DIAGNOSIS — Z98.1 S/P LUMBAR SPINAL FUSION: Primary | ICD-10-CM

## 2025-01-20 NOTE — PROGRESS NOTES
Established patient:  Reason for follow up:   6 month post op, sx 07/11/24    Numeric Rating Scale:         Pain at Present: 0/10

## 2025-01-20 NOTE — PATIENT INSTRUCTIONS
Refill policies:    Allow 2-3 business days for refills; controlled substances may take longer.  Contact your pharmacy at least 5 days prior to running out of medication and have them send an electronic request or submit request through the “request refill” option in your [employer] account.  Refills are not addressed on weekends; covering physicians do not authorize routine medications on weekends.  No narcotics or controlled substances are refilled after noon on Fridays or by on call physicians.  By law, narcotics must be electronically prescribed.  A 30 day supply with no refills is the maximum allowed.  If your prescription is due for a refill, you may be due for a follow up appointment.  To best provide you care, patients receiving routine medications need to be seen at least once a year.  Patients receiving narcotic/controlled substance medications need to be seen at least once every 3 months.  In the event that your preferred pharmacy does not have the requested medication in stock (e.g. Backordered), it is your responsibility to find another pharmacy that has the requested medication available.  We will gladly send a new prescription to that pharmacy at your request.    Scheduling Tests:    If your physician has ordered radiology tests such as MRI or CT scans, please contact Central Scheduling at [phone] right away to schedule the test.  Once scheduled, the [facility] Centralized Referral Team will work with your insurance carrier to obtain pre-certification or prior authorization.  Depending on your insurance carrier, approval may take 3-10 days.  It is highly recommended patients assure they have received an authorization before having a test performed.  If test is done without insurance authorization, patient may be responsible for the entire amount billed.      Precertification and Prior Authorizations:  If your physician has recommended that you have a procedure or additional testing performed the [facility]  Centralized Referral Team will contact your insurance carrier to obtain pre-certification or prior authorization.    You are strongly encouraged to contact your insurance carrier to verify that your procedure/test has been approved and is a COVERED benefit.  Although the [facility] Centralized Referral Team does its due diligence, the insurance carrier gives the disclaimer that \"Although the procedure is authorized, this does not guarantee payment.\"    Ultimately the patient is responsible for payment.   Thank you for your understanding in this matter.  Paperwork Completion:  If you require FMLA or disability paperwork for your recovery, please make sure to either drop it off or have it faxed to our office at [phone]. Be sure the form has your name and date of birth on it.  The form will be faxed to our Forms Department and they will complete it for you.  There is a 25$ fee for all forms that need to be filled out.  Please be aware there is a 10-14 day turnaround time.  You will need to sign a release of information (CHANDRA) form if your paperwork does not come with one.  You may call the Forms Department with any questions at [phone].  Their fax number is [phone].

## 2025-01-20 NOTE — PROGRESS NOTES
formerly [facility]  Neurological Surgery Clinic Note    Brinda Max  4/18/1960  [id number]  PCP: Curtis Huber DO    REASON FOR VISIT:  S/p L spine fusion    HISTORY OF PRESENT ILLNESS:  Brinda Max is a(n) 64 year old female s/p L spine fusion.  She is doing well. Denies any new symptoms.  Recent R knee surgery.       Location: low back pain  Quality: dull  Severity:  improved  Duration: since surgery  Timing: any  Context: s/p L spine fusion  Modifying Factors: s/p L spine fusion   Associated signs/symptoms: pain      PAST MEDICAL HISTORY:  Past Medical History:    Arthritis    Knees bone on bone    Back problem    Cancer (HCC)    SKIN CANCER    Easy bruising    ASA 81 mg daily    Glomerulonephritis    Hemorrhoids    Pregnancy    High blood pressure    History of COVID-19    fatigue. not hospitalized. no continued symptoms    Kidney failure    Transplant 4/25/02    Osteoarthritis    Other and unspecified hyperlipidemia    Pain in joints    Renal disorder    kidney transplant    Type II or unspecified type diabetes mellitus without mention of complication, not stated as uncontrolled    Unspecified essential hypertension    Visual impairment    glasses / contacts    Wears glasses       PAST SURGICAL HISTORY:  Past Surgical History:   Procedure Laterality Date    Colonoscopy  2013, 2017    Lumbar spine surgery  07/11/2024    anterior lumbar 4- lumbar 5, lumbar 5-sacral 1 discectomy and fusion with bone graft, cage, and screws    Organ transplant  2002    Kidney    Other surgical history  age 42    Renal transplant    Tonsillectomy  1964    Total knee replacement Right 01/06/2025    Transplantation of kidney      4/25/02 at [place]       FAMILY HISTORY:  family history includes Diabetes in her mother; Other in her mother and mother; Other (age of onset: 54) in her self.    SOCIAL HISTORY:   reports that she has never smoked. She has never used smokeless tobacco. She reports current alcohol use.  She reports that she does not use drugs.    ALLERGIES:  Allergies[1]    MEDICATIONS:  Medications Ordered Prior to Encounter[2]    REVIEW OF SYSTEMS:  Review of Systems   All other systems reviewed and are negative.       PHYSICAL EXAMINATION:  Neurological Exam  Mental Status  Awake, alert and oriented to person, place and time.    Cranial Nerves  CN II: Visual acuity is normal. Visual fields full to confrontation.  CN III, IV, VI: Extraocular movements intact bilaterally. Normal lids and orbits bilaterally. Pupils equal round and reactive to light bilaterally.  CN V: Facial sensation is normal.  CN VII: Full and symmetric facial movement.  CN VIII: Hearing is normal.  CN IX, X: Palate elevates symmetrically. Normal gag reflex.  CN XI: Shoulder shrug strength is normal.  CN XII: Tongue midline without atrophy or fasciculations.    Motor  Normal muscle bulk throughout. No fasciculations present. Normal muscle tone. No abnormal involuntary movements. Strength is 5/5 throughout all four extremities.    Sensory  Sensation is intact to light touch, pinprick, vibration and proprioception in all four extremities.    Reflexes  Deep tendon reflexes are 2+ and symmetric in all four extremities.    Coordination    Finger-to-nose, rapid alternating movements and heel-to-shin normal bilaterally without dysmetria.    Gait  Normal casual, toe, heel and tandem gait.       IMAGING:  none    ASSESSMENT:  S/p L spine    Plan:  F/U in 6 months as needed      The plan of care was discussed with the patient at length. All questions and concerns were addressed at this time. The patient verbalized agreement with the plan and was appreciative.     Total visit time: 30 minutes; More than 50% spent coordinating care, counseling, reviewing imaging and discussing medication therapy.     Kole Hdez,   Neurological Surgery  [facility]           [1]   Allergies  Allergen Reactions    Adhesive Tape OTHER (SEE COMMENTS)      Caused redness after being on for a length of time    Ace Inhibitors Coughing   [2]   Current Outpatient Medications on File Prior to Visit   Medication Sig Dispense Refill    acetaminophen 500 MG Oral Tab Take 2 tablets (1,000 mg total) by mouth 4 (four) times daily. Post op      aspirin 81 MG Oral Tab EC Take 1 tablet (81 mg total) by mouth in the morning and 1 tablet (81 mg total) before bedtime. Post op.      docusate sodium 100 MG Oral Cap Take 1 capsule (100 mg total) by mouth 2 (two) times daily. Post op      traMADol 50 MG Oral Tab Take 1 tablet (50 mg total) by mouth every 6 (six) hours as needed for Pain. Post op.  Take 1 tablet (50 mg total) by mouth every 6 (six) hours as needed for Pain (moderate pain).      LOSARTAN 50 MG Oral Tab TAKE 1 TABLET BY MOUTH DAILY 100 tablet 2    predniSONE 5 MG Oral Tab Take 1 tablet (5 mg total) by mouth daily. 90 tablet 1    Mycophenolate Mofetil 500 MG Oral Tab Take 2 tablets (1,000 mg total) by mouth 2 (two) times daily. 360 tablet 0    cycloSPORINE non-modified 25 MG Oral Cap Take 3 capsules (75 mg total) by mouth every morning.      cycloSPORINE non-modified 25 MG Oral Cap Take 2 capsules (50 mg total) by mouth every evening.      ATORVASTATIN 20 MG Oral Tab TAKE 1 TABLET BY MOUTH DAILY 100 tablet 2    Glucose Blood In Vitro Strip 1 each by Other route daily. 90 each 3    magnesium oxide 400 MG Oral Tab Take 1 tablet (400 mg total) by mouth 2 (two) times daily.      multivitamin Oral Tab Take 1 tablet by mouth daily.      Cinnamon 500 MG Oral Cap Take 1,000 mg by mouth 2 (two) times daily.      Omega 3 1000 MG Oral Cap Take 2,000 mg by mouth 2 (two) times daily.      Cholecalciferol (VITAMIN D) 1000 UNITS Oral Cap Take 1 capsule by mouth 2 (two) times daily.       No current facility-administered medications on file prior to visit.

## 2025-02-03 ENCOUNTER — OFFICE VISIT (OUTPATIENT)
Dept: FAMILY MEDICINE CLINIC | Facility: CLINIC | Age: 65
End: 2025-02-03
Payer: COMMERCIAL

## 2025-02-03 VITALS
BODY MASS INDEX: 34 KG/M2 | TEMPERATURE: 98 F | HEART RATE: 58 BPM | WEIGHT: 177.38 LBS | OXYGEN SATURATION: 95 % | SYSTOLIC BLOOD PRESSURE: 128 MMHG | RESPIRATION RATE: 16 BRPM | DIASTOLIC BLOOD PRESSURE: 68 MMHG

## 2025-02-03 DIAGNOSIS — Z96.651 S/P TKR (TOTAL KNEE REPLACEMENT), RIGHT: ICD-10-CM

## 2025-02-03 DIAGNOSIS — E66.01 MORBID (SEVERE) OBESITY DUE TO EXCESS CALORIES (HCC): ICD-10-CM

## 2025-02-03 DIAGNOSIS — M17.11 PRIMARY OSTEOARTHRITIS OF RIGHT KNEE: Primary | ICD-10-CM

## 2025-02-03 PROCEDURE — 3078F DIAST BP <80 MM HG: CPT | Performed by: FAMILY MEDICINE

## 2025-02-03 PROCEDURE — 1111F DSCHRG MED/CURRENT MED MERGE: CPT | Performed by: FAMILY MEDICINE

## 2025-02-03 PROCEDURE — 99214 OFFICE O/P EST MOD 30 MIN: CPT | Performed by: FAMILY MEDICINE

## 2025-02-03 PROCEDURE — 1159F MED LIST DOCD IN RCRD: CPT | Performed by: FAMILY MEDICINE

## 2025-02-03 PROCEDURE — 3074F SYST BP LT 130 MM HG: CPT | Performed by: FAMILY MEDICINE

## 2025-02-03 NOTE — PROGRESS NOTES
Brinda Max is a 64 year old female.  HPI:   Brinda is here for follow up after her RKR 4 week ago, is doing well until she tried to do some extra steps yesterday and now her knee is painful and a bit swollen.  Has been going to PT 2 days a week. She si otherwise doing ok.  Otherwise has not had any issues .  Rarely takes the tramadol and bid ASA,   Current Outpatient Medications   Medication Sig Dispense Refill    acetaminophen 500 MG Oral Tab Take 2 tablets (1,000 mg total) by mouth 4 (four) times daily. Post op      aspirin 81 MG Oral Tab EC Take 1 tablet (81 mg total) by mouth in the morning and 1 tablet (81 mg total) before bedtime. Post op.      LOSARTAN 50 MG Oral Tab TAKE 1 TABLET BY MOUTH DAILY 100 tablet 2    predniSONE 5 MG Oral Tab Take 1 tablet (5 mg total) by mouth daily. 90 tablet 1    Mycophenolate Mofetil 500 MG Oral Tab Take 2 tablets (1,000 mg total) by mouth 2 (two) times daily. 360 tablet 0    cycloSPORINE non-modified 25 MG Oral Cap Take 3 capsules (75 mg total) by mouth every morning.      cycloSPORINE non-modified 25 MG Oral Cap Take 2 capsules (50 mg total) by mouth every evening.      ATORVASTATIN 20 MG Oral Tab TAKE 1 TABLET BY MOUTH DAILY 100 tablet 2    Glucose Blood In Vitro Strip 1 each by Other route daily. 90 each 3    magnesium oxide 400 MG Oral Tab Take 1 tablet (400 mg total) by mouth 2 (two) times daily.      multivitamin Oral Tab Take 1 tablet by mouth daily.      Cinnamon 500 MG Oral Cap Take 1,000 mg by mouth 2 (two) times daily.      Omega 3 1000 MG Oral Cap Take 2,000 mg by mouth 2 (two) times daily.      Cholecalciferol (VITAMIN D) 1000 UNITS Oral Cap Take 1 capsule by mouth 2 (two) times daily.        Past Medical History:    Arthritis    Knees bone on bone    Back problem    Cancer (HCC)    SKIN CANCER    Easy bruising    ASA 81 mg daily    Glomerulonephritis    Hemorrhoids    Pregnancy    High blood pressure    History of COVID-19    fatigue. not hospitalized. no  continued symptoms    Kidney failure    Transplant 4/25/02    Osteoarthritis    Other and unspecified hyperlipidemia    Pain in joints    Renal disorder    kidney transplant    Type II or unspecified type diabetes mellitus without mention of complication, not stated as uncontrolled    Unspecified essential hypertension    Visual impairment    glasses / contacts    Wears glasses      Social History:  Social History     Socioeconomic History    Marital status:    Tobacco Use    Smoking status: Never    Smokeless tobacco: Never   Vaping Use    Vaping status: Never Used   Substance and Sexual Activity    Alcohol use: Yes     Comment: occasional    Drug use: Never   Other Topics Concern    Caffeine Concern Yes    Exercise No     Social Drivers of Health     Financial Resource Strain: Low Risk  (7/15/2024)    Financial Resource Strain     Difficulty of Paying Living Expenses: Not hard at all     Med Affordability: No   Food Insecurity: No Food Insecurity (1/6/2025)    Food Insecurity     Food Insecurity: Never true   Transportation Needs: No Transportation Needs (1/6/2025)    Transportation Needs     Lack of Transportation: No    [facility]    Social Connections   Housing Stability: Low Risk  (1/6/2025)    Housing Stability     Housing Instability: No        REVIEW OF SYSTEMS:   GENERAL HEALTH: feels well otherwise  HEENT: denies any issues swallowing  SKIN: denies any unusual skin lesions or rashes  RESPIRATORY: denies shortness of breath with exertion  CARDIOVASCULAR: denies chest pain on exertion  GI: denies abdominal pain and denies heartburn  NEURO: denies headaches  EXT: right knee pain  and swelling 4 weeks post op, due to overactivity  EXAM:   /68   Pulse 58   Temp 98 °F (36.7 °C) (Temporal)   Resp 16   Wt 177 lb 6 oz (80.5 kg)   SpO2 95%   BMI 33.51 kg/m²   GENERAL: well developed, well nourished,in no apparent distress  SKIN: no  rashes,no suspicious lesions, incision is clean dry and intact  HEENT: atraumatic, normocephalic,ears and throat are clear  NECK: supple,no adenopathy,no bruits  LUNGS: clear to auscultation  CARDIO: RRR without murmur  GI: good BS's,no masses, HSM or tenderness  EXTREMITIES: no cyanosis, clubbing the is non -pitting  edema    ASSESSMENT AND PLAN:     Encounter Diagnoses   Name Primary?    Primary osteoarthritis of right knee Yes    S/P TKR (total knee replacement), right     Morbid (severe) obesity due to excess calories (HCC)    KEEP LEG ELEVATED, ICE TAKE 1/2 1 TYLENOL  CONTINUE WITH PT  Lets be careful about overdoing it   BLOOD SUGAR CONTROL LOOKED GOOD  CONTINUE LOSARTAN 50 MG DAILY   The patient indicates understanding of these issues and agrees to the plan.  The patient is asked to return in 2 months.

## 2025-02-25 DIAGNOSIS — E78.2 MIXED HYPERLIPIDEMIA: ICD-10-CM

## 2025-02-25 RX ORDER — ATORVASTATIN CALCIUM 20 MG/1
20 TABLET, FILM COATED ORAL DAILY
Qty: 90 TABLET | Refills: 1 | Status: SHIPPED | OUTPATIENT
Start: 2025-02-25

## 2025-04-13 DIAGNOSIS — I10 ESSENTIAL HYPERTENSION: ICD-10-CM

## 2025-04-13 DIAGNOSIS — N18.30 STAGE 3 CHRONIC KIDNEY DISEASE, UNSPECIFIED WHETHER STAGE 3A OR 3B CKD (HCC): ICD-10-CM

## 2025-04-13 DIAGNOSIS — Z94.0 RENAL TRANSPLANT RECIPIENT (HCC): ICD-10-CM

## 2025-04-14 RX ORDER — PREDNISONE 5 MG/1
5 TABLET ORAL DAILY
Qty: 100 TABLET | Refills: 2 | Status: SHIPPED | OUTPATIENT
Start: 2025-04-14

## 2025-04-14 RX ORDER — MYCOPHENOLATE MOFETIL 500 MG/1
1000 TABLET ORAL 2 TIMES DAILY
Qty: 360 TABLET | Refills: 3 | Status: SHIPPED | OUTPATIENT
Start: 2025-04-14

## 2025-04-25 ENCOUNTER — LABORATORY ENCOUNTER (OUTPATIENT)
Dept: LAB | Age: 65
End: 2025-04-25
Attending: INTERNAL MEDICINE
Payer: MEDICARE

## 2025-04-25 DIAGNOSIS — N18.30 STAGE 3 CHRONIC KIDNEY DISEASE, UNSPECIFIED WHETHER STAGE 3A OR 3B CKD (HCC): ICD-10-CM

## 2025-04-25 DIAGNOSIS — N18.31 CKD STAGE 3A, GFR 45-59 ML/MIN (HCC): ICD-10-CM

## 2025-04-25 DIAGNOSIS — I10 ESSENTIAL HYPERTENSION: ICD-10-CM

## 2025-04-25 DIAGNOSIS — Z94.0 RENAL TRANSPLANT RECIPIENT (HCC): ICD-10-CM

## 2025-04-25 LAB
ALBUMIN SERPL-MCNC: 4.4 G/DL (ref 3.2–4.8)
ALBUMIN/GLOB SERPL: 2 {RATIO} (ref 1–2)
ALP LIVER SERPL-CCNC: 108 U/L (ref 55–142)
ALT SERPL-CCNC: 8 U/L (ref 10–49)
ANION GAP SERPL CALC-SCNC: 13 MMOL/L (ref 0–18)
AST SERPL-CCNC: 17 U/L (ref ?–34)
BASOPHILS # BLD AUTO: 0.08 X10(3) UL (ref 0–0.2)
BASOPHILS NFR BLD AUTO: 1.1 %
BILIRUB SERPL-MCNC: 0.5 MG/DL (ref 0.2–1.1)
BILIRUB UR QL STRIP.AUTO: NEGATIVE
BUN BLD-MCNC: 27 MG/DL (ref 9–23)
CALCIUM BLD-MCNC: 10 MG/DL (ref 8.7–10.6)
CHLORIDE SERPL-SCNC: 107 MMOL/L (ref 98–112)
CHOLEST SERPL-MCNC: 146 MG/DL (ref ?–200)
CLARITY UR REFRACT.AUTO: CLEAR
CO2 SERPL-SCNC: 25 MMOL/L (ref 21–32)
CREAT BLD-MCNC: 1.57 MG/DL (ref 0.55–1.02)
CREAT UR-SCNC: 96.2 MG/DL
EGFRCR SERPLBLD CKD-EPI 2021: 36 ML/MIN/1.73M2 (ref 60–?)
EOSINOPHIL # BLD AUTO: 0.15 X10(3) UL (ref 0–0.7)
EOSINOPHIL NFR BLD AUTO: 2 %
ERYTHROCYTE [DISTWIDTH] IN BLOOD BY AUTOMATED COUNT: 12.5 %
FASTING PATIENT LIPID ANSWER: YES
FASTING STATUS PATIENT QL REPORTED: YES
GLOBULIN PLAS-MCNC: 2.2 G/DL (ref 2–3.5)
GLUCOSE BLD-MCNC: 92 MG/DL (ref 70–99)
GLUCOSE UR STRIP.AUTO-MCNC: NORMAL MG/DL
HCT VFR BLD AUTO: 36.3 % (ref 35–48)
HDLC SERPL-MCNC: 53 MG/DL (ref 40–59)
HGB BLD-MCNC: 11.3 G/DL (ref 12–16)
IMM GRANULOCYTES # BLD AUTO: 0.02 X10(3) UL (ref 0–1)
IMM GRANULOCYTES NFR BLD: 0.3 %
KETONES UR STRIP.AUTO-MCNC: NEGATIVE MG/DL
LDLC SERPL CALC-MCNC: 75 MG/DL (ref ?–100)
LEUKOCYTE ESTERASE UR QL STRIP.AUTO: 25
LYMPHOCYTES # BLD AUTO: 2.81 X10(3) UL (ref 1–4)
LYMPHOCYTES NFR BLD AUTO: 38.3 %
MCH RBC QN AUTO: 30.4 PG (ref 26–34)
MCHC RBC AUTO-ENTMCNC: 31.1 G/DL (ref 31–37)
MCV RBC AUTO: 97.6 FL (ref 80–100)
MICROALBUMIN UR-MCNC: 1.3 MG/DL
MICROALBUMIN/CREAT 24H UR-RTO: 13.5 UG/MG (ref ?–30)
MONOCYTES # BLD AUTO: 0.69 X10(3) UL (ref 0.1–1)
MONOCYTES NFR BLD AUTO: 9.4 %
NEUTROPHILS # BLD AUTO: 3.58 X10 (3) UL (ref 1.5–7.7)
NEUTROPHILS # BLD AUTO: 3.58 X10(3) UL (ref 1.5–7.7)
NEUTROPHILS NFR BLD AUTO: 48.9 %
NITRITE UR QL STRIP.AUTO: NEGATIVE
NONHDLC SERPL-MCNC: 93 MG/DL (ref ?–130)
OSMOLALITY SERPL CALC.SUM OF ELEC: 305 MOSM/KG (ref 275–295)
PH UR STRIP.AUTO: 6.5 [PH] (ref 5–8)
PLATELET # BLD AUTO: 268 10(3)UL (ref 150–450)
POTASSIUM SERPL-SCNC: 4.5 MMOL/L (ref 3.5–5.1)
PROT SERPL-MCNC: 6.6 G/DL (ref 5.7–8.2)
PROT UR STRIP.AUTO-MCNC: NEGATIVE MG/DL
RBC # BLD AUTO: 3.72 X10(6)UL (ref 3.8–5.3)
RBC UR QL AUTO: NEGATIVE
SODIUM SERPL-SCNC: 145 MMOL/L (ref 136–145)
SP GR UR STRIP.AUTO: 1.02 (ref 1–1.03)
TRIGL SERPL-MCNC: 95 MG/DL (ref 30–149)
UROBILINOGEN UR STRIP.AUTO-MCNC: NORMAL MG/DL
VLDLC SERPL CALC-MCNC: 15 MG/DL (ref 0–30)
WBC # BLD AUTO: 7.3 X10(3) UL (ref 4–11)

## 2025-04-25 PROCEDURE — 85025 COMPLETE CBC W/AUTO DIFF WBC: CPT

## 2025-04-25 PROCEDURE — 80061 LIPID PANEL: CPT

## 2025-04-25 PROCEDURE — 82570 ASSAY OF URINE CREATININE: CPT

## 2025-04-25 PROCEDURE — 36415 COLL VENOUS BLD VENIPUNCTURE: CPT

## 2025-04-25 PROCEDURE — 82043 UR ALBUMIN QUANTITATIVE: CPT

## 2025-04-25 PROCEDURE — 80158 DRUG ASSAY CYCLOSPORINE: CPT

## 2025-04-25 PROCEDURE — 81001 URINALYSIS AUTO W/SCOPE: CPT

## 2025-04-25 PROCEDURE — 80053 COMPREHEN METABOLIC PANEL: CPT

## 2025-04-25 PROCEDURE — 84443 ASSAY THYROID STIM HORMONE: CPT | Performed by: FAMILY MEDICINE

## 2025-04-28 ENCOUNTER — OFFICE VISIT (OUTPATIENT)
Dept: FAMILY MEDICINE CLINIC | Facility: CLINIC | Age: 65
End: 2025-04-28
Payer: COMMERCIAL

## 2025-04-28 VITALS
RESPIRATION RATE: 16 BRPM | HEART RATE: 63 BPM | TEMPERATURE: 97 F | HEIGHT: 60.25 IN | BODY MASS INDEX: 34.17 KG/M2 | WEIGHT: 176.38 LBS | DIASTOLIC BLOOD PRESSURE: 68 MMHG | OXYGEN SATURATION: 99 % | SYSTOLIC BLOOD PRESSURE: 122 MMHG

## 2025-04-28 DIAGNOSIS — D12.3 BENIGN NEOPLASM OF TRANSVERSE COLON: ICD-10-CM

## 2025-04-28 DIAGNOSIS — Z12.31 VISIT FOR SCREENING MAMMOGRAM: ICD-10-CM

## 2025-04-28 DIAGNOSIS — I10 BENIGN ESSENTIAL HTN: ICD-10-CM

## 2025-04-28 DIAGNOSIS — N18.30 DIABETES MELLITUS WITH STAGE 3 CHRONIC KIDNEY DISEASE (HCC): ICD-10-CM

## 2025-04-28 DIAGNOSIS — M17.0 PRIMARY OSTEOARTHRITIS OF BOTH KNEES: ICD-10-CM

## 2025-04-28 DIAGNOSIS — Z00.00 MEDICARE ANNUAL WELLNESS VISIT, SUBSEQUENT: Primary | ICD-10-CM

## 2025-04-28 DIAGNOSIS — Z86.0101 HISTORY OF ADENOMATOUS POLYP OF COLON: ICD-10-CM

## 2025-04-28 DIAGNOSIS — Z12.31 BREAST CANCER SCREENING BY MAMMOGRAM: ICD-10-CM

## 2025-04-28 DIAGNOSIS — Z00.00 ENCOUNTER FOR ANNUAL HEALTH EXAMINATION: ICD-10-CM

## 2025-04-28 DIAGNOSIS — E11.69 DYSLIPIDEMIA ASSOCIATED WITH TYPE 2 DIABETES MELLITUS (HCC): ICD-10-CM

## 2025-04-28 DIAGNOSIS — E11.22 DIABETES MELLITUS WITH STAGE 3 CHRONIC KIDNEY DISEASE (HCC): ICD-10-CM

## 2025-04-28 DIAGNOSIS — E66.01 MORBID (SEVERE) OBESITY DUE TO EXCESS CALORIES (HCC): ICD-10-CM

## 2025-04-28 DIAGNOSIS — Z00.00 ENCOUNTER FOR MEDICARE ANNUAL WELLNESS EXAM: ICD-10-CM

## 2025-04-28 DIAGNOSIS — E11.9 TYPE 2 DIABETES MELLITUS WITHOUT COMPLICATION, WITHOUT LONG-TERM CURRENT USE OF INSULIN (HCC): ICD-10-CM

## 2025-04-28 DIAGNOSIS — E78.5 DYSLIPIDEMIA ASSOCIATED WITH TYPE 2 DIABETES MELLITUS (HCC): ICD-10-CM

## 2025-04-28 DIAGNOSIS — Z94.0 RENAL TRANSPLANT RECIPIENT (HCC): ICD-10-CM

## 2025-04-28 PROBLEM — M17.11 PRIMARY OSTEOARTHRITIS OF RIGHT KNEE: Status: ACTIVE | Noted: 2025-02-10

## 2025-04-28 LAB
CYCLOSPORINE -MS/MS: 107 NG/ML
TSI SER-ACNC: 1.92 UIU/ML (ref 0.55–4.78)

## 2025-04-28 NOTE — PROGRESS NOTES
Subjective:   Brinda Max is a 65 year old female who presents for a MA AHA (Medicare Advantage Annual Health Assessment) and scheduled follow up of multiple significant but stable problems.   History of Present Illness            is here for their MWV physical, has not been hospitalized the past year. no recent surgery, no new RX meds and no new allergies  Had labs for Dr. Espinoza and looked good, needs to drink a bit more water, her last UFHS was 68, on a statin, had an eye exam 12/2024. Otherwise doing well , recovered from Bilateral knee replacements   History/Other:   Fall Risk Assessment:   She has been screened for Falls and is low risk.      Cognitive Assessment:   She had a completely normal cognitive assessment - see flowsheet entries       Functional Ability/Status:   Brinda Max has some abnormal functions as listed below:  She has Vision problems based on screening of functional status.       Depression Screening (PHQ):  PHQ-2 SCORE: 0  , done 4/21/2025   Last [place] Suicide Screening on 4/28/2025 was No Risk.          Advanced Directives:   She does have a Living Will but we do NOT have it on file in Epic.    She does have a POA but we do NOT have it on file in Epic.    Discussed Advance Care Planning with patient (and family/surrogate if present). Standard forms made available to patient in After Visit Summary.      Patient Active Problem List   Diagnosis    Renal transplant recipient (HCC)    Diabetes mellitus with stage 3 chronic kidney disease (HCC)    Benign essential HTN    Primary osteoarthritis of both knees    Dyslipidemia associated with type 2 diabetes mellitus (HCC)    Type 2 diabetes mellitus without complication, without long-term current use of insulin (HCC)    History of adenomatous polyp of colon    Benign neoplasm of transverse colon    Morbid (severe) obesity due to excess calories ([facility])    Medicare annual wellness visit, subsequent     Allergies:  She is allergic  to adhesive tape and ace inhibitors.    Current Medications:  Outpatient Medications Marked as Taking for the 4/28/25 encounter (Office Visit) with Curtis Huber, DO   Medication Sig    predniSONE 5 MG Oral Tab Take 1 tablet (5 mg total) by mouth daily.    Mycophenolate Mofetil 500 MG Oral Tab Take 2 tablets (1,000 mg total) by mouth 2 (two) times daily.    atorvastatin 20 MG Oral Tab Take 1 tablet (20 mg total) by mouth daily.    acetaminophen 500 MG Oral Tab Take 2 tablets (1,000 mg total) by mouth 4 (four) times daily. Post op    aspirin 81 MG Oral Tab EC Take 1 tablet (81 mg total) by mouth in the morning and 1 tablet (81 mg total) before bedtime. Post op.    LOSARTAN 50 MG Oral Tab TAKE 1 TABLET BY MOUTH DAILY    cycloSPORINE non-modified 25 MG Oral Cap Take 3 capsules (75 mg total) by mouth every morning.    cycloSPORINE non-modified 25 MG Oral Cap Take 2 capsules (50 mg total) by mouth every evening.    Glucose Blood In Vitro Strip 1 each by Other route daily.    magnesium oxide 400 MG Oral Tab Take 1 tablet (400 mg total) by mouth 2 (two) times daily.    multivitamin Oral Tab Take 1 tablet by mouth daily.    Cinnamon 500 MG Oral Cap Take 1,000 mg by mouth 2 (two) times daily.    Omega 3 1000 MG Oral Cap Take 2,000 mg by mouth 2 (two) times daily.    Cholecalciferol (VITAMIN D) 1000 UNITS Oral Cap Take 1 capsule by mouth 2 (two) times daily.       Medical History:  She  has a past medical history of Arthritis (Mid 2006 ?), Back problem, Cancer (HCC), Easy bruising, Glomerulonephritis, Hemorrhoids (1981), High blood pressure, History of COVID-19 (09/18/2023), Kidney failure (Pregnancy post op 1983), Osteoarthritis, Other and unspecified hyperlipidemia, Pain in joints, Renal disorder, Type II or unspecified type diabetes mellitus without mention of complication, not stated as uncontrolled, Unspecified essential hypertension, Visual impairment, and Wears glasses (1980).  Surgical History:  She  has a past  surgical history that includes other surgical history (age 42); transplantation of kidney; colonoscopy (2013, 2017); tonsillectomy (1964); organ transplant (2002); lumbar spine surgery (07/11/2024); and total knee replacement (Right, 01/06/2025).   Family History:  Her family history includes Diabetes in her mother; Other in her mother and mother; Other (age of onset: 54) in her self.  Social History:  She  reports that she has never smoked. She has never used smokeless tobacco. She reports current alcohol use. She reports that she does not use drugs.    Tobacco:  She has never smoked tobacco.    CAGE Alcohol Screen:   CAGE screening score of 0 on 4/21/2025, showing low risk of alcohol abuse.      Patient Care Team:  Curtis Huber DO as PCP - General (Family Practice)  Wicho Espinoza MD (NEPHROLOGY)  Jim Solano, PT as Physical Therapist  Cricket Merritt, PT as Physical Therapist (Physical Therapy)  ELY Hdez DO (NEUROSURGERY)  Roman Copeland, PT as Physical Therapist  Hedy Washington PT as Physical Therapist (Physical Therapy)    Review of Systems  GENERAL: feels well otherwise  SKIN: denies any unusual skin lesions  EYES: denies blurred vision or double vision  HEENT: denies nasal congestion, sinus pain or ST  LUNGS: denies shortness of breath with exertion  CARDIOVASCULAR: denies chest pain on exertion  GI: denies abdominal pain, denies heartburn  : denies dysuria, vaginal discharge or itching, no complaint of urinary incontinence   MUSCULOSKELETAL: denies back pain  NEURO: denies headaches  PSYCHE: denies depression or anxiety  HEMATOLOGIC: denies hx of anemia  ENDOCRINE: denies thyroid history  ALL/ASTHMA: denies hx of allergy or asthma    Objective:   Physical Exam  General Appearance:  Alert, cooperative, no distress, appears stated age   Head:  Normocephalic, without obvious abnormality, atraumatic   Eyes:  PERRL, conjunctiva/corneas clear, EOM's intact both eyes   Ears:  Normal TM's and  external ear canals, both ears   Nose: Nares normal, septum midline,mucosa normal, no drainage or sinus tenderness   Throat: Lips, mucosa, and tongue normal; teeth and gums normal   Neck: Supple, symmetrical, trachea midline, no adenopathy;  thyroid: not enlarged, symmetric, no tenderness/mass/nodules; no carotid bruit or JVD   Back:   Symmetric, no curvature, ROM normal, no CVA tenderness   Lungs:   Clear to auscultation bilaterally, respirations unlabored   Heart:  Regular rate and rhythm, S1 and S2 normal, no murmur, rub, or gallop   Abdomen:   Soft, non-tender, bowel sounds active all four quadrants,  no masses, no organomegaly   Pelvic: Deferred   Extremities: Extremities normal, atraumatic, no cyanosis or edema   Pulses: 2+ and symmetric   Skin: Skin color, texture, turgor normal, no rashes or lesions   Lymph nodes: Cervical, supraclavicular, and axillary nodes normal   Neurologic: Normal, Bilateral barefoot skin diabetic exam is normal, visualized feet and the appearance is normal.  Bilateral monofilament/sensation of both feet is normal.  Pulsation pedal pulse exam of both lower legs/feet is normal as well.           /68   Pulse 63   Temp 97.1 °F (36.2 °C) (Temporal)   Resp 16   Ht 5' 0.25\" (1.53 m)   Wt 176 lb 6 oz (80 kg)   SpO2 99%   BMI 34.16 kg/m²  Estimated body mass index is 34.16 kg/m² as calculated from the following:    Height as of this encounter: 5' 0.25\" (1.53 m).    Weight as of this encounter: 176 lb 6 oz (80 kg).    Medicare Hearing Assessment:   Hearing Screening    Time taken: 4/28/2025  9:15 AM  Screening Method: Finger Rub  Finger Rub Result: Pass         Visual Acuity:   Right Eye Visual Acuity: Uncorrected Right Eye Chart Acuity: 20/20   Left Eye Visual Acuity: Uncorrected Left Eye Chart Acuity: 20/50   Both Eyes Visual Acuity: Uncorrected Both Eyes Chart Acuity: 20/20   Able To Tolerate Visual Acuity: Yes        Assessment & Plan:   Brinda Max is a 65 year old  female who presents for a Medicare Assessment.     1. Medicare annual wellness visit, subsequent (Primary)  -     3D Mammogram Digital Screen, Bilateral (CPT=77067/37667); Future; Expected date: 04/28/2025  -     TSH W Reflex To Free T4  2. Type 2 diabetes mellitus without complication, without long-term current use of insulin (HCC)  -     TSH W Reflex To Free T4  -     Hemoglobin A1C, continue diet and exercise, eye exam UTD, await Hemoglobin A1c, Urine was unremarkable  3. Diabetes mellitus with stage 3 chronic kidney disease (HCC)  -     TSH W Reflex To Free T4, continue Losartan 50 mg daily, sees Dr. Espinoza for renal   4. Dyslipidemia associated with type 2 diabetes mellitus (HCC), atorvastatin 20 mg daily  5. Benign essential HTN, continue Losartan 50 mg daily  6. Renal transplant recipient (HCC), continue cyclosporine, levels pending  7. Morbid (severe) obesity due to excess calories (HCC), has lost some weight, has better mobility since knee replacement   8. Benign neoplasm of transverse colon, not due for colonoscopy for another year  9. History of adenomatous polyp of colon,not due for colonoscopy for another year  10. Primary osteoarthritis of both knees, doing well since replacement  11. Breast cancer screening by mammogram  -     3D Mammogram Digital Screen, Bilateral (CPT=77067/99508); Future; Expected date: 04/28/2025  12. Visit for screening mammogram  -     3D Mammogram Digital Screen, Bilateral (CPT=77067/80427); Future; Expected date: 04/28/2025  13. Encounter for annual health examination  -     3D Mammogram Digital Screen, Bilateral (CPT=77067/73047); Future; Expected date: 04/28/2025  -     TSH W Reflex To Free T4  14. Encounter for Medicare annual wellness exam  -     3D Mammogram Digital Screen, Bilateral (CPT=77067/70008); Future; Expected date: 04/28/2025  -     TSH W Reflex To Free T4  [unfilled]            The patient indicates understanding of these issues and agrees to the  plan.  Reinforced healthy diet, lifestyle, and exercise.      Return in 1 year (on 4/28/2026).     Curtis Huber DO, 4/28/2025     Supplementary Documentation:   General Health:  In the past six months, have you lost more than 10 pounds without trying?: (Patient-Rptd) 2 - No  Has your appetite been poor?: (Patient-Rptd) No  Type of Diet: (Patient-Rptd) Balanced  How does the patient maintain a good energy level?: (Patient-Rptd) Appropriate Exercise  How would you describe your daily physical activity?: (Patient-Rptd) Light  How would you describe your current health state?: (Patient-Rptd) Good  How do you maintain positive mental well-being?: (Patient-Rptd) Puzzles, Games  On a scale of 0 to 10, with 0 being no pain and 10 being severe pain, what is your pain level?: (Patient-Rptd) 0 - (None)  In the past six months, have you experienced urine leakage?: (Patient-Rptd) 0-No  At any time do you feel concerned for the safety/well-being of yourself and/or your children, in your home or elsewhere?: (Patient-Rptd) No  Have you had any immunizations at another office such as Influenza, Hepatitis B, Tetanus, or Pneumococcal?: (Patient-Rptd) No    Health Maintenance   Topic Date Due    Annual Well Visit  01/01/2025    Diabetes Care: Foot Exam (Annual)  01/01/2025    COVID-19 Vaccine (8 - 2024-25 season) 05/28/2026 (Originally 4/10/2025)    Diabetes Care A1C  06/16/2025    Mammogram  09/03/2025    Diabetes Care Dilated Eye Exam  11/21/2025    Diabetes Care: GFR  04/25/2026    Pneumococcal Vaccine: 50+ Years (3 of 3 - PPSV23, PCV20 or PCV21) 09/30/2026    Colorectal Cancer Screening  08/23/2027    Influenza Vaccine  Completed    DEXA Scan  Completed    Annual Depression Screening  Completed    Fall Risk Screening (Annual)  Completed    Diabetes Care: Microalb/Creat Ratio (Annual)  Completed    Zoster Vaccines  Completed    Meningococcal B Vaccine  Aged Out

## 2025-06-16 ENCOUNTER — PATIENT MESSAGE (OUTPATIENT)
Dept: SURGERY | Facility: CLINIC | Age: 65
End: 2025-06-16

## 2025-06-16 NOTE — TELEPHONE ENCOUNTER
Noted that patient has messaged requesting an x-ray order to be placed in her chart.  Patient reports having pain and stiffness every morning.  Patient would like Dr. Hdez to review imaging at her follow-up appointment on 7/7/2025.    Patient underwent surgery on 7/11/2024 with Dr. Hdez:    anterior lumbar 4- lumbar 5, lumbar 5-sacral 1 discectomy and fusion     Per Dr. Hdez at office visit on 1/20/2025:    \"REASON FOR VISIT:  S/p L spine fusion     HISTORY OF PRESENT ILLNESS:  Brinda Max is a(n) 64 year old female s/p L spine fusion.  She is doing well. Denies any new symptoms.  Recent R knee surgery.      Location: low back pain  Quality: dull  Severity:  improved  Duration: since surgery  Timing: any  Context: s/p L spine fusion  Modifying Factors: s/p L spine fusion   Associated signs/symptoms: pain      IMAGING:  none     ASSESSMENT:  S/p L spine     Plan:  F/U in 6 months as needed\"  Routed to DAYNA Stanley.

## 2025-06-17 NOTE — TELEPHONE ENCOUNTER
Lower back stiffness in the absence of trauma is typically not indication for x-ray, as these are likely musculoskeletal in nature. If Dr. Hdez has any concerns during their next visit on 7/7/25, he can order imaging as he sees fit and send her a [employer] message with those results. Please inform patient.

## 2025-06-20 ENCOUNTER — TELEPHONE (OUTPATIENT)
Dept: FAMILY MEDICINE CLINIC | Facility: CLINIC | Age: 65
End: 2025-06-20

## 2025-06-20 NOTE — TELEPHONE ENCOUNTER
Letter sent via [employer] reminding her she has outstanding orders:    Lab Frequency Next Occurrence     08/23/2024   3D Mammogram Digital Screen, Bilateral (CPT=77067/84593) Once 04/28/2025

## 2025-07-07 ENCOUNTER — OFFICE VISIT (OUTPATIENT)
Dept: SURGERY | Facility: CLINIC | Age: 65
End: 2025-07-07
Payer: COMMERCIAL

## 2025-07-07 VITALS
DIASTOLIC BLOOD PRESSURE: 80 MMHG | BODY MASS INDEX: 33.23 KG/M2 | HEIGHT: 61 IN | SYSTOLIC BLOOD PRESSURE: 140 MMHG | HEART RATE: 60 BPM | WEIGHT: 176 LBS

## 2025-07-07 DIAGNOSIS — Z98.1 S/P LUMBAR SPINAL FUSION: Primary | ICD-10-CM

## 2025-07-07 NOTE — PROGRESS NOTES
[facility]  Neurological Surgery Clinic Note    Brinda Max  4/18/1960  [id number]  PCP: Curtis Huber DO    REASON FOR VISIT:  S/p lumbar fusion    HISTORY OF PRESENT ILLNESS:  Brinda Max is a(n) 65 year old female s/p lumbar fusion.  She has done well. She has recently undergone knee replacements as well. She has no weakness. She has some stiffness in her back but otherwise is doing well. States she has improvement with stretching.  Denies any new symptoms.       Location: low back pain  Quality: dull  Severity: intermittent  Duration: since surgery  Timing: in a.m.   Context: s/p lumbar fusion  Modifying Factors: s/p lumbar fusion   Associated signs/symptoms:  none      PAST MEDICAL HISTORY:  Past Medical History[1]    PAST SURGICAL HISTORY:  Past Surgical History[2]    FAMILY HISTORY:  family history includes Diabetes in her mother; Other in her mother and mother; Other (age of onset: 54) in her self.    SOCIAL HISTORY:   reports that she has never smoked. She has never used smokeless tobacco. She reports current alcohol use. She reports that she does not use drugs.    ALLERGIES:  Allergies[3]    MEDICATIONS:  Medications Ordered Prior to Encounter[4]    REVIEW OF SYSTEMS:  Review of Systems   All other systems reviewed and are negative.       PHYSICAL EXAMINATION:  Neurological Exam  Mental Status  Awake, alert and oriented to person, place and time.    Cranial Nerves  CN II: Visual acuity is normal. Visual fields full to confrontation.  CN III, IV, VI: Extraocular movements intact bilaterally. Normal lids and orbits bilaterally. Pupils equal round and reactive to light bilaterally.  CN V: Facial sensation is normal.  CN VII: Full and symmetric facial movement.  CN VIII: Hearing is normal.  CN IX, X: Palate elevates symmetrically. Normal gag reflex.  CN XI: Shoulder shrug strength is normal.  CN XII: Tongue midline without atrophy or fasciculations.    Motor  Normal muscle bulk  throughout. No fasciculations present. Normal muscle tone. No abnormal involuntary movements. Strength is 5/5 throughout all four extremities.    Sensory  Sensation is intact to light touch, pinprick, vibration and proprioception in all four extremities.    Reflexes  Deep tendon reflexes are 2+ and symmetric in all four extremities.    Coordination    Finger-to-nose, rapid alternating movements and heel-to-shin normal bilaterally without dysmetria.    Gait  Normal casual, toe, heel and tandem gait.       IMAGING:  none    ASSESSMENT:  S/p lumbar fusion    Plan:  Doing well  F/U as needed      The plan of care was discussed with the patient at length. All questions and concerns were addressed at this time. The patient verbalized agreement with the plan and was appreciative.     Total visit time: 30 minutes; More than 50% spent coordinating care, counseling, reviewing imaging and discussing medication therapy.     Kole Hdez DO  Neurological Surgery  [facility]           [1]   Past Medical History:   Arthritis    Knees bone on bone    Back problem    Cancer (HCC)    SKIN CANCER    Easy bruising    ASA 81 mg daily    Glomerulonephritis    Hemorrhoids    Pregnancy    High blood pressure    History of COVID-19    fatigue. not hospitalized. no continued symptoms    Kidney failure    Transplant 4/25/02    Osteoarthritis    Other and unspecified hyperlipidemia    Pain in joints    Renal disorder    kidney transplant    Type II or unspecified type diabetes mellitus without mention of complication, not stated as uncontrolled    Unspecified essential hypertension    Visual impairment    glasses / contacts    Wears glasses   [2]   Past Surgical History:  Procedure Laterality Date    Colonoscopy  2013, 2017    Lumbar spine surgery  07/11/2024    anterior lumbar 4- lumbar 5, lumbar 5-sacral 1 discectomy and fusion with bone graft, cage, and screws    Organ transplant  2002    Kidney    Other surgical history   age 42    Renal transplant    Tonsillectomy  1964    Total knee replacement Right 01/06/2025    Transplantation of kidney      4/25/02 at [facility]   [3]   Allergies  Allergen Reactions    Adhesive Tape OTHER (SEE COMMENTS)     Caused redness after being on for a length of time    Ace Inhibitors Coughing   [4]   Current Outpatient Medications on File Prior to Visit   Medication Sig Dispense Refill    predniSONE 5 MG Oral Tab Take 1 tablet (5 mg total) by mouth daily. 100 tablet 2    Mycophenolate Mofetil 500 MG Oral Tab Take 2 tablets (1,000 mg total) by mouth 2 (two) times daily. 360 tablet 3    atorvastatin 20 MG Oral Tab Take 1 tablet (20 mg total) by mouth daily. 90 tablet 1    acetaminophen 500 MG Oral Tab Take 2 tablets (1,000 mg total) by mouth 4 (four) times daily. Post op      aspirin 81 MG Oral Tab EC Take 1 tablet (81 mg total) by mouth in the morning and 1 tablet (81 mg total) before bedtime. Post op.      LOSARTAN 50 MG Oral Tab TAKE 1 TABLET BY MOUTH DAILY 100 tablet 2    cycloSPORINE non-modified 25 MG Oral Cap Take 3 capsules (75 mg total) by mouth every morning.      cycloSPORINE non-modified 25 MG Oral Cap Take 2 capsules (50 mg total) by mouth every evening.      Glucose Blood In Vitro Strip 1 each by Other route daily. 90 each 3    magnesium oxide 400 MG Oral Tab Take 1 tablet (400 mg total) by mouth 2 (two) times daily.      multivitamin Oral Tab Take 1 tablet by mouth daily.      Cinnamon 500 MG Oral Cap Take 1,000 mg by mouth 2 (two) times daily.      Omega 3 1000 MG Oral Cap Take 2,000 mg by mouth 2 (two) times daily.      Cholecalciferol (VITAMIN D) 1000 UNITS Oral Cap Take 1 capsule by mouth 2 (two) times daily.       No current facility-administered medications on file prior to visit.

## 2025-07-07 NOTE — PROGRESS NOTES
Established patient:  Reason for follow up: 1 year post op     Numeric Rating Scale:       Pain at Present:  0/10       Distribution of Pain:    bilateral and Left      Most recent treatments for Current Pain Condition:   Physical Therapy and Surgery  Response to treatment: complete resolution

## 2025-07-07 NOTE — PATIENT INSTRUCTIONS
Refill policies:    Allow 2-3 business days for refills; controlled substances may take longer.  Contact your pharmacy at least 5 days prior to running out of medication and have them send an electronic request or submit request through the “request refill” option in your [employer] account.  Refills are not addressed on weekends; covering physicians do not authorize routine medications on weekends.  No narcotics or controlled substances are refilled after noon on Fridays or by on call physicians.  By law, narcotics must be electronically prescribed.  A 30 day supply with no refills is the maximum allowed.  If your prescription is due for a refill, you may be due for a follow up appointment.  To best provide you care, patients receiving routine medications need to be seen at least once a year.  Patients receiving narcotic/controlled substance medications need to be seen at least once every 3 months.  In the event that your preferred pharmacy does not have the requested medication in stock (e.g. Backordered), it is your responsibility to find another pharmacy that has the requested medication available.  We will gladly send a new prescription to that pharmacy at your request.    Scheduling Tests:    If your physician has ordered radiology tests such as MRI or CT scans, please contact Central Scheduling at [phone] right away to schedule the test.  Once scheduled, the [facility] Centralized Referral Team will work with your insurance carrier to obtain pre-certification or prior authorization.  Depending on your insurance carrier, approval may take 3-10 days.  It is highly recommended patients assure they have received an authorization before having a test performed.  If test is done without insurance authorization, patient may be responsible for the entire amount billed.      Precertification and Prior Authorizations:  If your physician has recommended that you have a procedure or additional testing performed the [facility]  Centralized Referral Team will contact your insurance carrier to obtain pre-certification or prior authorization.    You are strongly encouraged to contact your insurance carrier to verify that your procedure/test has been approved and is a COVERED benefit.  Although the [facility] Centralized Referral Team does its due diligence, the insurance carrier gives the disclaimer that \"Although the procedure is authorized, this does not guarantee payment.\"    Ultimately the patient is responsible for payment.   Thank you for your understanding in this matter.  Paperwork Completion:  If you require FMLA or disability paperwork for your recovery, please make sure to either drop it off or have it faxed to our office at [phone]. Be sure the form has your name and date of birth on it.  The form will be faxed to our Forms Department and they will complete it for you.  There is a 25$ fee for all forms that need to be filled out.  Please be aware there is a 10-14 day turnaround time.  You will need to sign a release of information (CHANDRA) form if your paperwork does not come with one.  You may call the Forms Department with any questions at [phone].  Their fax number is [phone].

## 2025-07-14 ENCOUNTER — TELEPHONE (OUTPATIENT)
Dept: FAMILY MEDICINE CLINIC | Facility: CLINIC | Age: 65
End: 2025-07-14

## 2025-07-14 NOTE — TELEPHONE ENCOUNTER
Letter mailed to patient reminding them they have outstanding orders.    Lab Frequency Next Occurrence   Hemoglobin A1C  08/23/2024 04/28/2025

## 2025-08-29 ENCOUNTER — TELEPHONE (OUTPATIENT)
Dept: FAMILY MEDICINE CLINIC | Facility: CLINIC | Age: 65
End: 2025-08-29

## (undated) DEVICE — SYRINGE MED 20ML STD CLR PLAS LL TIP N CTRL

## (undated) DEVICE — CLIP INT L ORNG TI TRNSVRS GRV CHEVRON SHP

## (undated) DEVICE — Device

## (undated) DEVICE — SMARTSET HIGH PERFORMANCE MV MEDIUM VISCOSITY BONE CEMENT 40G
Type: IMPLANTABLE DEVICE | Status: NON-FUNCTIONAL
Brand: SMARTSET

## (undated) DEVICE — WRAP THERAPEUTIC BACK WO GEL P

## (undated) DEVICE — ATTUNE PINNING SYSTEM: Brand: ATTUNE

## (undated) DEVICE — 450 ML BOTTLE OF 0.05% CHLORHEXIDINE GLUCONATE IN 99.95% STERILE WATER FOR IRRIGATION, USP AND APPLICATOR.: Brand: IRRISEPT ANTIMICROBIAL WOUND LAVAGE

## (undated) DEVICE — SOLUTION IRRIG 1000ML 0.9% NACL USP BTL

## (undated) DEVICE — SOLUTION IRRIG 3000ML 0.9% NACL FLX CONT

## (undated) DEVICE — DRAPE,TOWEL,LARGE,INVISISHIELD: Brand: MEDLINE

## (undated) DEVICE — SLEEVE COMPR MD KNEE LEN SGL USE KENDALL SCD

## (undated) DEVICE — GLOVE SUR 7 SENSICARE PI PIP GRN PWD F

## (undated) DEVICE — LAMINECTOMY CDS: Brand: MEDLINE INDUSTRIES, INC.

## (undated) DEVICE — COVER,TABLE,44X90,STERILE: Brand: MEDLINE

## (undated) DEVICE — SUTURE STRATAFIX SYMMTRC PDS + SZ 1 L18IN

## (undated) DEVICE — BANDAGE ADH 1INX3IN NAT FAB N ADH PD CURAD

## (undated) DEVICE — 3 BONE CEMENT MIXER: Brand: MIXEVAC

## (undated) DEVICE — 3.0MM PRECISION NEURO (MATCH HEAD)

## (undated) DEVICE — STRYKER PERFORMANCE SERIES SAGITTAL BLADE: Brand: STRYKER PERFORMANCE SERIES

## (undated) DEVICE — 3M™ IOBAN™ 2 ANTIMICROBIAL INCISE DRAPE 6648EZ: Brand: IOBAN™ 2

## (undated) DEVICE — SUT PDS II 4-0 27IN SH ABSRB VLT L26MM 1/2

## (undated) DEVICE — ELECTRODE ES 2.5IN PTFE CAUT TIP

## (undated) DEVICE — SUT MCRYL 4-0 27IN ABSRB UD 19MM PS-2 3/8

## (undated) DEVICE — GOWN,PREVENTION PLUS,XLARGE,STERILE: Brand: MEDLINE

## (undated) DEVICE — UNIVERSAL STERIBUMP® STERILE (5/CASE): Brand: UNIVERSAL STERIBUMP®

## (undated) DEVICE — PROXIMATE RH ROTATING HEAD SKIN STAPLERS (35 WIDE) CONTAINS 35 STAINLESS STEEL STAPLES: Brand: PROXIMATE

## (undated) DEVICE — SKIN REG/FINE DUAL MARKER, RULER, LABELS: Brand: MEDLINE

## (undated) DEVICE — PAD,NON-ADHERENT,3X8,STERILE,LF,1/PK: Brand: MEDLINE

## (undated) DEVICE — PAD KNEE POS PROTCT MEM GEL FOAM BOOT AND

## (undated) DEVICE — CABLE ELECTR DISP LT

## (undated) DEVICE — PAIN TRAY: Brand: MEDLINE INDUSTRIES, INC.

## (undated) DEVICE — SUT VCRL 0 18IN CT-1 ABSRB VLT CR L36MM 1/2

## (undated) DEVICE — STOCKINETTE,IMPERVIOUS,12X48,STERILE: Brand: MEDLINE

## (undated) DEVICE — BLADE ELECTRODE: Brand: EDGE

## (undated) DEVICE — ZZ- DISC- NOSUB-SOLUTION RUBBING 4OZ 70% ISO ALC CLR

## (undated) DEVICE — CLIP SUR SM TI HRT SHP WIRE HORZ LIG SYS

## (undated) DEVICE — ANTIBACTERIAL UNDYED BRAIDED (POLYGLACTIN 910), SYNTHETIC ABSORBABLE SUTURE: Brand: COATED VICRYL

## (undated) DEVICE — REMOVER LOT 4OZ N IRRIG UNSCNT SFT MOIST LIQ

## (undated) DEVICE — SUT STRATAFIX MCRYL + 3-0 30X30CM ABSRB UD PS-2

## (undated) DEVICE — SUT STRATAFIX SYMMTRC PDS+ 1 18IN CTX ABSRB V

## (undated) DEVICE — 3M™ TEGADERM™ TRANSPARENT FILM DRESSING FRAME STYLE, 1626W, 4 IN X 4-3/4 IN (10 CM X 12 CM), 50/CT 4CT/CASE: Brand: 3M™ TEGADERM™

## (undated) DEVICE — Device: Brand: INTELLICART™

## (undated) DEVICE — DISPOSABLE TOURNIQUET CUFF SINGLE BLADDER, DUAL PORT AND QUICK CONNECT CONNECTOR: Brand: COLOR CUFF

## (undated) DEVICE — GLOVE SUR 7.5 SENSICARE PIP WHT PWD F

## (undated) DEVICE — UNDYED BRAIDED (POLYGLACTIN 910), SYNTHETIC ABSORBABLE SUTURE: Brand: COATED VICRYL

## (undated) DEVICE — MEDIUM BLADE: Brand: MILL PLUS

## (undated) DEVICE — SUT PROL 4-0 36IN SH NABSRB BLU 26MM 1/2 CIR

## (undated) DEVICE — COVER,LIGHT,CAMERA,HARD,1/PK,STRL: Brand: MEDLINE

## (undated) DEVICE — ADHESIVE SKIN TOP FOR WND CLSR DERMBND ADV

## (undated) DEVICE — GLOVE SUR 6.5 SENSICARE PIP WHT PWD F

## (undated) DEVICE — CLIP APPLIER: Brand: PREMIUM SURGICLIP II

## (undated) DEVICE — 3M™ IOBAN™ 2 ANTIMICROBIAL INCISE DRAPE 6651EZ: Brand: IOBAN™ 2

## (undated) DEVICE — BANDAGE COHESIVE W4INXL5YD TAN E POR SLF ADH

## (undated) DEVICE — TOTAL KNEE CDS: Brand: MEDLINE INDUSTRIES, INC.

## (undated) DEVICE — COVER LT HNDL RIG FOR SUR CAM DISP

## (undated) DEVICE — E-Z CLEAN, NON-STICK, PTFE COATED, ELECTROSURGICAL BLADE ELECTRODE, MODIFIED EXTENDED INSULATION, 6.5 INCH (16.5 CM): Brand: MEGADYNE

## (undated) DEVICE — STERILE POLYISOPRENE POWDER-FREE SURGICAL GLOVES: Brand: PROTEXIS

## (undated) DEVICE — ELECTRODE ES L10.2CM BLDE L4IN EXT MPLR OPN

## (undated) DEVICE — NEEDLE SPNL Y STYL BVL DMND TIP ES2 LT

## (undated) DEVICE — APPLICATOR SKIN PREP 26ML HI LT ORNG 2% CHG

## (undated) DEVICE — GOWN SURG AERO CHROME XXL

## (undated) DEVICE — ELECTRODE ES 2.75IN PTFE BLDE MOD E-Z CLN

## (undated) DEVICE — NEEDLE SPNL 20GA L3.5IN YEL QNCKE STYL DISP

## (undated) DEVICE — SUT PERMA- 2-0 30IN NABSRB BLK TIE SILK

## (undated) DEVICE — STOCKINETTE SUR W12XL48IN STD HLLW IMPERV OTR

## (undated) DEVICE — GLOVE SUR 8 SENSICARE PI PIP CRM PWD F

## (undated) DEVICE — GLOVE SUR 7.5 SENSICARE PI PIP CRM PWD F

## (undated) DEVICE — CLIP LIG M BLU TI HRT SHP WRE HORZ 600 PER BX

## (undated) DEVICE — SUT VCRL 1 27IN CPX ABSRB UD L48MM 1/2 CIR

## (undated) DEVICE — Device: Brand: STABLECUT®

## (undated) DEVICE — LIGHT HANDLE

## (undated) DEVICE — SPONGE GZ 4X4IN COT 12 PLY TYP VII WVN C

## (undated) DEVICE — MONITORING NEUROPHYSIOLOGICAL

## (undated) DEVICE — SUTURE VCRL SZ 1 L27IN ABSRB UD L48MM CPX 1/2

## (undated) DEVICE — CONTAINER,SPECIMEN,PNEU TUBE,4OZ,OR STRL: Brand: MEDLINE

## (undated) DEVICE — SUT PDS II 0 L60IN ABSRB VLT L48MM CTX 1/2

## (undated) DEVICE — MARKER SKIN PREP RESIST STRL

## (undated) DEVICE — HOOD: Brand: FLYTE

## (undated) DEVICE — NEPTUNE E-SEP SMOKE EVACUATION PENCIL, COATED, 70MM BLADE, PUSH BUTTON SWITCH: Brand: NEPTUNE E-SEP

## (undated) DEVICE — NEEDLE SPNL 22GA L5IN BLK HUB QNCKE BVL DISP

## (undated) DEVICE — KIT EVAC 400CC DIA1/8IN H PAT 12.5IN 3 SPR

## (undated) DEVICE — DISPOSABLE DUAL IRRIGATING BIPOLAR FORCEPS, NON-STICK,: Brand: SPETZLER-MALIS

## (undated) DEVICE — STERILE HOOK LOCK LATEX FREE ELASTIC BANDAGE 6INX5YD: Brand: HOOK LOCK™

## (undated) DEVICE — CONTAINER SPEC 4OZ POLYPR GRAD LEAK RESIST

## (undated) DEVICE — GOWN SUR 2XL LEV 4 BLU W/ WHT V NK BND AERO

## (undated) DEVICE — ELECTRODE ES L16.5CM BLDE MPLR OPN APPRCH EZ

## (undated) DEVICE — C-ARMOR C-ARM EQUIPMENT COVERS CLEAR STERILE UNIVERSAL FIT 12 PER CASE: Brand: C-ARMOR

## (undated) DEVICE — GLOVE SUR 7 SENSICARE PI PIP CRM PWD F

## (undated) DEVICE — GLOVE SUR 8 SENSICARE PIP WHT PWD F

## (undated) DEVICE — SUTURE VCRL SZ 2-0 L27IN ABSRB UD L36MM CP-1

## (undated) DEVICE — BOWL BNE CEM MIX DISP QUIK-VAC

## (undated) DEVICE — PENCIL ES BTTN SWCH W/ TIP HOLSTER E-Z CLN

## (undated) DEVICE — SLEEVE COMPR M KNEE LEN SGL USE KENDALL SCD

## (undated) DEVICE — GLOVE SUR 8 SENSICARE PI PIP GRN PWD F

## (undated) DEVICE — DRAPE,U/SHT,SPLIT,FILM,60X84,STERILE: Brand: MEDLINE

## (undated) DEVICE — SUT PDS II DA 5-0 C-1 30IN VLT ABSRB MFIL

## (undated) DEVICE — BANDAGE,COHESIVE,TAN,4X5YD,LF,STRL: Brand: MEDLINE

## (undated) DEVICE — CLIP INT USE SM TI LIG HORZ

## (undated) DEVICE — ATTAHJA VAC WITH 22MM CONNECTR

## (undated) DEVICE — 3M™ MEDIPORE™ H SOFT CLOTH SURGICAL TAPE 2864, 4 INCH X 10 YARD (10CM X 9,14M), 12 ROLLS/CASE: Brand: 3M™ MEDIPORE™

## (undated) DEVICE — BNDG,ELSTC,MATRIX,STRL,6"X5YD,LF,HOOK&LP: Brand: MEDLINE

## (undated) DEVICE — DISPOSABLE SLIM BIPOLAR FORCEPS, NON-STICK,: Brand: SPETZLER-MALIS

## (undated) DEVICE — HOOD, PEEL-AWAY: Brand: FLYTE

## (undated) DEVICE — STANDARD HYPODERMIC NEEDLE,POLYPROPYLENE HUB: Brand: MONOJECT

## (undated) DEVICE — DRAPE,INSTRUMENT,MAGNETIC,10X16: Brand: MEDLINE

## (undated) DEVICE — APPLICATOR PREP 26ML CHG 2% ISO ALC 70%

## (undated) DEVICE — E-Z CLEAN, NON-STICK, PTFE COATED, ELECTROSURGICAL BLADE ELECTRODE, MODIFIED EXTENDED INSULATION, 4 INCH (10.2 CM): Brand: MEGADYNE

## (undated) DEVICE — ABSORBABLE HEMOSTAT (OXIDIZED REGENERATED CELLULOSE, U.S.P.): Brand: SURGICEL FIBRILLAR

## (undated) DEVICE — SUT COAT VCRL 2-0 27IN ABSRB UD 26MM CT-2

## (undated) DEVICE — PTFE COATED BLADE 4': Brand: MEDLINE

## (undated) DEVICE — SUT MCRYL 4-0 18IN PS-2 ABSRB UD 19MM 3/8 CIR

## (undated) DEVICE — WRAP COMPR UNIV KNEE HOT CLD GEL MICWV AND

## (undated) DEVICE — STERILE POLYISOPRENE POWDER-FREE SURGICAL GLOVES WITH EMOLLIENT COATING: Brand: PROTEXIS

## (undated) DEVICE — KIT HEMSTAT MTRX 8ML PORCINE GEL HUM THROM

## (undated) DEVICE — SUT VCRL 2-0 18IN ABSRB UD CR L26MM CT-2

## (undated) DEVICE — C-ARM: Brand: UNBRANDED

## (undated) DEVICE — CLIP LIG M BLU TI HRT SHP WIRE HORZ

## (undated) NOTE — LETTER
Patient Name: Casie Peraza  YOB: 1960          MRN number:  [id number]  Date:  11/19/2019  Referring Physician:  Lamar Leventhal    Progress Summary  Pt has attended 14 visits in Physical Therapy.    Dx: Acute bilateral low back pain without sci 4. Pt will be independent with progressive HEP. (PROGRESSING)  5.  Pt will be able to get up and down off floor without >2/10 pain to play with grandchild (NEW GOAL)    FOTO: 71 (59 @ eval)    Plan: Continue skilled Physical Therapy 1 x/week or for 4 visits

## (undated) NOTE — LETTER
Patient Name: Perry Sawyer  YOB: 1960          MRN number:  [id number]  Date:  1/13/2020  Referring Physician:  Andria Gonzalez      INITIAL EVALUATION:    Referring Physician: Dr. Chastity Aguiar  Diagnosis: Acute bilateral low back pain without sciatic Myotomal: WNL B    AROM:   Lumbar ROM: (* denotes performed with pain)  Flexion: to feet  Extension: unable without pain **  Sidebending: R 37 cm*; L 35 cm  Rotation: R 50%*; L 50%*    Repeated Motions:  -Standing Flexion + reduction  -Standing Extension + joint at this time. Pt has chronic LBP and likely had a flare up about 1.5 weeks ago. Pt had positive response to flexion exercises in today's session. Functional deficits include but are not limited to bed mobility, sit to stand, rotation in car, and [employer] X___________________________________________________ Date____________________    Certification From: 8/86/9329  To:4/12/2020

## (undated) NOTE — MR AVS SNAPSHOT
[address], [address] [phone]               Thank you for choosing us for your health care visit with Sofia Jones MD.  We are glad to serve you and happy to provide you with [facility] 1 each by Other route daily. Commonly known as:  [employer] CONTOUR TEST           LIPITOR 20 MG Tabs   Generic drug:  Atorvastatin Calcium   Take 20 mg by mouth daily. Losartan Potassium 50 MG Tabs   Take 50 mg by mouth daily.    Commonly known as: Renal transplant recipient [[id number]]  Benign hypertension [[id number]]           CKD (chronic kidney disease), stage 3 (moderate) [9893971]  Renal transplant recipient [[id number]]  Benign hypertension [[id number]]           CKD (chronic kidney disease), stage 3 (moder

## (undated) NOTE — LETTER
[facility] Department  Phone: [phone]  Right Fax: [phone]  ABNORMAL VALUES FAX    Sent By:  Edwige AGUILAR RN Date: 24    Patient Name: Brinda Max  Surgery Date: 2024    CSN: [id number]  Medical Record: [id number]   : 1960 - A: 64 y      Sex: female    Surgeon(s):  LEY Hdez DO Luu, Minh, MD    Procedure Comments: anterior lumbar 4- lumbar 5, lumbar 5-sacral 1 discectomy and fusion with bone graft, cage, and screws  Anesthesia Type: General    [facility] Surgeon Info:  ELY Hdez DO  Phone Number: [phone]    To Surgeon: ELY Hdez DO Fax #:  [phone]    YOU SHOULD RECEIVE 2 PAGES (INCLUDING COVER SHEET)    PLEASE NOTE THE FOLLOWING ABNORMALITIES:       Abnormal Urine culture  _______________________________________________________________        [facility]  [address] [place]  [address]  Phone: [phone]  Fax: [phone]  [email or web address]    STATEMENT OF CONFIDENTIALITY: This transmittal is intended only for the use of the individual entity to which is addressed and may contain information that is privileged and confidential. information contained. If the reader of this message IS NOT the intended recipient, you are hereby notified that any disclosure, distribution or copying of this information is strictly prohibited. If you have received this transmission in error, please notify us immediately by telephone and return the original documents to us at the above address via the [employer] [place] Postal [employer]. Thank you.

## (undated) NOTE — LETTER
Patient Name: Rohith Fisher  YOB: 1960          MRN number:  [id number]  Date:  12/3/2019  Referring Physician:  Suki Vásquez    Discharge Summary  Initial Functional Outcome Score 59/100  Final Functional Outcome Score 83/100  Number of Visi minutes without reproduction of pain. (MET - has been laying prone)  4. Pt will be independent with progressive HEP. (MET)  5.  Pt will be able to get up and down off floor without >2/10 pain to play with grandchild (MET)    FOTO: 83 (59 @ eval)    Plan: Pt

## (undated) NOTE — LETTER
Patient Name: Perry Sawyer  YOB: 1960          MRN number:  [id number]  Date:  10/29/2019  Referring Physician:  Andria Gonzalez    Progress Summary  Pt has attended 8 visits in Physical Therapy.    Dx: Acute bilateral low back pain without scia minutes without reproduction of pain. (PROGRESSING - not attempted but increased flexibility)  4. Pt will be independent with progressive HEP.  (PROGRESSING)    FOTO: 56 (59 at eval)    Plan: Continue skilled Physical Therapy 2 x/week for an additional 6 vi